# Patient Record
Sex: FEMALE | Race: WHITE | Employment: OTHER | URBAN - METROPOLITAN AREA
[De-identification: names, ages, dates, MRNs, and addresses within clinical notes are randomized per-mention and may not be internally consistent; named-entity substitution may affect disease eponyms.]

---

## 2018-02-20 PROBLEM — E78.5 HYPERLIPIDEMIA LDL GOAL <100: Chronic | Status: ACTIVE | Noted: 2018-02-20

## 2018-02-20 PROBLEM — N30.00 ACUTE CYSTITIS: Status: ACTIVE | Noted: 2018-02-20

## 2018-02-20 PROBLEM — R07.9 CHEST PAIN: Status: ACTIVE | Noted: 2018-02-20

## 2018-03-23 DIAGNOSIS — E78.5 HYPERLIPIDEMIA LDL GOAL <100: Chronic | ICD-10-CM

## 2018-04-02 ENCOUNTER — TELEPHONE (OUTPATIENT)
Dept: PRIMARY CARE CLINIC | Age: 68
End: 2018-04-02

## 2018-05-04 DIAGNOSIS — E78.5 HYPERLIPIDEMIA LDL GOAL <100: Chronic | ICD-10-CM

## 2018-06-01 ENCOUNTER — OFFICE VISIT (OUTPATIENT)
Dept: PRIMARY CARE CLINIC | Age: 68
End: 2018-06-01
Payer: MEDICARE

## 2018-06-01 ENCOUNTER — HOSPITAL ENCOUNTER (OUTPATIENT)
Age: 68
Discharge: HOME | End: 2018-06-03
Payer: MEDICARE

## 2018-06-01 VITALS
HEART RATE: 57 BPM | OXYGEN SATURATION: 98 % | WEIGHT: 178 LBS | BODY MASS INDEX: 29.62 KG/M2 | TEMPERATURE: 98.2 F | SYSTOLIC BLOOD PRESSURE: 110 MMHG | DIASTOLIC BLOOD PRESSURE: 80 MMHG

## 2018-06-01 DIAGNOSIS — E78.5 HYPERLIPIDEMIA LDL GOAL <100: Chronic | ICD-10-CM

## 2018-06-01 DIAGNOSIS — Z12.31 SCREENING MAMMOGRAM, ENCOUNTER FOR: ICD-10-CM

## 2018-06-01 DIAGNOSIS — E78.5 HYPERLIPIDEMIA LDL GOAL <100: Primary | Chronic | ICD-10-CM

## 2018-06-01 LAB
CHOLESTEROL, TOTAL: 185 MG/DL (ref 0–199)
HDLC SERPL-MCNC: 52 MG/DL
LDL CHOLESTEROL CALCULATED: 85 MG/DL (ref 0–99)
TRIGL SERPL-MCNC: 241 MG/DL (ref 0–149)
VLDLC SERPL CALC-MCNC: 48 MG/DL

## 2018-06-01 PROCEDURE — 80061 LIPID PANEL: CPT

## 2018-06-01 PROCEDURE — 99213 OFFICE O/P EST LOW 20 MIN: CPT | Performed by: INTERNAL MEDICINE

## 2018-06-12 ENCOUNTER — HOSPITAL ENCOUNTER (OUTPATIENT)
Dept: GENERAL RADIOLOGY | Age: 68
Discharge: HOME | End: 2018-06-14
Payer: MEDICARE

## 2018-06-12 DIAGNOSIS — Z12.31 SCREENING MAMMOGRAM, ENCOUNTER FOR: ICD-10-CM

## 2018-06-12 PROCEDURE — 77063 BREAST TOMOSYNTHESIS BI: CPT

## 2018-07-19 ENCOUNTER — OFFICE VISIT (OUTPATIENT)
Dept: PRIMARY CARE CLINIC | Age: 68
End: 2018-07-19
Payer: MEDICARE

## 2018-07-19 VITALS
HEIGHT: 65 IN | HEART RATE: 66 BPM | SYSTOLIC BLOOD PRESSURE: 110 MMHG | OXYGEN SATURATION: 96 % | TEMPERATURE: 97.7 F | DIASTOLIC BLOOD PRESSURE: 80 MMHG | WEIGHT: 178 LBS | BODY MASS INDEX: 29.66 KG/M2

## 2018-07-19 DIAGNOSIS — G89.29 CHRONIC LEFT SHOULDER PAIN: Primary | ICD-10-CM

## 2018-07-19 DIAGNOSIS — M25.512 CHRONIC LEFT SHOULDER PAIN: Primary | ICD-10-CM

## 2018-07-19 DIAGNOSIS — M19.90 ARTHRITIS: ICD-10-CM

## 2018-07-19 PROCEDURE — 20605 DRAIN/INJ JOINT/BURSA W/O US: CPT | Performed by: PHYSICIAN ASSISTANT

## 2018-07-19 PROCEDURE — 99213 OFFICE O/P EST LOW 20 MIN: CPT | Performed by: PHYSICIAN ASSISTANT

## 2018-07-19 RX ORDER — METHYLPREDNISOLONE ACETATE 80 MG/ML
80 INJECTION, SUSPENSION INTRA-ARTICULAR; INTRALESIONAL; INTRAMUSCULAR; SOFT TISSUE ONCE
Status: COMPLETED | OUTPATIENT
Start: 2018-07-19 | End: 2018-07-19

## 2018-07-19 RX ORDER — METHYLPREDNISOLONE 4 MG/1
TABLET ORAL
Qty: 1 KIT | Refills: 0 | Status: SHIPPED | OUTPATIENT
Start: 2018-07-19 | End: 2018-07-25

## 2018-07-19 RX ADMIN — METHYLPREDNISOLONE ACETATE 80 MG: 80 INJECTION, SUSPENSION INTRA-ARTICULAR; INTRALESIONAL; INTRAMUSCULAR; SOFT TISSUE at 11:41

## 2018-07-19 NOTE — PROGRESS NOTES
Chief Complaint:  Shoulder Pain (L shoulder pain is exacerbated, injections in the past have been helpful)      History of Present Illness:  Source of history provided by:  patient. Alaina Barber is a 75 y.o. old female presenting today with complaints of exacerbation of her L shoudler, had Hx of bursitis, impingement in the past, had injection with Dr. Teja Celeste in 11/2016 and per pt it helped her tremendously. States hse has bene active with yard work, garedning, and sewing and thinks this flared it up. Also is under care of podiatry for foot, and has arthritis in her wrists and back which has been flaring. Takes ibuprofen which doesn't help much. Asks what she can try. Denies any neck pain or injury, no arm weakness,  HA, N/T, hand weakness, or associated CP. Review of Systems:  Unless otherwise stated in this report or unable to obtain because of the patient's clinical or mental status as evidenced by the medical record, this patients's positive and negative responses for Review of Systems, constitutional, psych, eyes, ENT, cardiovascular, respiratory, gastrointestinal, neurological, genitourinary, musculoskeletal, integument systems and systems related to the presenting problem are either stated in the preceding or were not pertinent or were negative for the symptoms and/or complaints related to the medical problem. Past Medical History:  has a past medical history of Arthritis; Chronic kidney disease; Hydronephrosis of left kidney; Hyperlipidemia; and Sepsis ([facility] [address] Past Surgical History:  has a past surgical history that includes Tubal ligation; Colonoscopy; Ureteroscopy (Left, 09/14/2015); eye surgery (Bilateral); and Hysterectomy. Social History:  reports that she has never smoked. She has never used smokeless tobacco. She reports that she does not drink alcohol or use drugs. Family History: family history is not on file. Allergies: Daypro [oxaprozin]; Demerol hcl [meperidine];  Levaquin

## 2018-07-25 ENCOUNTER — TELEPHONE (OUTPATIENT)
Dept: PRIMARY CARE CLINIC | Age: 68
End: 2018-07-25

## 2018-09-28 DIAGNOSIS — F41.9 ANXIETY: ICD-10-CM

## 2018-09-28 RX ORDER — CITALOPRAM 10 MG/1
10 TABLET ORAL DAILY
Qty: 90 TABLET | Refills: 1 | Status: SHIPPED | OUTPATIENT
Start: 2018-09-28 | End: 2018-10-15 | Stop reason: SDUPTHER

## 2018-10-15 ENCOUNTER — OFFICE VISIT (OUTPATIENT)
Dept: PRIMARY CARE CLINIC | Age: 68
End: 2018-10-15
Payer: MEDICARE

## 2018-10-15 VITALS
OXYGEN SATURATION: 97 % | SYSTOLIC BLOOD PRESSURE: 134 MMHG | DIASTOLIC BLOOD PRESSURE: 84 MMHG | HEART RATE: 72 BPM | TEMPERATURE: 97 F | BODY MASS INDEX: 30.29 KG/M2 | WEIGHT: 182 LBS

## 2018-10-15 DIAGNOSIS — M79.2 NEUROPATHIC PAIN: ICD-10-CM

## 2018-10-15 DIAGNOSIS — R23.2 HOT FLASHES: ICD-10-CM

## 2018-10-15 DIAGNOSIS — M54.2 NECK PAIN: Primary | ICD-10-CM

## 2018-10-15 DIAGNOSIS — F41.9 ANXIETY: ICD-10-CM

## 2018-10-15 PROCEDURE — 99214 OFFICE O/P EST MOD 30 MIN: CPT | Performed by: INTERNAL MEDICINE

## 2018-10-15 RX ORDER — CITALOPRAM 20 MG/1
20 TABLET ORAL DAILY
Qty: 30 TABLET | Refills: 3 | Status: SHIPPED | OUTPATIENT
Start: 2018-10-15 | End: 2019-02-05 | Stop reason: SDUPTHER

## 2018-10-15 ASSESSMENT — PATIENT HEALTH QUESTIONNAIRE - PHQ9
SUM OF ALL RESPONSES TO PHQ QUESTIONS 1-9: 0
1. LITTLE INTEREST OR PLEASURE IN DOING THINGS: 0
SUM OF ALL RESPONSES TO PHQ QUESTIONS 1-9: 0

## 2018-10-15 NOTE — PROGRESS NOTES
and leg swelling. Gastrointestinal: Negative for abdominal distention, constipation, diarrhea, nausea and vomiting. Musculoskeletal: Negative for arthralgias, back pain, gait problem and joint swelling. Neurological: Negative for dizziness, weakness,numbness and headaches. OBJECTIVE:  /84   Pulse 72   Temp 97 °F (36.1 °C)   Wt 182 lb (82.6 kg)   SpO2 97%   BMI 30.29 kg/m²      Physical Exam   Constitutional:  oriented to person, place, and time. appears well-developed and well-nourished. No distress. HENT: No sinustenderness or lymphadenopathy  Head: Normocephalic and atraumatic. Eyes: Left eye exhibits no discharge. No scleral icterus. Neck: No tracheal deviation present. No thyromegalypresent. Cardiovascular: Normal rate, regular rhythm, normal heart sounds and intact distal pulses. Exam reveals no gallop and no friction rub. No murmur heard. Pulmonary/Chest: Effort normal and breath sounds normal. No respiratory distress. She has no wheezes. no rales. no tenderness. Musculoskeletal:  no tenderness. Neurological:alert and oriented to person, place, and time. Skin: No diaphoresis. Psychiatric: Normal mood and affect. Behavior isnormal.     ASSESSMENT AND PLAN:    Santos Sanon was seen today for shoulder pain and arm pain. Diagnoses and all orders for this visit:    Neck pain  -     XR CERVICAL SPINE (2-3 VIEWS); Future  -     citalopram (CELEXA) 20 MG tablet; Take 1 tablet by mouth daily    Anxiety  -     citalopram (CELEXA) 20 MG tablet; Take 1 tablet by mouth daily    Neuropathic pain    Hot flashes        Megan Brian presents today for evaluation of her chronic aches and pains. She is complaining of some upper thoracic, cervical and bilateral arm pain. She states that this is been present for many years but is worse in recently. She questions whether or not she has a diagnosis of fribromyalgia.  I will 1st increase her Celexa to 20 mg. I will also obtain an x-ray of the

## 2018-10-17 ENCOUNTER — HOSPITAL ENCOUNTER (OUTPATIENT)
Dept: GENERAL RADIOLOGY | Age: 68
Discharge: HOME | End: 2018-10-19
Payer: MEDICARE

## 2018-10-17 ENCOUNTER — HOSPITAL ENCOUNTER (OUTPATIENT)
Age: 68
Discharge: HOME | End: 2018-10-19
Payer: MEDICARE

## 2018-10-17 DIAGNOSIS — M54.2 NECK PAIN: ICD-10-CM

## 2018-10-17 PROCEDURE — 72040 X-RAY EXAM NECK SPINE 2-3 VW: CPT

## 2018-12-04 ENCOUNTER — OFFICE VISIT (OUTPATIENT)
Dept: PRIMARY CARE CLINIC | Age: 68
End: 2018-12-04
Payer: MEDICARE

## 2018-12-04 VITALS
BODY MASS INDEX: 29.95 KG/M2 | DIASTOLIC BLOOD PRESSURE: 62 MMHG | OXYGEN SATURATION: 97 % | WEIGHT: 180 LBS | TEMPERATURE: 96.4 F | HEART RATE: 71 BPM | SYSTOLIC BLOOD PRESSURE: 122 MMHG

## 2018-12-04 DIAGNOSIS — G89.29 CHRONIC LEFT SHOULDER PAIN: Primary | ICD-10-CM

## 2018-12-04 DIAGNOSIS — M25.512 CHRONIC LEFT SHOULDER PAIN: Primary | ICD-10-CM

## 2018-12-04 DIAGNOSIS — J01.10 ACUTE FRONTAL SINUSITIS, RECURRENCE NOT SPECIFIED: ICD-10-CM

## 2018-12-04 DIAGNOSIS — M79.645 PAIN OF LEFT THUMB: ICD-10-CM

## 2018-12-04 DIAGNOSIS — M75.42 IMPINGEMENT SYNDROME OF LEFT SHOULDER: ICD-10-CM

## 2018-12-04 PROCEDURE — 99213 OFFICE O/P EST LOW 20 MIN: CPT | Performed by: INTERNAL MEDICINE

## 2018-12-04 RX ORDER — AZITHROMYCIN 250 MG/1
TABLET, FILM COATED ORAL
Qty: 6 TABLET | Refills: 0 | Status: SHIPPED | OUTPATIENT
Start: 2018-12-04 | End: 2018-12-14

## 2018-12-05 ENCOUNTER — HOSPITAL ENCOUNTER (OUTPATIENT)
Age: 68
Discharge: HOME | End: 2018-12-07
Payer: MEDICARE

## 2018-12-05 ENCOUNTER — HOSPITAL ENCOUNTER (OUTPATIENT)
Dept: GENERAL RADIOLOGY | Age: 68
Discharge: HOME | End: 2018-12-07
Payer: MEDICARE

## 2018-12-05 DIAGNOSIS — G89.29 CHRONIC LEFT SHOULDER PAIN: ICD-10-CM

## 2018-12-05 DIAGNOSIS — M79.645 PAIN OF LEFT THUMB: ICD-10-CM

## 2018-12-05 DIAGNOSIS — M25.512 CHRONIC LEFT SHOULDER PAIN: ICD-10-CM

## 2018-12-05 PROCEDURE — 73120 X-RAY EXAM OF HAND: CPT

## 2018-12-05 PROCEDURE — 73030 X-RAY EXAM OF SHOULDER: CPT

## 2019-01-09 ENCOUNTER — OFFICE VISIT (OUTPATIENT)
Dept: ORTHOPEDIC SURGERY | Age: 69
End: 2019-01-09
Payer: MEDICARE

## 2019-01-09 VITALS
BODY MASS INDEX: 29.66 KG/M2 | DIASTOLIC BLOOD PRESSURE: 81 MMHG | HEIGHT: 65 IN | TEMPERATURE: 98.2 F | SYSTOLIC BLOOD PRESSURE: 144 MMHG | WEIGHT: 178 LBS | HEART RATE: 65 BPM

## 2019-01-09 DIAGNOSIS — M25.512 CHRONIC LEFT SHOULDER PAIN: Primary | ICD-10-CM

## 2019-01-09 DIAGNOSIS — G89.29 CHRONIC LEFT SHOULDER PAIN: Primary | ICD-10-CM

## 2019-01-09 PROCEDURE — 99203 OFFICE O/P NEW LOW 30 MIN: CPT | Performed by: ORTHOPAEDIC SURGERY

## 2019-01-09 RX ORDER — IPRATROPIUM BROMIDE 42 UG/1
2 SPRAY, METERED NASAL EVERY 12 HOURS
COMMUNITY
End: 2019-08-28 | Stop reason: ALTCHOICE

## 2019-01-17 ENCOUNTER — HOSPITAL ENCOUNTER (OUTPATIENT)
Dept: MRI IMAGING | Age: 69
Discharge: HOME | End: 2019-01-19
Payer: MEDICARE

## 2019-01-17 DIAGNOSIS — M25.512 CHRONIC LEFT SHOULDER PAIN: ICD-10-CM

## 2019-01-17 DIAGNOSIS — G89.29 CHRONIC LEFT SHOULDER PAIN: ICD-10-CM

## 2019-01-17 PROCEDURE — 73221 MRI JOINT UPR EXTREM W/O DYE: CPT

## 2019-01-25 ENCOUNTER — OFFICE VISIT (OUTPATIENT)
Dept: ORTHOPEDIC SURGERY | Age: 69
End: 2019-01-25
Payer: MEDICARE

## 2019-01-25 VITALS
SYSTOLIC BLOOD PRESSURE: 122 MMHG | HEART RATE: 72 BPM | DIASTOLIC BLOOD PRESSURE: 73 MMHG | BODY MASS INDEX: 29.66 KG/M2 | HEIGHT: 65 IN | TEMPERATURE: 97.4 F | WEIGHT: 178 LBS

## 2019-01-25 DIAGNOSIS — M25.512 CHRONIC LEFT SHOULDER PAIN: Primary | ICD-10-CM

## 2019-01-25 DIAGNOSIS — G89.29 CHRONIC LEFT SHOULDER PAIN: Primary | ICD-10-CM

## 2019-01-25 PROCEDURE — 20610 DRAIN/INJ JOINT/BURSA W/O US: CPT | Performed by: ORTHOPAEDIC SURGERY

## 2019-01-25 PROCEDURE — 99213 OFFICE O/P EST LOW 20 MIN: CPT | Performed by: ORTHOPAEDIC SURGERY

## 2019-01-25 RX ORDER — BUPIVACAINE HYDROCHLORIDE 5 MG/ML
2 INJECTION, SOLUTION PERINEURAL ONCE
Status: COMPLETED | OUTPATIENT
Start: 2019-01-25 | End: 2019-01-25

## 2019-01-25 RX ORDER — TRIAMCINOLONE ACETONIDE 40 MG/ML
40 INJECTION, SUSPENSION INTRA-ARTICULAR; INTRAMUSCULAR ONCE
Status: COMPLETED | OUTPATIENT
Start: 2019-01-25 | End: 2019-01-25

## 2019-01-25 RX ORDER — LIDOCAINE HYDROCHLORIDE 10 MG/ML
2 INJECTION, SOLUTION INFILTRATION; PERINEURAL ONCE
Status: COMPLETED | OUTPATIENT
Start: 2019-01-25 | End: 2019-01-25

## 2019-01-25 RX ADMIN — LIDOCAINE HYDROCHLORIDE 2 ML: 10 INJECTION, SOLUTION INFILTRATION; PERINEURAL at 11:14

## 2019-01-25 RX ADMIN — TRIAMCINOLONE ACETONIDE 40 MG: 40 INJECTION, SUSPENSION INTRA-ARTICULAR; INTRAMUSCULAR at 11:14

## 2019-01-25 RX ADMIN — BUPIVACAINE HYDROCHLORIDE 10 MG: 5 INJECTION, SOLUTION PERINEURAL at 11:13

## 2019-02-05 ENCOUNTER — OFFICE VISIT (OUTPATIENT)
Dept: PRIMARY CARE CLINIC | Age: 69
End: 2019-02-05
Payer: MEDICARE

## 2019-02-05 VITALS
BODY MASS INDEX: 29.62 KG/M2 | HEART RATE: 60 BPM | TEMPERATURE: 96.6 F | SYSTOLIC BLOOD PRESSURE: 122 MMHG | WEIGHT: 178 LBS | DIASTOLIC BLOOD PRESSURE: 62 MMHG | OXYGEN SATURATION: 97 %

## 2019-02-05 DIAGNOSIS — M75.82 TENDINITIS OF LEFT ROTATOR CUFF: ICD-10-CM

## 2019-02-05 DIAGNOSIS — F41.9 ANXIETY: ICD-10-CM

## 2019-02-05 DIAGNOSIS — M54.2 NECK PAIN: ICD-10-CM

## 2019-02-05 DIAGNOSIS — E55.9 VITAMIN D DEFICIENCY: ICD-10-CM

## 2019-02-05 DIAGNOSIS — E78.5 HYPERLIPIDEMIA LDL GOAL <100: Primary | Chronic | ICD-10-CM

## 2019-02-05 PROCEDURE — 99214 OFFICE O/P EST MOD 30 MIN: CPT | Performed by: INTERNAL MEDICINE

## 2019-02-05 RX ORDER — CITALOPRAM 20 MG/1
20 TABLET ORAL DAILY
Qty: 90 TABLET | Refills: 1 | Status: SHIPPED | OUTPATIENT
Start: 2019-02-05 | End: 2019-08-06 | Stop reason: SDUPTHER

## 2019-02-05 ASSESSMENT — PATIENT HEALTH QUESTIONNAIRE - PHQ9
SUM OF ALL RESPONSES TO PHQ9 QUESTIONS 1 & 2: 0
2. FEELING DOWN, DEPRESSED OR HOPELESS: 0
SUM OF ALL RESPONSES TO PHQ QUESTIONS 1-9: 0
1. LITTLE INTEREST OR PLEASURE IN DOING THINGS: 0
SUM OF ALL RESPONSES TO PHQ QUESTIONS 1-9: 0

## 2019-02-05 ASSESSMENT — ENCOUNTER SYMPTOMS
SHORTNESS OF BREATH: 0
CONSTIPATION: 0
DIARRHEA: 0
BACK PAIN: 1
WHEEZING: 0

## 2019-02-08 ENCOUNTER — NURSE ONLY (OUTPATIENT)
Dept: PRIMARY CARE CLINIC | Age: 69
End: 2019-02-08

## 2019-02-08 ENCOUNTER — HOSPITAL ENCOUNTER (OUTPATIENT)
Age: 69
Discharge: HOME | End: 2019-02-10
Payer: MEDICARE

## 2019-02-08 DIAGNOSIS — E78.5 HYPERLIPIDEMIA LDL GOAL <100: Chronic | ICD-10-CM

## 2019-02-08 DIAGNOSIS — E55.9 VITAMIN D DEFICIENCY: ICD-10-CM

## 2019-02-08 LAB
ALBUMIN SERPL-MCNC: 4.1 G/DL (ref 3.5–5.2)
ALP BLD-CCNC: 53 U/L (ref 35–104)
ALT SERPL-CCNC: 15 U/L (ref 0–32)
ANION GAP SERPL CALCULATED.3IONS-SCNC: 9 MMOL/L (ref 7–16)
AST SERPL-CCNC: 16 U/L (ref 0–31)
BILIRUB SERPL-MCNC: 0.5 MG/DL (ref 0–1.2)
BUN BLDV-MCNC: 18 MG/DL (ref 8–23)
CALCIUM SERPL-MCNC: 9 MG/DL (ref 8.6–10.2)
CHLORIDE BLD-SCNC: 103 MMOL/L (ref 98–107)
CHOLESTEROL, TOTAL: 194 MG/DL (ref 0–199)
CO2: 26 MMOL/L (ref 22–29)
CREAT SERPL-MCNC: 0.8 MG/DL (ref 0.5–1)
GFR AFRICAN AMERICAN: >60
GFR NON-AFRICAN AMERICAN: >60 ML/MIN/1.73
GLUCOSE BLD-MCNC: 82 MG/DL (ref 74–99)
HCT VFR BLD CALC: 42.1 % (ref 34–48)
HDLC SERPL-MCNC: 57 MG/DL
HEMOGLOBIN: 13 G/DL (ref 11.5–15.5)
LDL CHOLESTEROL CALCULATED: 103 MG/DL (ref 0–99)
MCH RBC QN AUTO: 29 PG (ref 26–35)
MCHC RBC AUTO-ENTMCNC: 30.9 % (ref 32–34.5)
MCV RBC AUTO: 94 FL (ref 80–99.9)
PDW BLD-RTO: 14.3 FL (ref 11.5–15)
PLATELET # BLD: 257 E9/L (ref 130–450)
PMV BLD AUTO: 11.3 FL (ref 7–12)
POTASSIUM SERPL-SCNC: 4.2 MMOL/L (ref 3.5–5)
RBC # BLD: 4.48 E12/L (ref 3.5–5.5)
SODIUM BLD-SCNC: 138 MMOL/L (ref 132–146)
TOTAL PROTEIN: 7.1 G/DL (ref 6.4–8.3)
TRIGL SERPL-MCNC: 171 MG/DL (ref 0–149)
VITAMIN D 25-HYDROXY: 53 NG/ML (ref 30–100)
VLDLC SERPL CALC-MCNC: 34 MG/DL
WBC # BLD: 7.3 E9/L (ref 4.5–11.5)

## 2019-02-08 PROCEDURE — 85027 COMPLETE CBC AUTOMATED: CPT

## 2019-02-08 PROCEDURE — 80061 LIPID PANEL: CPT

## 2019-02-08 PROCEDURE — 82306 VITAMIN D 25 HYDROXY: CPT

## 2019-02-08 PROCEDURE — 80053 COMPREHEN METABOLIC PANEL: CPT

## 2019-03-08 ENCOUNTER — OFFICE VISIT (OUTPATIENT)
Dept: ORTHOPEDIC SURGERY | Age: 69
End: 2019-03-08
Payer: MEDICARE

## 2019-03-08 VITALS
WEIGHT: 178 LBS | BODY MASS INDEX: 29.66 KG/M2 | SYSTOLIC BLOOD PRESSURE: 117 MMHG | HEART RATE: 58 BPM | DIASTOLIC BLOOD PRESSURE: 76 MMHG | HEIGHT: 65 IN | TEMPERATURE: 97.9 F

## 2019-03-08 DIAGNOSIS — M67.912 TENDINOPATHY OF LEFT ROTATOR CUFF: Primary | ICD-10-CM

## 2019-03-08 DIAGNOSIS — G89.29 CHRONIC LEFT SHOULDER PAIN: ICD-10-CM

## 2019-03-08 DIAGNOSIS — M25.512 CHRONIC LEFT SHOULDER PAIN: ICD-10-CM

## 2019-03-08 DIAGNOSIS — M25.511 RIGHT SHOULDER PAIN, UNSPECIFIED CHRONICITY: ICD-10-CM

## 2019-03-08 PROCEDURE — 99213 OFFICE O/P EST LOW 20 MIN: CPT | Performed by: ORTHOPAEDIC SURGERY

## 2019-03-08 PROCEDURE — 20610 DRAIN/INJ JOINT/BURSA W/O US: CPT | Performed by: ORTHOPAEDIC SURGERY

## 2019-03-08 RX ORDER — TRIAMCINOLONE ACETONIDE 40 MG/ML
40 INJECTION, SUSPENSION INTRA-ARTICULAR; INTRAMUSCULAR ONCE
Status: COMPLETED | OUTPATIENT
Start: 2019-03-08 | End: 2019-03-08

## 2019-03-08 RX ORDER — LIDOCAINE HYDROCHLORIDE 10 MG/ML
4 INJECTION, SOLUTION INFILTRATION; PERINEURAL ONCE
Status: COMPLETED | OUTPATIENT
Start: 2019-03-08 | End: 2019-03-08

## 2019-03-08 RX ADMIN — LIDOCAINE HYDROCHLORIDE 4 ML: 10 INJECTION, SOLUTION INFILTRATION; PERINEURAL at 10:32

## 2019-03-08 RX ADMIN — TRIAMCINOLONE ACETONIDE 40 MG: 40 INJECTION, SUSPENSION INTRA-ARTICULAR; INTRAMUSCULAR at 10:33

## 2019-05-24 ENCOUNTER — OFFICE VISIT (OUTPATIENT)
Dept: ORTHOPEDIC SURGERY | Age: 69
End: 2019-05-24
Payer: MEDICARE

## 2019-05-24 VITALS
BODY MASS INDEX: 29.99 KG/M2 | HEART RATE: 70 BPM | SYSTOLIC BLOOD PRESSURE: 121 MMHG | WEIGHT: 180 LBS | HEIGHT: 65 IN | DIASTOLIC BLOOD PRESSURE: 75 MMHG

## 2019-05-24 DIAGNOSIS — M25.511 ACUTE PAIN OF RIGHT SHOULDER: Primary | ICD-10-CM

## 2019-05-24 DIAGNOSIS — G89.29 CHRONIC LEFT SHOULDER PAIN: ICD-10-CM

## 2019-05-24 DIAGNOSIS — M25.512 CHRONIC LEFT SHOULDER PAIN: ICD-10-CM

## 2019-05-24 DIAGNOSIS — M67.912 TENDINOPATHY OF LEFT ROTATOR CUFF: ICD-10-CM

## 2019-05-24 PROCEDURE — 99213 OFFICE O/P EST LOW 20 MIN: CPT | Performed by: ORTHOPAEDIC SURGERY

## 2019-06-04 ENCOUNTER — TELEPHONE (OUTPATIENT)
Dept: ORTHOPEDIC SURGERY | Age: 69
End: 2019-06-04

## 2019-06-04 DIAGNOSIS — M25.511 RIGHT SHOULDER PAIN, UNSPECIFIED CHRONICITY: Primary | ICD-10-CM

## 2019-06-04 NOTE — TELEPHONE ENCOUNTER
Ish Brunson from IR left vm stating that pt needs BUN/Creat prior to MRI/arthrogram. Order placed and routed for signature.

## 2019-06-06 ENCOUNTER — HOSPITAL ENCOUNTER (OUTPATIENT)
Dept: MRI IMAGING | Age: 69
Discharge: HOME | End: 2019-06-08
Payer: MEDICARE

## 2019-06-06 ENCOUNTER — HOSPITAL ENCOUNTER (OUTPATIENT)
Dept: GENERAL RADIOLOGY | Age: 69
Discharge: HOME | End: 2019-06-08
Payer: MEDICARE

## 2019-06-06 VITALS
RESPIRATION RATE: 18 BRPM | HEIGHT: 65 IN | OXYGEN SATURATION: 95 % | DIASTOLIC BLOOD PRESSURE: 85 MMHG | WEIGHT: 180 LBS | HEART RATE: 62 BPM | BODY MASS INDEX: 29.99 KG/M2 | SYSTOLIC BLOOD PRESSURE: 148 MMHG

## 2019-06-06 DIAGNOSIS — M25.511 ACUTE PAIN OF RIGHT SHOULDER: ICD-10-CM

## 2019-06-06 PROCEDURE — 23350 INJECTION FOR SHOULDER X-RAY: CPT

## 2019-06-06 PROCEDURE — A9579 GAD-BASE MR CONTRAST NOS,1ML: HCPCS | Performed by: NURSE PRACTITIONER

## 2019-06-06 PROCEDURE — 73040 CONTRAST X-RAY OF SHOULDER: CPT

## 2019-06-06 PROCEDURE — 6360000004 HC RX CONTRAST MEDICATION: Performed by: NURSE PRACTITIONER

## 2019-06-06 PROCEDURE — 2500000003 HC RX 250 WO HCPCS: Performed by: NURSE PRACTITIONER

## 2019-06-06 PROCEDURE — 6360000004 HC RX CONTRAST MEDICATION: Performed by: RADIOLOGY

## 2019-06-06 PROCEDURE — 73222 MRI JOINT UPR EXTREM W/DYE: CPT

## 2019-06-06 RX ORDER — LIDOCAINE HYDROCHLORIDE 10 MG/ML
10 INJECTION, SOLUTION INFILTRATION; PERINEURAL ONCE
Status: COMPLETED | OUTPATIENT
Start: 2019-06-06 | End: 2019-06-06

## 2019-06-06 RX ADMIN — IOPAMIDOL 10 ML: 612 INJECTION, SOLUTION INTRAVENOUS at 08:25

## 2019-06-06 RX ADMIN — GADOTERIDOL 1 ML: 279.3 INJECTION, SOLUTION INTRAVENOUS at 09:13

## 2019-06-06 RX ADMIN — LIDOCAINE HYDROCHLORIDE 4 ML: 10 INJECTION, SOLUTION INFILTRATION; PERINEURAL at 08:15

## 2019-06-06 ASSESSMENT — PAIN DESCRIPTION - DESCRIPTORS: DESCRIPTORS: ACHING;SHARP;SHOOTING;RADIATING

## 2019-06-06 ASSESSMENT — PAIN DESCRIPTION - ONSET: ONSET: ON-GOING

## 2019-06-06 ASSESSMENT — PAIN DESCRIPTION - ORIENTATION: ORIENTATION: RIGHT

## 2019-06-06 ASSESSMENT — PAIN DESCRIPTION - LOCATION: LOCATION: SHOULDER

## 2019-06-06 ASSESSMENT — PAIN SCALES - GENERAL: PAINLEVEL_OUTOF10: 8

## 2019-06-06 ASSESSMENT — PAIN DESCRIPTION - PROGRESSION: CLINICAL_PROGRESSION: RAPIDLY WORSENING

## 2019-06-06 ASSESSMENT — PAIN DESCRIPTION - FREQUENCY: FREQUENCY: CONTINUOUS

## 2019-06-06 NOTE — PROGRESS NOTES
IRFLOWSHEET    Date: 6/6/2019    Time: 8:00 AM     Exam: Image Guided Right Shoulder Arthrogram With Magnetic Resonance Imaging    Radiologist Performing Procedure:Charles/SACHA Fairbanks    Nurse Reporting/Phone: [phone]     Nurse Receiving: Celena Coyle signed:      Yes    ID Band Checklist: Yes    Allergies: Daypro [oxaprozin]; Demerol hcl [meperidine]; Levaquin [levofloxacin in d5w]; Tape [adhesive tape]; and Valium [diazepam]     TIME OUT: 9513    PROCEDURE START TIME: 0800    Puncture Site: right shoulder    Puncture Time: 0815    Catheters: 20g    LABS: No results found for: INR, PROTIME        Lab Results   Component Value Date    CREATININE 0.8 02/08/2019    BUN 18 02/08/2019          Lab Results   Component Value Date    HGB 13.0 02/08/2019    HCT 42.1 02/08/2019     02/08/2019         PROCEDURE END TIME: 0820    Total Contrast: 2cc Isovue 300 for location/10cc of mix with 0.1 Prohance,10ccnss [id number]    Fluoroscopy Time: 0.6    Complications:  None    Comments: Procedure explained to pt by Dee Fairbanks and consent confirmed. Placed supine on Fluoroscopy table,marked and sterile prep. Dye injection and then to Mri for images.

## 2019-06-06 NOTE — H&P
Interventional Radiology  Attending Pre-operative History and Physical    DIAGNOSIS:    Patient Active Problem List   Diagnosis    Chest pain    Hyperlipidemia LDL goal <100    Acute cystitis    Chronic left shoulder pain       CHIEF COMPLAINT: Chronic right shoulder pain, no hx of shoulder surgery.          Current Outpatient Medications:     citalopram (CELEXA) 20 MG tablet, Take 1 tablet by mouth daily, Disp: 90 tablet, Rfl: 1    sodium chloride (OCEAN, BABY AYR) 0.65 % nasal spray, 1 spray by Nasal route as needed for Congestion, Disp: , Rfl:     ipratropium (ATROVENT) 0.06 % nasal spray, 2 sprays by Nasal route every 12 hours, Disp: , Rfl:     aspirin 81 MG tablet, Take 81 mg by mouth daily, Disp: , Rfl:     ibuprofen (ADVIL;MOTRIN) 400 MG tablet, Take 1 tablet by mouth daily as needed for Pain, Disp: 120 tablet, Rfl: 3    Cholecalciferol (VITAMIN D3) 2000 UNITS CAPS, Take 2,000 Units by mouth 2 times daily, Disp: , Rfl:     chlorpheniramine (EQ CHLORTABS) 4 MG tablet, Take 4 mg by mouth every 6 hours as needed for Allergies, Disp: , Rfl:     calcium carbonate 600 MG TABS tablet, Take 1 tablet by mouth daily, Disp: , Rfl:     omeprazole (PRILOSEC) 20 MG capsule, Take 20 mg by mouth every morning , Disp: , Rfl:     Omega-3 Fatty Acids (FISH OIL) 1000 MG CAPS, Take 2,000 mg by mouth 2 times daily , Disp: , Rfl:     Multiple Vitamins-Minerals (MULTIVITAMIN & MINERAL PO), Take 1 tablet by mouth daily, Disp: , Rfl:     Current Facility-Administered Medications:     lidocaine 1 % injection 10 mL, 10 mL, Intradermal, Once, Riaz Hermosillo, APRN - CNP    Allergies   Allergen Reactions    Daypro [Oxaprozin]     Demerol Hcl [Meperidine]     Levaquin [Levofloxacin In D5w]     Tape [place] Ditto Tape] Itching     Ok to use paper tape per pt    Valium [Diazepam]        Past Medical History:   Diagnosis Date    Arthritis     Chronic kidney disease     Hydronephrosis left kidney    Hydronephrosis of left kidney 8/30/2015    Hyperlipidemia     Sepsis [facility])        Past Surgical History:   Procedure Laterality Date    COLONOSCOPY      EYE SURGERY Bilateral     Cataracts    HYSTERECTOMY      TUBAL LIGATION      URETEROSCOPY Left 09/14/2015       No family history on file. Social History     Socioeconomic History    Marital status:      Spouse name: Not on file    Number of children: Not on file    Years of education: Not on file    Highest education level: Not on file   Occupational History     Employer: NONE   Social Needs    Financial resource strain: Not on file    Food insecurity:     Worry: Not on file     Inability: Not on file    Transportation needs:     Medical: Not on file     Non-medical: Not on file   Tobacco Use    Smoking status: Never Smoker    Smokeless tobacco: Never Used   Substance and Sexual Activity    Alcohol use: No    Drug use: No    Sexual activity: Yes     Partners: Male   Lifestyle    Physical activity:     Days per week: Not on file     Minutes per session: Not on file    Stress: Not on file   Relationships    Social connections:     Talks on phone: Not on file     Gets together: Not on file     Attends Sikh service: Not on file     Active member of club or organization: Not on file     Attends meetings of clubs or organizations: Not on file     Relationship status: Not on file    Intimate partner violence:     Fear of current or ex partner: Not on file     Emotionally abused: Not on file     Physically abused: Not on file     Forced sexual activity: Not on file   Other Topics Concern    Not on file   Social History Narrative    Not on file       ROS: Non-contributory other than as noted above    PHYSICAL EXAM:      Heent: Alert and orientated. Heart:  Rapid regular rhythm    Lungs: Clear to auscultation in all lung fields.      Abdomen:  Normal          DATA:  CBC:   Lab Results   Component Value Date    WBC 7.3 02/08/2019    RBC 4.48 02/08/2019    HGB 13.0 02/08/2019    HCT 42.1 02/08/2019    MCV 94.0 02/08/2019    MCH 29.0 02/08/2019    MCHC 30.9 02/08/2019    RDW 14.3 02/08/2019     02/08/2019    MPV 11.3 02/08/2019     CBC with Differential:    Lab Results   Component Value Date    WBC 7.3 02/08/2019    RBC 4.48 02/08/2019    HGB 13.0 02/08/2019    HCT 42.1 02/08/2019     02/08/2019    MCV 94.0 02/08/2019    MCH 29.0 02/08/2019    MCHC 30.9 02/08/2019    RDW 14.3 02/08/2019    SEGSPCT 40 12/10/2013    BANDSPCT 18 08/29/2015    LYMPHOPCT 43.0 02/20/2018    MONOPCT 8.9 02/20/2018    BASOPCT 1.0 02/20/2018    MONOSABS 0.68 02/20/2018    LYMPHSABS 3.29 02/20/2018    EOSABS 0.19 02/20/2018    BASOSABS 0.08 02/20/2018     Platelets:    Lab Results   Component Value Date     02/08/2019     BUN/Creatinine:    Lab Results   Component Value Date    BUN 18 02/08/2019    CREATININE 0.8 02/08/2019       ASSESSMENT AND PLAN:  1. Fluoroscopic guided right shoulder arthrogram with MRI imaging. 2.  Procedure options, risks and benefits reviewed with patient. Patient expresses understanding.     Electronically signed by PORSHA Benedict CNP,  on 6/6/19 at 8:09 AM

## 2019-06-06 NOTE — BRIEF OP NOTE
ARTHROGRAM BRIEF POST-OP NOTE        CONSENT  :  INFORMED CONSENT FOR RIGHT SHOULDER ARTHROGRAM WAS OBTAINED, RISK AND BENEFITS WERE DISCUSSED WITH THE PATIENT. ANESTHESIA :  Local    PERFORMED BY: FANNY Lerma under the indirect supervision of by Jennifer Nixon M.D. PROCEDURE :  The skin is prepared by applying betadine and steralized drapes Under fluoroscopy guidance, 20g spinal needle was introduced into the right shoulder joint space. 10 cc of a mixture of Prohance, Isovue 300 and saline was injected into the joint. Images performed in AP, internal and external rotation. Patient is transferred to MRI suite for further imaging. COMPLICATION : None; patient tolerated the procedure well.     Electronically signed by PORSHA Mcmahan CNP  6/6/19 8:09 AM

## 2019-06-12 ENCOUNTER — OFFICE VISIT (OUTPATIENT)
Dept: ORTHOPEDIC SURGERY | Age: 69
End: 2019-06-12
Payer: MEDICARE

## 2019-06-12 VITALS
SYSTOLIC BLOOD PRESSURE: 116 MMHG | BODY MASS INDEX: 29.99 KG/M2 | HEIGHT: 65 IN | DIASTOLIC BLOOD PRESSURE: 74 MMHG | HEART RATE: 62 BPM | WEIGHT: 180 LBS

## 2019-06-12 DIAGNOSIS — G89.29 CHRONIC LEFT SHOULDER PAIN: ICD-10-CM

## 2019-06-12 DIAGNOSIS — M67.912 TENDINOPATHY OF LEFT ROTATOR CUFF: ICD-10-CM

## 2019-06-12 DIAGNOSIS — M25.512 CHRONIC LEFT SHOULDER PAIN: ICD-10-CM

## 2019-06-12 DIAGNOSIS — M25.511 RIGHT SHOULDER PAIN, UNSPECIFIED CHRONICITY: Primary | ICD-10-CM

## 2019-06-12 DIAGNOSIS — M77.8 SUBSCAPULARIS TENDINITIS, RIGHT: ICD-10-CM

## 2019-06-12 PROCEDURE — 99213 OFFICE O/P EST LOW 20 MIN: CPT | Performed by: ORTHOPAEDIC SURGERY

## 2019-06-19 ENCOUNTER — OFFICE VISIT (OUTPATIENT)
Dept: FAMILY MEDICINE CLINIC | Age: 69
End: 2019-06-19
Payer: MEDICARE

## 2019-06-19 VITALS
TEMPERATURE: 99.1 F | HEART RATE: 62 BPM | WEIGHT: 181 LBS | BODY MASS INDEX: 30.16 KG/M2 | SYSTOLIC BLOOD PRESSURE: 112 MMHG | HEIGHT: 65 IN | DIASTOLIC BLOOD PRESSURE: 68 MMHG | OXYGEN SATURATION: 99 %

## 2019-06-19 DIAGNOSIS — R09.81 NASAL CONGESTION: Primary | ICD-10-CM

## 2019-06-19 DIAGNOSIS — H92.02 LEFT EAR PAIN: ICD-10-CM

## 2019-06-19 DIAGNOSIS — R42 DIZZINESS: ICD-10-CM

## 2019-06-19 PROCEDURE — 99213 OFFICE O/P EST LOW 20 MIN: CPT | Performed by: PHYSICIAN ASSISTANT

## 2019-06-19 RX ORDER — METHYLPREDNISOLONE 4 MG/1
TABLET ORAL
Qty: 1 KIT | Refills: 0 | Status: SHIPPED | OUTPATIENT
Start: 2019-06-19 | End: 2019-08-06

## 2019-06-19 RX ORDER — AMOXICILLIN 875 MG/1
875 TABLET, COATED ORAL 2 TIMES DAILY
Qty: 20 TABLET | Refills: 0 | Status: SHIPPED | OUTPATIENT
Start: 2019-06-19 | End: 2019-06-29

## 2019-06-19 NOTE — PROGRESS NOTES
19  Pankaj Walker : 1950 Sex: female  Age 70 y.o. Subjective:  Chief Complaint   Patient presents with    Dizziness     has been going on a little over a week     Head Congestion     a lot of pressure          HPI:   Pankaj Walker , 70 y.o. female presents to express care for evaluation of head congestion,, dizziness. The patient has had the symptoms that have been ongoing for greater than a week. The patient notes some pressure in her left ear as well as some pressure in her frontal sinuses. The patient is not having any chest pain, shortness of breath, abdominal pain. The patient has had some increased nasal congestion rhinorrhea that is been ongoing this spring. The patient denies any traumatic injury to the head. The patient is not having any dizziness at this time. Seems to be intermittent. Seems to be worse when lying down. The patient is not having any fevers or chills. The patient has not been seen or evaluated for this in the last week that the symptoms are ongoing. The patient is able to drive him maintain her daily activities. ROS:   Unless otherwise stated in this report the patient's positive and negative responses for review of systems for constitutional, eyes, ENT, cardiovascular, respiratory, gastrointestinal, neurological, , musculoskeletal, and integument systems and related systems to the presenting problem are either stated in the history of present illness or were not pertinent or were negative for the symptoms and/or complaints related to the presenting medical problem. Positives and pertinent negatives as per HPI. All others reviewed and are negative.       PMH:     Past Medical History:   Diagnosis Date    Arthritis     Chronic kidney disease     Hydronephrosis left kidney    Hydronephrosis of left kidney 2015    Hyperlipidemia     Sepsis [facility])        Past Surgical History:   Procedure Laterality Date    COLONOSCOPY      EYE SURGERY Bilateral 99.1 °F (37.3 °C)   TempSrc: Oral   SpO2: 99%   Weight: 181 lb (82.1 kg)   Height: 5' 5\" (1.651 m)       Exam:  Physical Exam  Nurse's notes and vital signs reviewed. The patient is not hypoxic. General: Alert, no acute distress, patient resting comfortably Patient is not toxic or lethargic. Skin: Warm, intact, no pallor noted. There is no evidence of rash at this time. Head: Normocephalic, atraumatic. Eye: Normal conjunctiva, PERRLA, EOMI, no nystagmus. Ears, Nose, Throat: Right tympanic membrane clear, left tympanic membrane clear. No drainage or discharge noted. No pre- or post-auricular tenderness, erythema, or swelling noted. Moderate nasal congestion but no rhinorrhea. No facial erythema. Posterior oropharynx shows erythema, but no hypertrophy, asymmetry or peritonsillar abscess and no evidence of exudate. the uvula is midline. No trismus or drooling is noted. Moist mucous membranes. Neck: No anterior/posterior lymphadenopathy noted. No erythema, no masses, no fluctuance or induration noted. No meningeal signs. Cardio: Regular Rate and Rhythm  Respiratory: No acute distress, no rhonchi, wheezing or crackles noted. No stridor or retractions are noted. Neurological: A&O x4, normal speech normal equal  strength, normal finger-to-nose, normal motor, normal sensory, no ataxia, no gait disturbance, patient walked without deficit. Psychiatric: Cooperative         Testing:           Medical Decision Making:     Patient had a relatively benign physical exam.  The patient had vital signs reviewed and noted to be within normal limits. She does have a low-grade temperature. The patient does not appear to be in any apparent distress or discomfort. The patient does have some increased nasal congestion rhinorrhea. The patient has been lightheaded and dizzy. The patient had differential diagnosis discussed with her including including stroke.   Due to limitations of [facility] care I do not have CT readily available. We would have to send the patient for evaluation in the emergency department. The patient is refusing at this point. The patient would like to try medication to see if it does not help with her sinuses. The patient is to push fluids over the next several days. Close follow-up with PCP in the next 1-2 days. Or go directly to the emergency department for any signs or symptoms worsen. Clinical Impression:   Fernandez Brown was seen today for dizziness and head congestion. Diagnoses and all orders for this visit:    Nasal congestion    Left ear pain    Dizziness    Other orders  -     methylPREDNISolone (MEDROL DOSEPACK) 4 MG tablet; Take by mouth. -     amoxicillin (AMOXIL) 875 MG tablet; Take 1 tablet by mouth 2 times daily for 10 days        The patient is to call for any concerns or return if any of the signs or symptoms worsen. The patient is to follow-up with PCP in the next 2-3 days for repeat evaluation repeat assessment or go directly to the emergency department.      SIGNATURE: Liliam Hart III, PA-C

## 2019-07-11 ENCOUNTER — TELEPHONE (OUTPATIENT)
Dept: PRIMARY CARE CLINIC | Age: 69
End: 2019-07-11

## 2019-07-11 DIAGNOSIS — Z12.39 BREAST CANCER SCREENING: Primary | ICD-10-CM

## 2019-07-11 NOTE — TELEPHONE ENCOUNTER
Patient needs script for yearly screening mammogram. She will be going to [place] and can get in Monday.

## 2019-07-15 ENCOUNTER — HOSPITAL ENCOUNTER (OUTPATIENT)
Dept: GENERAL RADIOLOGY | Age: 69
Discharge: HOME | End: 2019-07-17
Payer: MEDICARE

## 2019-07-15 DIAGNOSIS — Z12.39 BREAST CANCER SCREENING: ICD-10-CM

## 2019-07-15 PROCEDURE — 77063 BREAST TOMOSYNTHESIS BI: CPT

## 2019-08-06 ENCOUNTER — HOSPITAL ENCOUNTER (OUTPATIENT)
Age: 69
Discharge: HOME | End: 2019-08-08
Payer: MEDICARE

## 2019-08-06 ENCOUNTER — OFFICE VISIT (OUTPATIENT)
Dept: PRIMARY CARE CLINIC | Age: 69
End: 2019-08-06
Payer: MEDICARE

## 2019-08-06 VITALS
TEMPERATURE: 97.6 F | BODY MASS INDEX: 30.29 KG/M2 | HEART RATE: 72 BPM | SYSTOLIC BLOOD PRESSURE: 122 MMHG | DIASTOLIC BLOOD PRESSURE: 62 MMHG | OXYGEN SATURATION: 97 % | WEIGHT: 182 LBS

## 2019-08-06 DIAGNOSIS — M54.2 NECK PAIN: ICD-10-CM

## 2019-08-06 DIAGNOSIS — M19.049 HAND ARTHRITIS: Primary | ICD-10-CM

## 2019-08-06 DIAGNOSIS — M19.049 HAND ARTHRITIS: ICD-10-CM

## 2019-08-06 DIAGNOSIS — Z01.810 PREOP CARDIOVASCULAR EXAM: ICD-10-CM

## 2019-08-06 DIAGNOSIS — E78.00 HYPERCHOLESTEROLEMIA: ICD-10-CM

## 2019-08-06 DIAGNOSIS — F41.9 ANXIETY: ICD-10-CM

## 2019-08-06 LAB
ALBUMIN SERPL-MCNC: 4.1 G/DL (ref 3.5–5.2)
ALP BLD-CCNC: 51 U/L (ref 35–104)
ALT SERPL-CCNC: 23 U/L (ref 0–32)
ANION GAP SERPL CALCULATED.3IONS-SCNC: 12 MMOL/L (ref 7–16)
AST SERPL-CCNC: 22 U/L (ref 0–31)
BILIRUB SERPL-MCNC: 0.4 MG/DL (ref 0–1.2)
BUN BLDV-MCNC: 14 MG/DL (ref 8–23)
C-REACTIVE PROTEIN: 0.2 MG/DL (ref 0–0.4)
CALCIUM SERPL-MCNC: 9.6 MG/DL (ref 8.6–10.2)
CHLORIDE BLD-SCNC: 103 MMOL/L (ref 98–107)
CHOLESTEROL, TOTAL: 309 MG/DL (ref 0–199)
CO2: 24 MMOL/L (ref 22–29)
CREAT SERPL-MCNC: 0.7 MG/DL (ref 0.5–1)
GFR AFRICAN AMERICAN: >60
GFR NON-AFRICAN AMERICAN: >60 ML/MIN/1.73
GLUCOSE BLD-MCNC: 90 MG/DL (ref 74–99)
HCT VFR BLD CALC: 40.4 % (ref 34–48)
HDLC SERPL-MCNC: 38 MG/DL
HEMOGLOBIN: 12.8 G/DL (ref 11.5–15.5)
LDL CHOLESTEROL CALCULATED: ABNORMAL MG/DL (ref 0–99)
MCH RBC QN AUTO: 29.2 PG (ref 26–35)
MCHC RBC AUTO-ENTMCNC: 31.7 % (ref 32–34.5)
MCV RBC AUTO: 92 FL (ref 80–99.9)
PDW BLD-RTO: 13.9 FL (ref 11.5–15)
PLATELET # BLD: 204 E9/L (ref 130–450)
PMV BLD AUTO: 11.9 FL (ref 7–12)
POTASSIUM SERPL-SCNC: 4.4 MMOL/L (ref 3.5–5)
RBC # BLD: 4.39 E12/L (ref 3.5–5.5)
RHEUMATOID FACTOR: <10 IU/ML (ref 0–13)
SEDIMENTATION RATE, ERYTHROCYTE: 13 MM/HR (ref 0–20)
SODIUM BLD-SCNC: 139 MMOL/L (ref 132–146)
TOTAL PROTEIN: 7.3 G/DL (ref 6.4–8.3)
TRIGL SERPL-MCNC: 459 MG/DL (ref 0–149)
VLDLC SERPL CALC-MCNC: ABNORMAL MG/DL
WBC # BLD: 6 E9/L (ref 4.5–11.5)

## 2019-08-06 PROCEDURE — 86039 ANTINUCLEAR ANTIBODIES (ANA): CPT

## 2019-08-06 PROCEDURE — 93000 ELECTROCARDIOGRAM COMPLETE: CPT | Performed by: INTERNAL MEDICINE

## 2019-08-06 PROCEDURE — 86038 ANTINUCLEAR ANTIBODIES: CPT

## 2019-08-06 PROCEDURE — 86140 C-REACTIVE PROTEIN: CPT

## 2019-08-06 PROCEDURE — 80061 LIPID PANEL: CPT

## 2019-08-06 PROCEDURE — 85651 RBC SED RATE NONAUTOMATED: CPT

## 2019-08-06 PROCEDURE — 80053 COMPREHEN METABOLIC PANEL: CPT

## 2019-08-06 PROCEDURE — 99214 OFFICE O/P EST MOD 30 MIN: CPT | Performed by: INTERNAL MEDICINE

## 2019-08-06 PROCEDURE — 86200 CCP ANTIBODY: CPT

## 2019-08-06 PROCEDURE — 86431 RHEUMATOID FACTOR QUANT: CPT

## 2019-08-06 PROCEDURE — 85027 COMPLETE CBC AUTOMATED: CPT

## 2019-08-06 RX ORDER — CITALOPRAM 20 MG/1
20 TABLET ORAL DAILY
Qty: 90 TABLET | Refills: 1 | Status: SHIPPED
Start: 2019-08-06 | End: 2020-02-19 | Stop reason: SDUPTHER

## 2019-08-07 ENCOUNTER — TELEPHONE (OUTPATIENT)
Dept: PRIMARY CARE CLINIC | Age: 69
End: 2019-08-07

## 2019-08-07 DIAGNOSIS — E78.00 HYPERCHOLESTEROLEMIA: Primary | ICD-10-CM

## 2019-08-07 LAB
ANA PATTERN: ABNORMAL
ANA TITER: ABNORMAL
ANTI-NUCLEAR ANTIBODY (ANA): POSITIVE

## 2019-08-07 RX ORDER — EZETIMIBE 10 MG/1
10 TABLET ORAL DAILY
Qty: 30 TABLET | Refills: 3 | Status: SHIPPED | OUTPATIENT
Start: 2019-08-07 | End: 2019-09-12 | Stop reason: SDUPTHER

## 2019-08-08 LAB — CCP IGG ANTIBODIES: 4 UNITS (ref 0–19)

## 2019-08-19 ENCOUNTER — TELEPHONE (OUTPATIENT)
Dept: ORTHOPEDIC SURGERY | Age: 69
End: 2019-08-19

## 2019-08-28 ENCOUNTER — OFFICE VISIT (OUTPATIENT)
Dept: ORTHOPEDIC SURGERY | Age: 69
End: 2019-08-28
Payer: MEDICARE

## 2019-08-28 VITALS
DIASTOLIC BLOOD PRESSURE: 74 MMHG | BODY MASS INDEX: 28.25 KG/M2 | HEIGHT: 67 IN | WEIGHT: 180 LBS | HEART RATE: 76 BPM | SYSTOLIC BLOOD PRESSURE: 139 MMHG

## 2019-08-28 DIAGNOSIS — M77.8 SUBSCAPULARIS TENDINITIS, RIGHT: ICD-10-CM

## 2019-08-28 DIAGNOSIS — M25.511 RIGHT SHOULDER PAIN, UNSPECIFIED CHRONICITY: ICD-10-CM

## 2019-08-28 DIAGNOSIS — Z01.818 PRE-OP EXAM: Primary | ICD-10-CM

## 2019-08-28 DIAGNOSIS — M75.101 TEAR OF RIGHT ROTATOR CUFF, UNSPECIFIED TEAR EXTENT, UNSPECIFIED WHETHER TRAUMATIC: ICD-10-CM

## 2019-08-28 PROCEDURE — 99213 OFFICE O/P EST LOW 20 MIN: CPT | Performed by: ORTHOPAEDIC SURGERY

## 2019-08-28 RX ORDER — HYDROCODONE BITARTRATE AND ACETAMINOPHEN 5; 325 MG/1; MG/1
1 TABLET ORAL EVERY 6 HOURS PRN
Qty: 20 TABLET | Refills: 0 | Status: SHIPPED | OUTPATIENT
Start: 2019-08-28 | End: 2019-08-28 | Stop reason: ALTCHOICE

## 2019-08-28 RX ORDER — KETOROLAC TROMETHAMINE 10 MG/1
10 TABLET, FILM COATED ORAL EVERY 6 HOURS PRN
Qty: 8 TABLET | Refills: 0 | Status: SHIPPED | OUTPATIENT
Start: 2019-08-28 | End: 2019-08-28 | Stop reason: ALTCHOICE

## 2019-08-28 NOTE — PROGRESS NOTES
Department of Orthopedic Surgery  Attending Pre-operative History and Physical        DIAGNOSIS:  Right shoulder rotator cuff tear     INDICATION:  Failed conservative management     PROCEDURE:  RIGHT SHOULDER ARTHROSCOPY ROTATOR CUFF REPAIR POSSIBLE BICEPS TENOTOMY VS TENODESIS     CHIEF COMPLAINT:  Right shoulder pain    History Obtained From:  patient    HISTORY OF PRESENT ILLNESS:      The patient is a 70 y.o. female with significant past medical history of right shoulder pain which has been present for many months. She has failed extensive conservative treatment. She has MRI showing appears to be full-thickness tear without retraction at the very anterior portion of the supraspinatus. She has had progressive pain as well as decreasing function. For these reasons she is interested in surgical management. We discussed this would involve right shoulder arthroscopy, rotator cuff repair versus debridement versus Regeneten patch augmentation, possible biceps tenotomy versus tenodesis. We discussed risks in detail including but not limited to infection, neurovascular injury, postoperative stiffness, re-tear, persistent pain, unforeseen risks. She understands and would like to proceed    Past Medical History:        Diagnosis Date    Arthritis     Chronic kidney disease     Hydronephrosis left kidney    Hydronephrosis of left kidney 8/30/2015    Hyperlipidemia     Sepsis ([address]        Past Surgical History:        Procedure Laterality Date    COLONOSCOPY      EYE SURGERY Bilateral     Cataracts    HYSTERECTOMY      TUBAL LIGATION      URETEROSCOPY Left 09/14/2015       Medications Prior to Admission:   Not in a hospital admission. Allergies:  Daypro [oxaprozin]; Demerol hcl [meperidine]; Levaquin [levofloxacin in d5w]; Tape [adhesive tape]; and Valium [diazepam]    History of allergic reaction to anesthesia:  No    Social History:   TOBACCO:   reports that she has never smoked.  She has never used smokeless tobacco.  ETOH:   reports that she does not drink alcohol. DRUGS:   reports that she does not use drugs. Family History:   History reviewed. No pertinent family history. REVIEW OF SYSTEMS:  CONSTITUTIONAL:  negative  RESPIRATORY:  negative  CARDIOVASCULAR:  negative  MUSCULOSKELETAL:  negative except for  HPI  NEUROLOGICAL:  negative    PHYSICAL EXAM:     VITALS:  There were no vitals taken for this visit. Gen: AOx3, NAD    Heart:  normal apical pulses, normal S1 and S2 and no edema    Lungs:  No increased work of breathing, good air exchange     Abdomen:  no scars, non-distended and non-tender    Extremities:  No clubbing, cyanosis, or edema    Musculoskeletal: On exam of the right shoulder, range of motion is 160/30/T10. There is mild pain reaching behind the back. Marlon test reveals mild pain but no weakness. Speed and [place] tests are both positive for pain in the shoulder. There is tenderness over the biceps tendon. Belly press test is intact but with mild limits in endpoint range of motion. There is no tenderness over the acromioclavicular joint.   Resisted external rotation is intact      DATA:  CBC:   Lab Results   Component Value Date    WBC 6.0 08/06/2019    RBC 4.39 08/06/2019    HGB 12.8 08/06/2019    HCT 40.4 08/06/2019    MCV 92.0 08/06/2019    MCH 29.2 08/06/2019    MCHC 31.7 08/06/2019    RDW 13.9 08/06/2019     08/06/2019    MPV 11.9 08/06/2019     BMP:    Lab Results   Component Value Date     08/06/2019    K 4.4 08/06/2019     08/06/2019    CO2 24 08/06/2019    BUN 14 08/06/2019    LABALBU 4.1 08/06/2019    LABALBU 4.5 05/29/2012    CREATININE 0.7 08/06/2019    CALCIUM 9.6 08/06/2019    GFRAA >60 08/06/2019    LABGLOM >60 08/06/2019    GLUCOSE 90 08/06/2019    GLUCOSE 83 05/29/2012       Radiology Review: MRI was reviewed showing high-grade partial versus small full-thickness tear without retraction involving the anterior portion of the

## 2019-08-28 NOTE — PROGRESS NOTES
[facility] PRE-ADMISSION TESTING INSTRUCTIONS    The Preadmission Testing patient is instructed accordingly using the following criteria (check applicable):    ARRIVAL INSTRUCTIONS:  [x] Parking the day of Surgery is located in the Main Entrance lot. Upon entering the door, make an immediate right to the surgery reception desk    [] [phone] is available Monday through Friday 6 am to 6 pm    [x] Bring photo ID and insurance card    [] Bring in a copy of Living will or Durable Power of  papers. [x] Please be sure to arrange for responsible adult to provide transportation to and from the hospital    [x] Please arrange for responsible adult to be with you for the 24 hour period post procedure due to having anesthesia      GENERAL INSTRUCTIONS:    [x] Nothing by mouth after midnight, including gum, candy, mints or water    [x] You may brush your teeth, but do not swallow any water    [x] Take medications as instructed with 1-2 oz of water    [x] Stop herbal supplements and vitamins 5 days prior to procedure    [x] Follow preop dosing of blood thinners per physician instructions    [] Take 1/2 dose of evening insulin, but no insulin after midnight    [] No oral diabetic medications after midnight    [] If diabetic and have low blood sugar or feel symptomatic, take 1-2oz apple juice only    [] Bring inhalers day of surgery    [] Bring C-PAP/ Bi-Pap day of surgery    [] Bring urine specimen day of surgery    [x] Shower or bath with soap, lather and rinse well, AM of Surgery, no lotion, powders or creams to surgical site    [] Follow bowel prep as instructed per surgeon    [] No tobacco products within 24 hours of surgery     [] No alcohol or illegal drug use within 24 hours of surgery.     [x] Jewelry, body piercing's, eyeglasses, contact lenses and dentures are not permitted into surgery (bring cases)      [x] Please do not wear any nail polish, make up or hair

## 2019-09-05 ENCOUNTER — HOSPITAL ENCOUNTER (OUTPATIENT)
Age: 69
Setting detail: OUTPATIENT SURGERY
Discharge: HOME | End: 2019-09-05
Attending: ORTHOPAEDIC SURGERY | Admitting: ORTHOPAEDIC SURGERY
Payer: MEDICARE

## 2019-09-05 ENCOUNTER — ANESTHESIA EVENT (OUTPATIENT)
Dept: OPERATING ROOM | Age: 69
End: 2019-09-05
Payer: MEDICARE

## 2019-09-05 ENCOUNTER — ANESTHESIA (OUTPATIENT)
Dept: OPERATING ROOM | Age: 69
End: 2019-09-05
Payer: MEDICARE

## 2019-09-05 VITALS
HEART RATE: 62 BPM | DIASTOLIC BLOOD PRESSURE: 67 MMHG | SYSTOLIC BLOOD PRESSURE: 113 MMHG | BODY MASS INDEX: 28.25 KG/M2 | OXYGEN SATURATION: 92 % | HEIGHT: 67 IN | RESPIRATION RATE: 16 BRPM | TEMPERATURE: 97.1 F | WEIGHT: 180 LBS

## 2019-09-05 VITALS
DIASTOLIC BLOOD PRESSURE: 72 MMHG | TEMPERATURE: 95.7 F | SYSTOLIC BLOOD PRESSURE: 134 MMHG | RESPIRATION RATE: 14 BRPM | OXYGEN SATURATION: 99 %

## 2019-09-05 PROCEDURE — 6360000002 HC RX W HCPCS: Performed by: NURSE ANESTHETIST, CERTIFIED REGISTERED

## 2019-09-05 PROCEDURE — 29827 SHO ARTHRS SRG RT8TR CUF RPR: CPT | Performed by: ORTHOPAEDIC SURGERY

## 2019-09-05 PROCEDURE — L3650 SO 8 ABD RESTRAINT PRE OTS: HCPCS | Performed by: ORTHOPAEDIC SURGERY

## 2019-09-05 PROCEDURE — 3600000004 HC SURGERY LEVEL 4 BASE: Performed by: ORTHOPAEDIC SURGERY

## 2019-09-05 PROCEDURE — 29826 SHO ARTHRS SRG DECOMPRESSION: CPT | Performed by: ORTHOPAEDIC SURGERY

## 2019-09-05 PROCEDURE — 7100000011 HC PHASE II RECOVERY - ADDTL 15 MIN: Performed by: ORTHOPAEDIC SURGERY

## 2019-09-05 PROCEDURE — 64415 NJX AA&/STRD BRCH PLXS IMG: CPT | Performed by: ANESTHESIOLOGY

## 2019-09-05 PROCEDURE — 6360000002 HC RX W HCPCS: Performed by: ANESTHESIOLOGY

## 2019-09-05 PROCEDURE — 7100000001 HC PACU RECOVERY - ADDTL 15 MIN: Performed by: ORTHOPAEDIC SURGERY

## 2019-09-05 PROCEDURE — 2500000003 HC RX 250 WO HCPCS: Performed by: NURSE ANESTHETIST, CERTIFIED REGISTERED

## 2019-09-05 PROCEDURE — 2580000003 HC RX 258: Performed by: ORTHOPAEDIC SURGERY

## 2019-09-05 PROCEDURE — 3700000000 HC ANESTHESIA ATTENDED CARE: Performed by: ORTHOPAEDIC SURGERY

## 2019-09-05 PROCEDURE — 2720000010 HC SURG SUPPLY STERILE: Performed by: ORTHOPAEDIC SURGERY

## 2019-09-05 PROCEDURE — 6360000002 HC RX W HCPCS: Performed by: ORTHOPAEDIC SURGERY

## 2019-09-05 PROCEDURE — 2709999900 HC NON-CHARGEABLE SUPPLY: Performed by: ORTHOPAEDIC SURGERY

## 2019-09-05 PROCEDURE — 29828 SHO ARTHRS SRG BICP TENODSIS: CPT | Performed by: ORTHOPAEDIC SURGERY

## 2019-09-05 PROCEDURE — C1713 ANCHOR/SCREW BN/BN,TIS/BN: HCPCS | Performed by: ORTHOPAEDIC SURGERY

## 2019-09-05 PROCEDURE — 3700000001 HC ADD 15 MINUTES (ANESTHESIA): Performed by: ORTHOPAEDIC SURGERY

## 2019-09-05 PROCEDURE — 7100000000 HC PACU RECOVERY - FIRST 15 MIN: Performed by: ORTHOPAEDIC SURGERY

## 2019-09-05 PROCEDURE — 7100000010 HC PHASE II RECOVERY - FIRST 15 MIN: Performed by: ORTHOPAEDIC SURGERY

## 2019-09-05 PROCEDURE — 3600000014 HC SURGERY LEVEL 4 ADDTL 15MIN: Performed by: ORTHOPAEDIC SURGERY

## 2019-09-05 PROCEDURE — 2500000003 HC RX 250 WO HCPCS: Performed by: ORTHOPAEDIC SURGERY

## 2019-09-05 DEVICE — ANCHOR SUT L19.1MM DIA4.75MM BIOCOMPOSITE FULL THRD: Type: IMPLANTABLE DEVICE | Site: SHOULDER | Status: FUNCTIONAL

## 2019-09-05 DEVICE — ANCHOR SUTURE 4.75X19.1 MM TIG TAPE LOOP WHT BLK PUSHLOCK: Type: IMPLANTABLE DEVICE | Site: SHOULDER | Status: FUNCTIONAL

## 2019-09-05 DEVICE — ANCHOR SUTURE BIOCOMP 4.75X22 MM DBL LD WHT SWIVELOCK C: Type: IMPLANTABLE DEVICE | Site: SHOULDER | Status: FUNCTIONAL

## 2019-09-05 RX ORDER — LIDOCAINE HYDROCHLORIDE 20 MG/ML
INJECTION, SOLUTION EPIDURAL; INFILTRATION; INTRACAUDAL; PERINEURAL PRN
Status: DISCONTINUED | OUTPATIENT
Start: 2019-09-05 | End: 2019-09-05 | Stop reason: SDUPTHER

## 2019-09-05 RX ORDER — SODIUM CHLORIDE 9 MG/ML
INJECTION, SOLUTION INTRAVENOUS CONTINUOUS
Status: DISCONTINUED | OUTPATIENT
Start: 2019-09-05 | End: 2019-09-05 | Stop reason: HOSPADM

## 2019-09-05 RX ORDER — GLYCOPYRROLATE 1 MG/5 ML
SYRINGE (ML) INTRAVENOUS PRN
Status: DISCONTINUED | OUTPATIENT
Start: 2019-09-05 | End: 2019-09-05 | Stop reason: SDUPTHER

## 2019-09-05 RX ORDER — FENTANYL CITRATE 50 UG/ML
INJECTION, SOLUTION INTRAMUSCULAR; INTRAVENOUS PRN
Status: DISCONTINUED | OUTPATIENT
Start: 2019-09-05 | End: 2019-09-05 | Stop reason: SDUPTHER

## 2019-09-05 RX ORDER — FENTANYL CITRATE 50 UG/ML
INJECTION, SOLUTION INTRAMUSCULAR; INTRAVENOUS
Status: DISPENSED
Start: 2019-09-05 | End: 2019-09-05

## 2019-09-05 RX ORDER — SODIUM CHLORIDE 0.9 % (FLUSH) 0.9 %
10 SYRINGE (ML) INJECTION EVERY 12 HOURS SCHEDULED
Status: DISCONTINUED | OUTPATIENT
Start: 2019-09-05 | End: 2019-09-05 | Stop reason: HOSPADM

## 2019-09-05 RX ORDER — ROCURONIUM BROMIDE 10 MG/ML
INJECTION, SOLUTION INTRAVENOUS PRN
Status: DISCONTINUED | OUTPATIENT
Start: 2019-09-05 | End: 2019-09-05 | Stop reason: SDUPTHER

## 2019-09-05 RX ORDER — EPINEPHRINE 1 MG/ML
INJECTION, SOLUTION, CONCENTRATE INTRAVENOUS PRN
Status: DISCONTINUED | OUTPATIENT
Start: 2019-09-05 | End: 2019-09-05 | Stop reason: ALTCHOICE

## 2019-09-05 RX ORDER — ROPIVACAINE HYDROCHLORIDE 5 MG/ML
INJECTION, SOLUTION EPIDURAL; INFILTRATION; PERINEURAL
Status: DISPENSED
Start: 2019-09-05 | End: 2019-09-05

## 2019-09-05 RX ORDER — ONDANSETRON 2 MG/ML
INJECTION INTRAMUSCULAR; INTRAVENOUS PRN
Status: DISCONTINUED | OUTPATIENT
Start: 2019-09-05 | End: 2019-09-05 | Stop reason: SDUPTHER

## 2019-09-05 RX ORDER — SODIUM CHLORIDE 0.9 % (FLUSH) 0.9 %
10 SYRINGE (ML) INJECTION PRN
Status: DISCONTINUED | OUTPATIENT
Start: 2019-09-05 | End: 2019-09-05 | Stop reason: HOSPADM

## 2019-09-05 RX ORDER — DEXAMETHASONE SODIUM PHOSPHATE 4 MG/ML
INJECTION, SOLUTION INTRA-ARTICULAR; INTRALESIONAL; INTRAMUSCULAR; INTRAVENOUS; SOFT TISSUE PRN
Status: DISCONTINUED | OUTPATIENT
Start: 2019-09-05 | End: 2019-09-05 | Stop reason: SDUPTHER

## 2019-09-05 RX ORDER — ROPIVACAINE HYDROCHLORIDE 5 MG/ML
INJECTION, SOLUTION EPIDURAL; INFILTRATION; PERINEURAL
Status: COMPLETED | OUTPATIENT
Start: 2019-09-05 | End: 2019-09-05

## 2019-09-05 RX ORDER — MIDAZOLAM HYDROCHLORIDE 1 MG/ML
INJECTION INTRAMUSCULAR; INTRAVENOUS
Status: DISPENSED
Start: 2019-09-05 | End: 2019-09-05

## 2019-09-05 RX ORDER — FENTANYL CITRATE 50 UG/ML
25 INJECTION, SOLUTION INTRAMUSCULAR; INTRAVENOUS EVERY 5 MIN PRN
Status: DISCONTINUED | OUTPATIENT
Start: 2019-09-05 | End: 2019-09-05 | Stop reason: HOSPADM

## 2019-09-05 RX ORDER — ROPIVACAINE HYDROCHLORIDE 5 MG/ML
30 INJECTION, SOLUTION EPIDURAL; INFILTRATION; PERINEURAL ONCE
Status: COMPLETED | OUTPATIENT
Start: 2019-09-05 | End: 2019-09-05

## 2019-09-05 RX ORDER — PROPOFOL 10 MG/ML
INJECTION, EMULSION INTRAVENOUS PRN
Status: DISCONTINUED | OUTPATIENT
Start: 2019-09-05 | End: 2019-09-05 | Stop reason: SDUPTHER

## 2019-09-05 RX ORDER — FENTANYL CITRATE 50 UG/ML
25 INJECTION, SOLUTION INTRAMUSCULAR; INTRAVENOUS PRN
Status: DISCONTINUED | OUTPATIENT
Start: 2019-09-05 | End: 2019-09-05 | Stop reason: HOSPADM

## 2019-09-05 RX ORDER — NEOSTIGMINE METHYLSULFATE 1 MG/ML
INJECTION, SOLUTION INTRAVENOUS PRN
Status: DISCONTINUED | OUTPATIENT
Start: 2019-09-05 | End: 2019-09-05 | Stop reason: SDUPTHER

## 2019-09-05 RX ORDER — EPHEDRINE SULFATE/0.9% NACL/PF 50 MG/5 ML
SYRINGE (ML) INTRAVENOUS PRN
Status: DISCONTINUED | OUTPATIENT
Start: 2019-09-05 | End: 2019-09-05 | Stop reason: SDUPTHER

## 2019-09-05 RX ORDER — MIDAZOLAM HYDROCHLORIDE 1 MG/ML
1 INJECTION INTRAMUSCULAR; INTRAVENOUS EVERY 5 MIN PRN
Status: COMPLETED | OUTPATIENT
Start: 2019-09-05 | End: 2019-09-05

## 2019-09-05 RX ADMIN — ONDANSETRON HYDROCHLORIDE 4 MG: 2 INJECTION, SOLUTION INTRAMUSCULAR; INTRAVENOUS at 08:55

## 2019-09-05 RX ADMIN — ROCURONIUM BROMIDE 5 MG: 10 SOLUTION INTRAVENOUS at 08:40

## 2019-09-05 RX ADMIN — ROPIVACAINE HYDROCHLORIDE 30 ML: 5 INJECTION, SOLUTION EPIDURAL; INFILTRATION; PERINEURAL at 06:40

## 2019-09-05 RX ADMIN — Medication 10 MG: at 07:42

## 2019-09-05 RX ADMIN — FENTANYL CITRATE 50 MCG: 50 INJECTION, SOLUTION INTRAMUSCULAR; INTRAVENOUS at 07:22

## 2019-09-05 RX ADMIN — PROPOFOL 100 MG: 10 INJECTION, EMULSION INTRAVENOUS at 07:23

## 2019-09-05 RX ADMIN — FENTANYL CITRATE 50 MCG: 50 INJECTION INTRAMUSCULAR; INTRAVENOUS at 06:30

## 2019-09-05 RX ADMIN — Medication 2 G: at 07:13

## 2019-09-05 RX ADMIN — Medication 0.2 MG: at 07:41

## 2019-09-05 RX ADMIN — LIDOCAINE HYDROCHLORIDE 60 MG: 20 INJECTION, SOLUTION EPIDURAL; INFILTRATION; INTRACAUDAL; PERINEURAL at 07:22

## 2019-09-05 RX ADMIN — Medication 3 MG: at 08:55

## 2019-09-05 RX ADMIN — ROCURONIUM BROMIDE 40 MG: 10 SOLUTION INTRAVENOUS at 07:25

## 2019-09-05 RX ADMIN — MIDAZOLAM HYDROCHLORIDE 1 MG: 1 INJECTION, SOLUTION INTRAMUSCULAR; INTRAVENOUS at 06:30

## 2019-09-05 RX ADMIN — SODIUM CHLORIDE: 9 INJECTION, SOLUTION INTRAVENOUS at 08:00

## 2019-09-05 RX ADMIN — MIDAZOLAM HYDROCHLORIDE 1 MG: 1 INJECTION, SOLUTION INTRAMUSCULAR; INTRAVENOUS at 06:18

## 2019-09-05 RX ADMIN — DEXAMETHASONE SODIUM PHOSPHATE 10 MG: 4 INJECTION, SOLUTION INTRAMUSCULAR; INTRAVENOUS at 07:35

## 2019-09-05 RX ADMIN — Medication 0.2 MG: at 07:38

## 2019-09-05 RX ADMIN — FENTANYL CITRATE 50 MCG: 50 INJECTION INTRAMUSCULAR; INTRAVENOUS at 06:18

## 2019-09-05 RX ADMIN — SODIUM CHLORIDE: 9 INJECTION, SOLUTION INTRAVENOUS at 07:10

## 2019-09-05 RX ADMIN — ROPIVACAINE HYDROCHLORIDE 30 ML: 5 INJECTION, SOLUTION EPIDURAL; INFILTRATION; PERINEURAL at 06:54

## 2019-09-05 RX ADMIN — Medication 0.4 MG: at 08:55

## 2019-09-05 RX ADMIN — ROCURONIUM BROMIDE 5 MG: 10 SOLUTION INTRAVENOUS at 08:49

## 2019-09-05 ASSESSMENT — PULMONARY FUNCTION TESTS
PIF_VALUE: 24
PIF_VALUE: 24
PIF_VALUE: 25
PIF_VALUE: 20
PIF_VALUE: 18
PIF_VALUE: 24
PIF_VALUE: 24
PIF_VALUE: 25
PIF_VALUE: 2
PIF_VALUE: 1
PIF_VALUE: 19
PIF_VALUE: 23
PIF_VALUE: 24
PIF_VALUE: 19
PIF_VALUE: 24
PIF_VALUE: 25
PIF_VALUE: 25
PIF_VALUE: 1
PIF_VALUE: 25
PIF_VALUE: 19
PIF_VALUE: 24
PIF_VALUE: 25
PIF_VALUE: 24
PIF_VALUE: 1
PIF_VALUE: 23
PIF_VALUE: 25
PIF_VALUE: 24
PIF_VALUE: 24
PIF_VALUE: 23
PIF_VALUE: 19
PIF_VALUE: 25
PIF_VALUE: 8
PIF_VALUE: 25
PIF_VALUE: 46
PIF_VALUE: 3
PIF_VALUE: 24
PIF_VALUE: 23
PIF_VALUE: 2
PIF_VALUE: 50
PIF_VALUE: 24
PIF_VALUE: 24
PIF_VALUE: 19
PIF_VALUE: 23
PIF_VALUE: 24
PIF_VALUE: 25
PIF_VALUE: 24
PIF_VALUE: 19
PIF_VALUE: 27
PIF_VALUE: 24
PIF_VALUE: 19
PIF_VALUE: 25
PIF_VALUE: 25
PIF_VALUE: 3
PIF_VALUE: 25
PIF_VALUE: 25
PIF_VALUE: 18
PIF_VALUE: 24
PIF_VALUE: 25
PIF_VALUE: 24
PIF_VALUE: 24
PIF_VALUE: 25
PIF_VALUE: 2
PIF_VALUE: 25
PIF_VALUE: 23
PIF_VALUE: 24
PIF_VALUE: 23
PIF_VALUE: 25
PIF_VALUE: 0
PIF_VALUE: 25
PIF_VALUE: 16
PIF_VALUE: 25
PIF_VALUE: 20
PIF_VALUE: 24
PIF_VALUE: 25
PIF_VALUE: 25
PIF_VALUE: 3
PIF_VALUE: 24
PIF_VALUE: 2
PIF_VALUE: 24
PIF_VALUE: 16
PIF_VALUE: 25
PIF_VALUE: 1
PIF_VALUE: 25
PIF_VALUE: 24
PIF_VALUE: 0
PIF_VALUE: 22
PIF_VALUE: 24
PIF_VALUE: 24
PIF_VALUE: 3
PIF_VALUE: 25
PIF_VALUE: 24
PIF_VALUE: 25
PIF_VALUE: 21
PIF_VALUE: 22
PIF_VALUE: 1
PIF_VALUE: 0
PIF_VALUE: 2
PIF_VALUE: 25
PIF_VALUE: 24
PIF_VALUE: 24
PIF_VALUE: 25
PIF_VALUE: 23
PIF_VALUE: 24
PIF_VALUE: 24
PIF_VALUE: 23
PIF_VALUE: 23
PIF_VALUE: 25
PIF_VALUE: 25
PIF_VALUE: 24
PIF_VALUE: 24
PIF_VALUE: 25
PIF_VALUE: 23
PIF_VALUE: 24
PIF_VALUE: 26

## 2019-09-05 ASSESSMENT — PAIN SCALES - GENERAL
PAINLEVEL_OUTOF10: 0
PAINLEVEL_OUTOF10: 8
PAINLEVEL_OUTOF10: 0
PAINLEVEL_OUTOF10: 0
PAINLEVEL_OUTOF10: 8
PAINLEVEL_OUTOF10: 0
PAINLEVEL_OUTOF10: 8

## 2019-09-05 ASSESSMENT — PAIN DESCRIPTION - DESCRIPTORS: DESCRIPTORS: DISCOMFORT

## 2019-09-05 ASSESSMENT — PAIN - FUNCTIONAL ASSESSMENT: PAIN_FUNCTIONAL_ASSESSMENT: 0-10

## 2019-09-05 ASSESSMENT — PAIN DESCRIPTION - PAIN TYPE
TYPE: SURGICAL PAIN
TYPE: SURGICAL PAIN

## 2019-09-05 NOTE — H&P
[place]/Dr Eli Lucio  Cholecalciferol (VITAMIN D3) 2000 UNITS CAPS, Take 2,000 Units by mouth 2 times daily  chlorpheniramine (EQ CHLORTABS) 4 MG tablet, Take 4 mg by mouth every 6 hours as needed for Allergies  Multiple Vitamins-Minerals (MULTIVITAMIN & MINERAL PO), Take 1 tablet by mouth daily  calcium carbonate 600 MG TABS tablet, Take 1 tablet by mouth daily  omeprazole (PRILOSEC) 20 MG capsule, Take 20 mg by mouth every morning Instructed to take with sip water am of procedure  Omega-3 Fatty Acids (FISH OIL) 1000 MG CAPS, Take 2,000 mg by mouth 2 times daily   ibuprofen (ADVIL;MOTRIN) 400 MG tablet, Take 1 tablet by mouth daily as needed for Pain    Allergies:  Daypro [oxaprozin]; Demerol hcl [meperidine]; Levaquin [levofloxacin in d5w]; Tape [adhesive tape]; and Valium [diazepam]    History of allergic reaction to anesthesia:  No    Social History:   TOBACCO:   reports that she has never smoked. She has never used smokeless tobacco.  ETOH:   reports that she does not drink alcohol. DRUGS:   reports that she does not use drugs. Family History:   History reviewed. No pertinent family history. REVIEW OF SYSTEMS:  CONSTITUTIONAL:  negative  RESPIRATORY:  negative  CARDIOVASCULAR:  negative  MUSCULOSKELETAL:  negative except for  HPI  NEUROLOGICAL:  negative    PHYSICAL EXAM:     VITALS:  /80   Pulse 59   Temp 97.3 °F (36.3 °C) (Temporal)   Resp 26   Ht 5' 7\" (1.702 m)   Wt 180 lb (81.6 kg)   SpO2 96%   BMI 28.19 kg/m²     Gen: AOx3, NAD    Heart:  normal apical pulses, normal S1 and S2 and no edema    Lungs:  No increased work of breathing, good air exchange     Abdomen:  no scars, non-distended and non-tender    Extremities:  No clubbing, cyanosis, or edema    Musculoskeletal: On exam of the right shoulder, range of motion is 160/30/T10. There is mild pain reaching behind the back. Marlon test reveals mild pain but no weakness. Speed and [place]'s tests are both positive for pain in the shoulder.

## 2019-09-05 NOTE — OP NOTE
OPERATIVE REPORT    PATIENT:  Harsha Anderson  [id number]    DATE OF PROCEDURE:  9/5/19    SURGEON: Jesse Montes MD    ASSISTANT:  none    PREOPERATIVE DIAGNOSIS: rotator cuff tear,  Right shoulder. POSTOPERATIVE DIAGNOSIS: rotator cuff tear, Type II SLAP tear , Right shoulder. OPERATION:  Right shoulder arthroscopy, biceps tenodesis, subacromial decompression, rotator cuff repair     ANESTHESIA: . general and interscalene block    OPERATIVE INDICATIONS:  The patient is a 70 y.o. female with significant past medical history of right shoulder pain which has been present for many months. She has failed extensive conservative treatment. She has MRI showing appears to be full-thickness tear without retraction at the very anterior portion of the supraspinatus. She has had progressive pain as well as decreasing function. For these reasons she is interested in surgical management. We discussed this would involve right shoulder arthroscopy, rotator cuff repair versus debridement versus Regeneten patch augmentation, possible biceps tenotomy versus tenodesis. We discussed risks in detail including but not limited to infection, neurovascular injury, postoperative stiffness, re-tear, persistent pain, unforeseen risks. She understands and would like to proceed      OPERATIVE PROCEDURE: After satisfactory general anesthesia, as well as scalene block, the patient was placed supine on the operative table on a bean bag. The shoulder was examined under anesthesia and range of motion was noted to be 180 degrees forward elevation, and with the shoulder abducted 90 degrees, 90 external and 80 internal rotation. There was not increased translation with anterior/posterior load and shift. The patient was then turned into the lateral position with operative shoulder up. A bean bag was inflated to secure her in this position, and all bony prominences were well padded. An axillary roll was placed.   The arm was cleaned taken to recovery in stable condition. IMPLANTS:   Implant Name Type Inv. Item Serial No.  Lot No. LRB No. Used   ANCHOR SWIVELOCK BIO C W/FT LOOP 4.75 Fastener ANCHOR SWIVELOCK BIO C W/FT LOOP 4.75  [employer] [id number] Right 1   SWIVELOCK W/2SUTURE 4.75X22 BIOCOMPOSITE Fastener SWIVELOCK W/2SUTURE 4.75X22 BIOCOMPOSITE  [employer] [id number] Right 1   LOCK SWIVEL BIO COMP W/TT LOOP Fastener LOCK SWIVEL BIO COMP W/TT LOOP  [employer] [id number] Right 1   SWIVELOCK W/2SUTURE 8.30E73 BIOCOMPOSITE Fastener SWIVELOCK W/2SUTURE 4.75X22 BIOCOMPOSITE  [employer] [id number] Right 1         ESTIMATED BLOOD LOSS: 10 mL    COMPLICATIONS: none    POST OPERATIVE PLAN: The patient will discharged home from PACU once stable. Follow up in office will be post operative day 1 or 2. Dressing will stay on and ice applied until follow up. The patient will remain in the sling.         Ivonne Triplett MD  Orthopaedic Surgery   9/5/19  9:01 AM

## 2019-09-05 NOTE — ANESTHESIA PROCEDURE NOTES
Peripheral Block    Patient location during procedure: pre-op  Start time: 9/5/2019 6:30 AM  End time: 9/5/2019 6:45 AM  Staffing  Anesthesiologist: Symone John DO  Performed: anesthesiologist   Preanesthetic Checklist  Completed: patient identified, site marked, surgical consent, pre-op evaluation, timeout performed, IV checked, risks and benefits discussed, monitors and equipment checked, anesthesia consent given, oxygen available and patient being monitored  Peripheral Block  Patient position: sitting  Prep: ChloraPrep  Patient monitoring: cardiac monitor, continuous pulse ox, frequent blood pressure checks and IV access  Block type: Brachial plexus  Laterality: right  Injection technique: single-shot  Procedures: ultrasound guided  Local infiltration: lidocaine  Infiltration strength: 1 %  Dose: 3 mL  Interscalene  Provider prep: mask and sterile gloves  Local infiltration: lidocaine  Needle  Needle gauge: 21 G  Needle length: 10 cm  Needle localization: ultrasound guidance  Assessment  Injection assessment: negative aspiration for heme, no paresthesia on injection and local visualized surrounding nerve on ultrasound  Paresthesia pain: none  Slow fractionated injection: yes  Hemodynamics: stable  Additional Notes  U/S 36167.  (1) Under ultrasound guidance, a 21 gauge needle was inserted and placed in close proximity to the interscalene nerve.  (2) Ultrasound was also used to visualize the spread of the anesthetic in close proximity to the nerve being blocked. (3) The nerve appeared anatomically normal, and (4 there were no apparent abnormal pathological findings on the image that were readily visible and related to the nerve being blocked. (5) A permanent ultrasound image was saved in the patient's record.             Reason for block: post-op pain management and at surgeon's request

## 2019-09-06 ENCOUNTER — OFFICE VISIT (OUTPATIENT)
Dept: ORTHOPEDIC SURGERY | Age: 69
End: 2019-09-06

## 2019-09-06 VITALS — TEMPERATURE: 98.1 F | HEART RATE: 69 BPM | SYSTOLIC BLOOD PRESSURE: 127 MMHG | DIASTOLIC BLOOD PRESSURE: 75 MMHG

## 2019-09-06 DIAGNOSIS — Z98.890 S/P ARTHROSCOPY OF RIGHT SHOULDER: Primary | ICD-10-CM

## 2019-09-06 PROCEDURE — 99024 POSTOP FOLLOW-UP VISIT: CPT | Performed by: ORTHOPAEDIC SURGERY

## 2019-09-06 RX ORDER — HYDROCODONE BITARTRATE AND ACETAMINOPHEN 5; 325 MG/1; MG/1
TABLET ORAL
Refills: 0 | COMMUNITY
Start: 2019-08-28 | End: 2019-10-23

## 2019-09-06 RX ORDER — KETOROLAC TROMETHAMINE 10 MG/1
TABLET, FILM COATED ORAL
Refills: 0 | COMMUNITY
Start: 2019-08-28 | End: 2019-10-23

## 2019-09-06 NOTE — PROGRESS NOTES
Post Operative Visit     Surgery:  Right shoulder arthroscopy, biceps tenodesis, subacromial decompression, rotator cuff repair   Date: 9/5/19    Subjective:    Ken Suggs is here for follow up visit s/p above procedure. She is doing well. She has been compliant    Controlled Substances Monitoring:        Physical Exam:    Height: 5' 7\" (1.702 m), Weight: 180 lb (81.6 kg), BP: 113/67    On exam, incisions are intact. There is minimal swelling. There is intact sensation throughout the arm      Imaging: X-rays of the right shoulder were reviewed. These show no acute abnormality    Impression: Normal right shoulder x-ray    Assessment and Plan: Status post right rotator cuff repair and biceps tenodesis  She is doing well. She will continue with basic exercises including pendulums and passive forward elevation. She will remain in the sling at all times. She will ice and take medications. We will see her in 6 weeks and start her on PT.     Jasmina Flynn MD  Orthopaedic Surgery   9/6/19  7:58 AM

## 2019-09-12 DIAGNOSIS — E78.00 HYPERCHOLESTEROLEMIA: ICD-10-CM

## 2019-09-12 RX ORDER — EZETIMIBE 10 MG/1
10 TABLET ORAL DAILY
Qty: 90 TABLET | Refills: 1 | Status: SHIPPED | OUTPATIENT
Start: 2019-09-12 | End: 2020-05-19

## 2019-10-23 ENCOUNTER — OFFICE VISIT (OUTPATIENT)
Dept: ORTHOPEDIC SURGERY | Age: 69
End: 2019-10-23

## 2019-10-23 VITALS
BODY MASS INDEX: 28.25 KG/M2 | HEART RATE: 61 BPM | DIASTOLIC BLOOD PRESSURE: 73 MMHG | SYSTOLIC BLOOD PRESSURE: 114 MMHG | WEIGHT: 180 LBS | HEIGHT: 67 IN

## 2019-10-23 DIAGNOSIS — Z98.890 S/P ARTHROSCOPY OF RIGHT SHOULDER: Primary | ICD-10-CM

## 2019-10-23 DIAGNOSIS — M75.101 TEAR OF RIGHT ROTATOR CUFF, UNSPECIFIED TEAR EXTENT, UNSPECIFIED WHETHER TRAUMATIC: ICD-10-CM

## 2019-10-23 PROCEDURE — 99024 POSTOP FOLLOW-UP VISIT: CPT | Performed by: ORTHOPAEDIC SURGERY

## 2019-11-05 ENCOUNTER — HOSPITAL ENCOUNTER (OUTPATIENT)
Dept: PHYSICAL THERAPY | Age: 69
Setting detail: THERAPIES SERIES
Discharge: HOME | End: 2019-11-05
Payer: MEDICARE

## 2019-11-05 PROCEDURE — 97161 PT EVAL LOW COMPLEX 20 MIN: CPT | Performed by: PHYSICAL THERAPIST

## 2019-11-05 PROCEDURE — 97110 THERAPEUTIC EXERCISES: CPT | Performed by: PHYSICAL THERAPIST

## 2019-11-05 ASSESSMENT — PAIN SCALES - GENERAL: PAINLEVEL_OUTOF10: 5

## 2019-11-05 ASSESSMENT — PAIN DESCRIPTION - DESCRIPTORS: DESCRIPTORS: BURNING

## 2019-11-05 ASSESSMENT — PAIN DESCRIPTION - LOCATION: LOCATION: SHOULDER

## 2019-11-05 ASSESSMENT — PAIN DESCRIPTION - PAIN TYPE: TYPE: SURGICAL PAIN

## 2019-11-05 ASSESSMENT — PAIN DESCRIPTION - ORIENTATION: ORIENTATION: RIGHT

## 2019-11-07 ENCOUNTER — HOSPITAL ENCOUNTER (OUTPATIENT)
Dept: PHYSICAL THERAPY | Age: 69
Setting detail: THERAPIES SERIES
Discharge: HOME | End: 2019-11-07
Payer: MEDICARE

## 2019-11-07 PROCEDURE — 97110 THERAPEUTIC EXERCISES: CPT | Performed by: PHYSICAL THERAPIST

## 2019-11-12 ENCOUNTER — HOSPITAL ENCOUNTER (OUTPATIENT)
Dept: PHYSICAL THERAPY | Age: 69
Setting detail: THERAPIES SERIES
Discharge: HOME | End: 2019-11-12
Payer: MEDICARE

## 2019-11-12 PROCEDURE — 97110 THERAPEUTIC EXERCISES: CPT

## 2019-11-15 ENCOUNTER — HOSPITAL ENCOUNTER (OUTPATIENT)
Dept: PHYSICAL THERAPY | Age: 69
Setting detail: THERAPIES SERIES
Discharge: HOME | End: 2019-11-15
Payer: MEDICARE

## 2019-11-15 PROCEDURE — 97110 THERAPEUTIC EXERCISES: CPT | Performed by: PHYSICAL THERAPIST

## 2019-11-19 ENCOUNTER — HOSPITAL ENCOUNTER (OUTPATIENT)
Dept: PHYSICAL THERAPY | Age: 69
Setting detail: THERAPIES SERIES
Discharge: HOME | End: 2019-11-19
Payer: MEDICARE

## 2019-11-19 PROCEDURE — 97110 THERAPEUTIC EXERCISES: CPT | Performed by: PHYSICAL THERAPIST

## 2019-11-21 ENCOUNTER — HOSPITAL ENCOUNTER (OUTPATIENT)
Dept: PHYSICAL THERAPY | Age: 69
Setting detail: THERAPIES SERIES
Discharge: HOME | End: 2019-11-21
Payer: MEDICARE

## 2019-11-21 PROCEDURE — 97110 THERAPEUTIC EXERCISES: CPT | Performed by: PHYSICAL THERAPIST

## 2019-11-25 ENCOUNTER — HOSPITAL ENCOUNTER (OUTPATIENT)
Dept: PHYSICAL THERAPY | Age: 69
Setting detail: THERAPIES SERIES
Discharge: HOME | End: 2019-11-25
Payer: MEDICARE

## 2019-11-25 PROCEDURE — 97110 THERAPEUTIC EXERCISES: CPT

## 2019-12-03 ENCOUNTER — HOSPITAL ENCOUNTER (OUTPATIENT)
Dept: PHYSICAL THERAPY | Age: 69
Setting detail: THERAPIES SERIES
Discharge: HOME | End: 2019-12-03
Payer: MEDICARE

## 2019-12-03 PROCEDURE — 97110 THERAPEUTIC EXERCISES: CPT | Performed by: PHYSICAL THERAPIST

## 2019-12-05 ENCOUNTER — HOSPITAL ENCOUNTER (OUTPATIENT)
Dept: PHYSICAL THERAPY | Age: 69
Setting detail: THERAPIES SERIES
Discharge: HOME | End: 2019-12-05
Payer: MEDICARE

## 2019-12-05 PROCEDURE — 97110 THERAPEUTIC EXERCISES: CPT | Performed by: PHYSICAL THERAPIST

## 2019-12-06 ENCOUNTER — OFFICE VISIT (OUTPATIENT)
Dept: ORTHOPEDIC SURGERY | Age: 69
End: 2019-12-06
Payer: MEDICARE

## 2019-12-06 VITALS
SYSTOLIC BLOOD PRESSURE: 124 MMHG | DIASTOLIC BLOOD PRESSURE: 64 MMHG | BODY MASS INDEX: 27.47 KG/M2 | HEIGHT: 67 IN | HEART RATE: 70 BPM | WEIGHT: 175 LBS

## 2019-12-06 DIAGNOSIS — Z98.890 S/P ARTHROSCOPY OF RIGHT SHOULDER: Primary | ICD-10-CM

## 2019-12-06 PROCEDURE — 99213 OFFICE O/P EST LOW 20 MIN: CPT | Performed by: ORTHOPAEDIC SURGERY

## 2019-12-11 ENCOUNTER — HOSPITAL ENCOUNTER (OUTPATIENT)
Dept: PHYSICAL THERAPY | Age: 69
Setting detail: THERAPIES SERIES
Discharge: HOME | End: 2019-12-11
Payer: MEDICARE

## 2019-12-11 PROCEDURE — 97110 THERAPEUTIC EXERCISES: CPT | Performed by: PHYSICAL THERAPIST

## 2019-12-13 ENCOUNTER — HOSPITAL ENCOUNTER (OUTPATIENT)
Dept: PHYSICAL THERAPY | Age: 69
Setting detail: THERAPIES SERIES
Discharge: HOME | End: 2019-12-13
Payer: MEDICARE

## 2019-12-13 PROCEDURE — 97110 THERAPEUTIC EXERCISES: CPT | Performed by: PHYSICAL THERAPIST

## 2019-12-17 ENCOUNTER — HOSPITAL ENCOUNTER (OUTPATIENT)
Dept: PHYSICAL THERAPY | Age: 69
Setting detail: THERAPIES SERIES
Discharge: HOME | End: 2019-12-17
Payer: MEDICARE

## 2019-12-17 PROCEDURE — 97110 THERAPEUTIC EXERCISES: CPT | Performed by: PHYSICAL THERAPIST

## 2019-12-19 ENCOUNTER — HOSPITAL ENCOUNTER (OUTPATIENT)
Dept: PHYSICAL THERAPY | Age: 69
Setting detail: THERAPIES SERIES
Discharge: HOME | End: 2019-12-19
Payer: MEDICARE

## 2019-12-19 PROCEDURE — 97110 THERAPEUTIC EXERCISES: CPT | Performed by: PHYSICAL THERAPIST

## 2019-12-26 ENCOUNTER — HOSPITAL ENCOUNTER (OUTPATIENT)
Dept: PHYSICAL THERAPY | Age: 69
Setting detail: THERAPIES SERIES
Discharge: HOME | End: 2019-12-26
Payer: MEDICARE

## 2019-12-26 PROCEDURE — 97110 THERAPEUTIC EXERCISES: CPT

## 2019-12-29 DIAGNOSIS — M19.049 HAND ARTHRITIS: ICD-10-CM

## 2020-01-03 ENCOUNTER — HOSPITAL ENCOUNTER (OUTPATIENT)
Dept: PHYSICAL THERAPY | Age: 70
Setting detail: THERAPIES SERIES
Discharge: HOME | End: 2020-01-03
Payer: MEDICARE

## 2020-01-03 PROCEDURE — 97110 THERAPEUTIC EXERCISES: CPT | Performed by: PHYSICAL THERAPIST

## 2020-01-03 NOTE — PROGRESS NOTES
[address] [address] [place]      Phone: [phone]  Fax: [phone]    Physical Therapy Daily Treatment Note  Date:  1/3/2020    Patient Name:  Belinda Herrera    :  1950  MRN: [id number]    Restrictions/Precautions:    Diagnosis:  s/p arthroscopy of the right shoulder, right rotator cuff tear  Treatment Diagnosis:    Insurance/Certification information:  [employer]  Referring Physician:  Jill Berry MD  Plan of care signed (Y/N):    Visit# / total visits:  15/18  Pain level: 1/10   Time In:  0930  Time Out:  1025    Subjective:  Pt reports her whole arm is achy today, possibly due to arthritis.     Exercises:  Exercise/Equipment Resistance/Repetitions Other comments     pulleys 5 minutes      UBE 4 min fwd/4 min bwd Level 2     Wand shoulder abd  2# 5 sec x 10 reps      Wand shoulder flex and ER supine 2# 5 sec x 10 reps each      rows GTB 2 x 10 reps      IR stretch with towel 10 sec x 5 reps      SL ABD and ER 2# 2 x 10 reps each      Shoulder stabilization Ball on wall, 1#, up/down, right/left, CW, and CCW x 10 reps each        Bent over shoulder flex, ext, horizontal abd 2# x 10 reps each       Shoulder IR, ER, ext GTB x 10 reps each                                                              Other   Pt tolerated exercise progressions    Home Exercise Program:  19 - wall slides flex and abd 19: SL ABD    Manual Treatments:  N/A    Modalities:  HP to right shoulder x 10 minutes    Timed Code Treatment Minutes:  40    Total Treatment Minutes:  55    Treatment/Activity Tolerance:  [x] Patient tolerated treatment well [] Patient limited by fatigue  [] Patient limited by pain  [] Patient limited by other medical complications  [] Other:     Prognosis: [x] Good [] Fair  [] Poor    Patient Requires Follow-up: [x] Yes  [] No    Plan:   [x] Continue per plan of care [] Alter current plan (see comments)  [] Plan of care initiated [] Hold pending MD visit [] Discharge  Plan for Next Session:        Electronically signed by:  OFELIA Osuna 255

## 2020-01-07 ENCOUNTER — HOSPITAL ENCOUNTER (OUTPATIENT)
Dept: PHYSICAL THERAPY | Age: 70
Setting detail: THERAPIES SERIES
Discharge: HOME | End: 2020-01-07
Payer: MEDICARE

## 2020-01-07 PROCEDURE — 97110 THERAPEUTIC EXERCISES: CPT | Performed by: PHYSICAL THERAPIST

## 2020-01-07 NOTE — PROGRESS NOTES
[address] [address] [place]      Phone: [phone]  Fax: [phone]    Physical Therapy Daily Treatment Note  Date:  2020    Patient Name:  Cary Sender    :  1950  MRN: [id number]    Restrictions/Precautions:    Diagnosis:  s/p arthroscopy of the right shoulder, right rotator cuff tear  Treatment Diagnosis:    Insurance/Certification information:  [employer] Plan  Referring Physician:  Danitza Guerin MD  Plan of care signed (Y/N):    Visit# / total visits:    Pain level: 1/10   Time In:  0931  Time Out:  1020    Subjective:  Pt reports shoulder is feeling really good, but that she continues to have pain in the biceps muscle.     Exercises:  Exercise/Equipment Resistance/Repetitions Other comments     pulleys 5 minutes      UBE 4 min fwd/4 min bwd Level 2     Wand shoulder abd  2# 5 sec x 10 reps      Wand shoulder flex and ER supine 2# 5 sec x 10 reps each      rows GTB 2 x 10 reps      IR stretch with towel 10 sec x 5 reps      SL ABD and ER 2# 2 x 10 reps each      Shoulder stabilization Ball on wall, 1#, up/down, right/left, CW, and CCW x 10 reps each        Bent over shoulder flex, ext, horizontal abd 2# x 10 reps each       Shoulder IR, ER, ext GTB x 10 reps each      Biceps stretch in doorway  15 sec x 3 reps                                                       Other   Pt tolerated exercise progressions    Home Exercise Program:  19 - wall slides flex and abd 19: SL ABD; 20 - biceps muscle stretch    Manual Treatments:  N/A    Modalities:  HP to right shoulder x 10 minutes    Timed Code Treatment Minutes:  40    Total Treatment Minutes:  50    Treatment/Activity Tolerance:  [x] Patient tolerated treatment well [] Patient limited by fatigue  [] Patient limited by pain  [] Patient limited by other medical complications  [] Other:     Prognosis: [x] Good [] Fair  [] Poor    Patient Requires Follow-up: [x] Yes  [] No    Plan:   [x] Continue per plan of care [] Alter current plan (see comments)  [] Plan of care initiated [] Hold pending MD visit [] Discharge  Plan for Next Session:        Electronically signed by:  Sarah Maldonado, PT [id number]

## 2020-01-09 ENCOUNTER — HOSPITAL ENCOUNTER (OUTPATIENT)
Dept: PHYSICAL THERAPY | Age: 70
Setting detail: THERAPIES SERIES
Discharge: HOME | End: 2020-01-09
Payer: MEDICARE

## 2020-01-09 PROCEDURE — 97110 THERAPEUTIC EXERCISES: CPT | Performed by: PHYSICAL THERAPIST

## 2020-01-09 NOTE — PROGRESS NOTES
[address] [address] [place]      Phone: [phone]  Fax: [phone]    Physical Therapy Daily Treatment Note  Date:  2020    Patient Name:  Jud Roman    :  1950  MRN: [id number]    Restrictions/Precautions:    Diagnosis:  s/p arthroscopy of the right shoulder, right rotator cuff tear  Treatment Diagnosis:    Insurance/Certification information:  Madiha Garcia  Referring Physician:  Olive Carlton MD  Plan of care signed (Y/N):    Visit# / total visits:    Pain level: 1/10   Time In:  6466  Time Out:  1015    Subjective:  Pt reports denies any stiffness in shoulder, states the biceps is the same.     Exercises:  Exercise/Equipment Resistance/Repetitions Other comments     pulleys 5 minutes      UBE 4 min fwd/4 min bwd Level 2     Wand shoulder abd  3# 5 sec x 10 reps      Wand shoulder flex and ER supine 3# 5 sec x 10 reps each      rows GTB 2 x 10 reps      IR stretch with towel 10 sec x 5 reps      SL ABD and ER 2# 2 x 10 reps each      Shoulder stabilization Ball on wall, 1#, up/down, right/left, CW, and CCW x 10 reps each        Bent over shoulder flex, ext, horizontal abd 2# x 10 reps each       Shoulder IR, ER, ext GTB x 10 reps each      Biceps stretch in doorway  15 sec x 3 reps                                                       Other   Pt tolerated exercise progressions    Home Exercise Program:  19 - wall slides flex and abd 19: SL ABD; 20 - biceps muscle stretch    Manual Treatments:  N/A    Modalities:     Pt declined    Timed Code Treatment Minutes:  41    Total Treatment Minutes:  41    Treatment/Activity Tolerance:  [x] Patient tolerated treatment well [] Patient limited by fatigue  [] Patient limited by pain  [] Patient limited by other medical complications  [] Other:     Prognosis: [x] Good [] Fair  [] Poor    Patient Requires Follow-up: [x] Yes  [] No    Plan:   [x] Continue per plan of care [] Alter current plan

## 2020-01-14 ENCOUNTER — HOSPITAL ENCOUNTER (OUTPATIENT)
Dept: PHYSICAL THERAPY | Age: 70
Setting detail: THERAPIES SERIES
Discharge: HOME | End: 2020-01-14
Payer: MEDICARE

## 2020-01-14 PROCEDURE — 97110 THERAPEUTIC EXERCISES: CPT | Performed by: PHYSICAL THERAPIST

## 2020-01-14 NOTE — PROGRESS NOTES
[address]      Phone: [phone]  Fax: [phone]    Physical Therapy Daily Treatment Note  Date:  2020    Patient Name:  Jolanta Walker    :  1950  MRN: [id number]    Restrictions/Precautions:    Diagnosis:  s/p arthroscopy of the right shoulder, right rotator cuff tear  Treatment Diagnosis:    Insurance/Certification information:  Krissy Perez  Referring Physician:  Megan Kelly MD  Plan of care signed (Y/N):    Visit# / total visits:    Pain level: 1/10   Time In:  930  Time Out:  1005    Subjective:  Pt reports that the shoulder is starting to feel better.     Exercises:  Exercise/Equipment Resistance/Repetitions Other comments     pulleys 5 minutes      UBE 4 min fwd/4 min bwd Level 2     Wand shoulder abd  3# 5 sec x 10 reps      Wand shoulder flex and ER supine 3# 5 sec x 10 reps each      rows GTB 2 x 10 reps      IR stretch with towel 10 sec x 5 reps      SL ABD and ER 2# 2 x 10 reps each      Shoulder stabilization Ball on wall, 1#, up/down, right/left, CW, and CCW x 10 reps each        Bent over shoulder flex, ext, horizontal abd 2# x 10 reps each       Shoulder IR, ER, ext GTB x 10 reps each                           MMT 20 Shoulder flex and abd 4-/5  Shoulder IR and ER 4/5                           Other   Pt tolerated exercise progressions    Home Exercise Program:  19 - wall slides flex and abd 19: SL ABD; 20 - biceps muscle stretch    Manual Treatments:  N/A    Modalities:     Pt declined    Timed Code Treatment Minutes:  35    Total Treatment Minutes:  35    Treatment/Activity Tolerance:  [x] Patient tolerated treatment well [] Patient limited by fatigue  [] Patient limited by pain  [] Patient limited by other medical complications  [] Other:     Prognosis: [x] Good [] Fair  [] Poor    Patient Requires Follow-up: [] Yes  [x] No    Plan:   [] Continue per plan of care [] Alter current plan (see

## 2020-01-14 NOTE — DISCHARGE SUMMARY
[address]      Phone: [phone]  Fax: [phone]    Physical Therapy  Out Patient Discharge Summary     Date:  2020    Patient Name:  Stanley Quintanilla    :  1950  MRN: [id number]    DIAGNOSIS:  s/p arthroscopy of the right shoulder, right rotator cuff tear  REFERRING PHYSICIAN:  Pam Avila MD    ATTENDANCE:  Pt has attended 25 of 18 scheduled treatments from 19 to 19. TREATMENTS RECEIVED:  Stretching/ROM, strength training, education in a HEP    INITIAL STATUS:  · Pain right shoulder 5/10  · AROM right shoulder: flex 140°, abd 85°, IR 65°, ER 70°  · Right shoulder strength 4-/5 throughout    FINAL STATUS:  · Pain right shoulder 10  · AROM right shoulder WFL all planes  · Right shoulder strength: flex and abd 4-/5, IR 4+/5, ER 4/5  · Pt is able to perform all daily activities using her R UE without difficulty     GOALS:  3 out of 4 Long Term Goals were obtained. LONG TERM GOALS NOT OBTAINED/REASON:  All goals met except pt continues to demonstrate weakness in the shoulder. Pt is independent with HEP and can continue to work or strengthening on her own. PATIENT GOALS:  To regain use of right arm    REASON FOR DISCHARGE:  Pt has met most goals and received maximum benefit from PT. PATIENT EDUCATION/INSTRUCTIONS:  Pt provided with a HEP of shoulder ROM and strength training activites    RECOMMENDATIONS:  Discharge to HEP        Thank you for the opportunity to work with your patient. If you have questions or comments, please feel free to contact me by phone or fax.       Electronically Signed by: Sofya Lux, PT [id number]  2020

## 2020-01-15 ENCOUNTER — OFFICE VISIT (OUTPATIENT)
Dept: PRIMARY CARE CLINIC | Age: 70
End: 2020-01-15
Payer: MEDICARE

## 2020-01-15 ENCOUNTER — HOSPITAL ENCOUNTER (OUTPATIENT)
Age: 70
Discharge: HOME | End: 2020-01-17
Payer: MEDICARE

## 2020-01-15 VITALS
SYSTOLIC BLOOD PRESSURE: 130 MMHG | DIASTOLIC BLOOD PRESSURE: 80 MMHG | WEIGHT: 180 LBS | HEART RATE: 61 BPM | OXYGEN SATURATION: 98 % | BODY MASS INDEX: 28.19 KG/M2 | TEMPERATURE: 97.1 F

## 2020-01-15 LAB
ALBUMIN SERPL-MCNC: 4.4 G/DL (ref 3.5–5.2)
ALP BLD-CCNC: 58 U/L (ref 35–104)
ALT SERPL-CCNC: 32 U/L (ref 0–32)
ANION GAP SERPL CALCULATED.3IONS-SCNC: 17 MMOL/L (ref 7–16)
AST SERPL-CCNC: 29 U/L (ref 0–31)
BILIRUB SERPL-MCNC: 0.5 MG/DL (ref 0–1.2)
BUN BLDV-MCNC: 19 MG/DL (ref 8–23)
CALCIUM SERPL-MCNC: 9.7 MG/DL (ref 8.6–10.2)
CHLORIDE BLD-SCNC: 100 MMOL/L (ref 98–107)
CHOLESTEROL, TOTAL: 251 MG/DL (ref 0–199)
CO2: 22 MMOL/L (ref 22–29)
CREAT SERPL-MCNC: 0.7 MG/DL (ref 0.5–1)
GFR AFRICAN AMERICAN: >60
GFR NON-AFRICAN AMERICAN: >60 ML/MIN/1.73
GLUCOSE BLD-MCNC: 93 MG/DL (ref 74–99)
HCT VFR BLD CALC: 42.7 % (ref 34–48)
HDLC SERPL-MCNC: 46 MG/DL
HEMOGLOBIN: 12.9 G/DL (ref 11.5–15.5)
LDL CHOLESTEROL CALCULATED: 138 MG/DL (ref 0–99)
MCH RBC QN AUTO: 28.8 PG (ref 26–35)
MCHC RBC AUTO-ENTMCNC: 30.2 % (ref 32–34.5)
MCV RBC AUTO: 95.3 FL (ref 80–99.9)
PDW BLD-RTO: 13.8 FL (ref 11.5–15)
PLATELET # BLD: 228 E9/L (ref 130–450)
PMV BLD AUTO: 11.7 FL (ref 7–12)
POTASSIUM SERPL-SCNC: 4.3 MMOL/L (ref 3.5–5)
RBC # BLD: 4.48 E12/L (ref 3.5–5.5)
SODIUM BLD-SCNC: 139 MMOL/L (ref 132–146)
TOTAL PROTEIN: 7.4 G/DL (ref 6.4–8.3)
TRIGL SERPL-MCNC: 335 MG/DL (ref 0–149)
VLDLC SERPL CALC-MCNC: 67 MG/DL
WBC # BLD: 6.5 E9/L (ref 4.5–11.5)

## 2020-01-15 PROCEDURE — 80053 COMPREHEN METABOLIC PANEL: CPT

## 2020-01-15 PROCEDURE — 85027 COMPLETE CBC AUTOMATED: CPT

## 2020-01-15 PROCEDURE — 99213 OFFICE O/P EST LOW 20 MIN: CPT | Performed by: INTERNAL MEDICINE

## 2020-01-15 PROCEDURE — 80061 LIPID PANEL: CPT

## 2020-01-15 ASSESSMENT — PATIENT HEALTH QUESTIONNAIRE - PHQ9
2. FEELING DOWN, DEPRESSED OR HOPELESS: 0
1. LITTLE INTEREST OR PLEASURE IN DOING THINGS: 0
SUM OF ALL RESPONSES TO PHQ QUESTIONS 1-9: 0
SUM OF ALL RESPONSES TO PHQ9 QUESTIONS 1 & 2: 0
SUM OF ALL RESPONSES TO PHQ QUESTIONS 1-9: 0

## 2020-01-15 NOTE — PROGRESS NOTES
1/15/20    Blayne Garcia, a female of 78 y.o. came to the office     Blayne Garcia presents today for routine follow-up. She had arthroscopic surgery on her right shoulder. She just finished physical therapy. She has been doing exercises at home. Her pain has migrated. She has been constipated from pain medication. She has been having a flare of her hemorrhoids and had rectal bleeding. It has been 4 days since she has had rectal bleeding. She does have bright red blood on the toilet paper. She has been doing well on zetia and denies any problems with this medicaiton. She has been taking voltaren as well.      Patient Active Problem List   Diagnosis    Chest pain    Hyperlipidemia LDL goal <100    Acute cystitis    Chronic left shoulder pain      Allergies   Allergen Reactions    Daypro [Oxaprozin]     Demerol Hcl [Meperidine]     Levaquin [Levofloxacin In D5w]     Tape [Adhesive Tape] Itching     Ok to use paper tape per pt    Valium [Diazepam]      Current Outpatient Medications on File Prior to Visit   Medication Sig Dispense Refill    diclofenac (VOLTAREN) 50 MG EC tablet TAKE ONE TABLET BY MOUTH THREE TIMES A DAY WITH MEALS 90 tablet 1    ezetimibe (ZETIA) 10 MG tablet Take 1 tablet by mouth daily 90 tablet 1    citalopram (CELEXA) 20 MG tablet Take 1 tablet by mouth daily 90 tablet 1    sodium chloride (OCEAN, BABY AYR) 0.65 % nasal spray 1 spray by Nasal route as needed for Congestion      aspirin 81 MG tablet Take 81 mg by mouth daily [facility]/Dr Shagufta Hsu Cholecalciferol (VITAMIN D3) 2000 UNITS CAPS Take 2,000 Units by mouth 2 times daily      chlorpheniramine (EQ CHLORTABS) 4 MG tablet Take 4 mg by mouth every 6 hours as needed for Allergies      Multiple Vitamins-Minerals (MULTIVITAMIN & MINERAL PO) Take 1 tablet by mouth daily      calcium carbonate 600 MG TABS tablet Take 1 tablet by mouth daily      omeprazole (PRILOSEC) 20 MG capsule Take 20 mg by mouth every morning Instructed to

## 2020-01-17 ENCOUNTER — OFFICE VISIT (OUTPATIENT)
Dept: ORTHOPEDIC SURGERY | Age: 70
End: 2020-01-17
Payer: MEDICARE

## 2020-01-17 VITALS
SYSTOLIC BLOOD PRESSURE: 108 MMHG | DIASTOLIC BLOOD PRESSURE: 72 MMHG | HEIGHT: 65 IN | HEART RATE: 74 BPM | WEIGHT: 180 LBS | BODY MASS INDEX: 29.99 KG/M2

## 2020-01-17 PROCEDURE — 99213 OFFICE O/P EST LOW 20 MIN: CPT | Performed by: ORTHOPAEDIC SURGERY

## 2020-01-17 PROCEDURE — 20610 DRAIN/INJ JOINT/BURSA W/O US: CPT | Performed by: ORTHOPAEDIC SURGERY

## 2020-01-17 RX ORDER — LIDOCAINE HYDROCHLORIDE 10 MG/ML
4 INJECTION, SOLUTION INFILTRATION; PERINEURAL ONCE
Status: COMPLETED | OUTPATIENT
Start: 2020-01-17 | End: 2020-01-17

## 2020-01-17 RX ORDER — TRIAMCINOLONE ACETONIDE 40 MG/ML
40 INJECTION, SUSPENSION INTRA-ARTICULAR; INTRAMUSCULAR ONCE
Status: COMPLETED | OUTPATIENT
Start: 2020-01-17 | End: 2020-01-17

## 2020-01-17 RX ADMIN — LIDOCAINE HYDROCHLORIDE 4 ML: 10 INJECTION, SOLUTION INFILTRATION; PERINEURAL at 10:53

## 2020-01-17 RX ADMIN — TRIAMCINOLONE ACETONIDE 40 MG: 40 INJECTION, SUSPENSION INTRA-ARTICULAR; INTRAMUSCULAR at 10:53

## 2020-01-17 NOTE — PROGRESS NOTES
Follow Up Post Operative Visit     Surgery:  Right shoulder arthroscopy, biceps tenodesis, subacromial decompression, rotator cuff repair   Date: 9/5/19    Subjective:    Diana Esposito is here for follow up visit s/p above procedure. She is doing well. She has been continue to work on exercises on her own as she has completed therapy. She has good range of motion and strength but has some occasional pain along her biceps. In terms of the left shoulder, she continues to have pain as well as some mild weakness    Controlled Substances Monitoring:        Physical Exam:    Height: 5' 5\" (1.651 m), Weight: 180 lb (81.6 kg), BP: 108/72    General: Alert and oriented x3, no acute distress  Cardiovascular/pulmonary: No labored breathing, peripheral perfusion intact  Musculoskeletal:    On exam, range of motion of the right shoulder is about 160/45/T6. Marlon, Speed, [place]'s tests are intact. Belly press is intact. Left shoulder range of motion is 150/30/T10. There is mild pain with Marlon test but no weakness. Speed and Story's tests are intact      Imaging: No new imaging today. Previous MRI of the left shoulder was reviewed showing tendinosis and possible partial tearing of supraspinatus. Procedure Note:  Left Shoulder steroid injection     The Left shoulder was identified as the injection site. The risk and benefits of a cortisone injection were explained and the patient consented to the injection. Under sterile conditions, the subacromial space was injected from posterior approach with a mixture of 40 mg of Kenalog, 4 cc  1% Lidocaine without complication. A sterile bandage was applied.     Administrations This Visit     lidocaine 1 % injection 4 mL     Admin Date  01/17/2020 Action  Given Dose  4 mL Route  Intra-articular Administered By  David Ace RN          triamcinolone acetonide [facility]IA [facility]) injection 40 mg     Admin Date  01/17/2020 Action  Given Dose  40 mg Route  Intra-articular

## 2020-02-19 RX ORDER — CITALOPRAM 20 MG/1
20 TABLET ORAL DAILY
Qty: 90 TABLET | Refills: 1 | Status: SHIPPED
Start: 2020-02-19 | End: 2020-08-13 | Stop reason: SDUPTHER

## 2020-02-28 ENCOUNTER — OFFICE VISIT (OUTPATIENT)
Dept: ORTHOPEDIC SURGERY | Age: 70
End: 2020-02-28
Payer: MEDICARE

## 2020-02-28 VITALS
HEIGHT: 65 IN | SYSTOLIC BLOOD PRESSURE: 132 MMHG | WEIGHT: 177 LBS | HEART RATE: 62 BPM | BODY MASS INDEX: 29.49 KG/M2 | DIASTOLIC BLOOD PRESSURE: 80 MMHG

## 2020-02-28 PROCEDURE — 99213 OFFICE O/P EST LOW 20 MIN: CPT | Performed by: ORTHOPAEDIC SURGERY

## 2020-02-28 NOTE — PROGRESS NOTES
Follow Up Post Operative Visit     Surgery:  Right shoulder arthroscopy, biceps tenodesis, subacromial decompression, rotator cuff repair   Date: 9/5/19    Subjective:    Juvenal Jordan is here for follow up visit s/p above procedure. She is doing well with her surgery shoulder, reporting full range of motion and minimal pain. She is having progressive pain and decreasing function on the left side, where she has a known full-thickness tear without retraction. Controlled Substances Monitoring:        Physical Exam:    Height: 5' 5\" (1.651 m), Weight: 177 lb (80.3 kg), BP: 132/80    General: Alert and oriented x3, no acute distress  Cardiovascular/pulmonary: No labored breathing, peripheral perfusion intact  Musculoskeletal:    On exam of the right shoulder, range of motion is 160/45/T8. There is good strength with Marlon test.  Speed and [place]'s tests are intact. Belly press test is intact. Biceps contour is normal.    On exam of the left shoulder, she can elevate about 150 degrees. There is pain with Marlon test as well as weakness. Belly press test is intact      Imaging: No new imaging. Previous images reviewed including left shoulder MRI showing full-thickness tear without retraction of the supraspinatus, also with some abnormality of the upper subscap and the biceps as it exits the groove    Assessment and Plan: Status post right shoulder rotator cuff repair, biceps tenodesis about 5 months ago. Now with progression of left shoulder pain, with known rotator cuff tear and biceps subluxation  We discussed her shoulders today. She is doing well in terms of her right shoulder. She has had progressive symptoms on the left side. She is a candidate for left shoulder rotator cuff repair if she would like to proceed. Her MRI was from 1 year ago. I do not feel we need to repeat this, although we did discuss the potential that her tear has increased in size. She understands this.   She will think it over regarding surgery. She will call us if she would like to proceed.   Surgery would involve left shoulder arthroscopy, rotator cuff repair, biceps tenodesis    Estella Wakefield MD  Orthopaedic Surgery   2/28/20  9:16 AM

## 2020-03-23 ENCOUNTER — TELEPHONE (OUTPATIENT)
Dept: ORTHOPEDIC SURGERY | Age: 70
End: 2020-03-23

## 2020-03-23 NOTE — TELEPHONE ENCOUNTER
Spoke w pt at great length, pt notified of new surgery date 06/11/2020. Pt verbalized understanding and she is agreeable to this plan.

## 2020-05-05 ENCOUNTER — TELEPHONE (OUTPATIENT)
Dept: PRIMARY CARE CLINIC | Age: 70
End: 2020-05-05

## 2020-05-05 NOTE — TELEPHONE ENCOUNTER
Patient is rescheduled for surgery on 5/21 with Dr. Carey Ball. Patient would like to know if you need to see her for surgical clearance. Please advise.

## 2020-05-07 ENCOUNTER — TELEPHONE (OUTPATIENT)
Dept: ORTHOPEDIC SURGERY | Age: 70
End: 2020-05-07

## 2020-05-07 NOTE — TELEPHONE ENCOUNTER
Pt notified of all appt dates,times, and locations. All questions and concerns addressed. Pt given detailed instructions for protocol. Pt verbalized understanding and she is agreeable to this plan.

## 2020-05-08 ENCOUNTER — VIRTUAL VISIT (OUTPATIENT)
Dept: FAMILY MEDICINE CLINIC | Age: 70
End: 2020-05-08
Payer: MEDICARE

## 2020-05-08 PROCEDURE — 99213 OFFICE O/P EST LOW 20 MIN: CPT | Performed by: INTERNAL MEDICINE

## 2020-05-16 ENCOUNTER — HOSPITAL ENCOUNTER (OUTPATIENT)
Age: 70
Discharge: HOME | End: 2020-05-18
Payer: MEDICARE

## 2020-05-16 ENCOUNTER — HOSPITAL ENCOUNTER (OUTPATIENT)
Age: 70
Discharge: HOME | End: 2020-05-16
Payer: MEDICARE

## 2020-05-16 LAB
ALBUMIN SERPL-MCNC: 4.2 G/DL (ref 3.5–5.2)
ALP BLD-CCNC: 52 U/L (ref 35–104)
ALT SERPL-CCNC: 26 U/L (ref 0–32)
ANION GAP SERPL CALCULATED.3IONS-SCNC: 13 MMOL/L (ref 7–16)
AST SERPL-CCNC: 25 U/L (ref 0–31)
BILIRUB SERPL-MCNC: 0.4 MG/DL (ref 0–1.2)
BUN BLDV-MCNC: 20 MG/DL (ref 8–23)
CALCIUM SERPL-MCNC: 9.3 MG/DL (ref 8.6–10.2)
CHLORIDE BLD-SCNC: 103 MMOL/L (ref 98–107)
CHOLESTEROL, TOTAL: 278 MG/DL (ref 0–199)
CO2: 24 MMOL/L (ref 22–29)
CREAT SERPL-MCNC: 0.7 MG/DL (ref 0.5–1)
GFR AFRICAN AMERICAN: >60
GFR NON-AFRICAN AMERICAN: >60 ML/MIN/1.73
GLUCOSE BLD-MCNC: 95 MG/DL (ref 74–99)
HCT VFR BLD CALC: 39.6 % (ref 34–48)
HDLC SERPL-MCNC: 44 MG/DL
HEMOGLOBIN: 12.8 G/DL (ref 11.5–15.5)
LDL CHOLESTEROL CALCULATED: 163 MG/DL (ref 0–99)
MCH RBC QN AUTO: 30 PG (ref 26–35)
MCHC RBC AUTO-ENTMCNC: 32.3 % (ref 32–34.5)
MCV RBC AUTO: 92.7 FL (ref 80–99.9)
PDW BLD-RTO: 13.4 FL (ref 11.5–15)
PLATELET # BLD: 232 E9/L (ref 130–450)
PMV BLD AUTO: 10 FL (ref 7–12)
POTASSIUM SERPL-SCNC: 4.4 MMOL/L (ref 3.5–5)
RBC # BLD: 4.27 E12/L (ref 3.5–5.5)
SODIUM BLD-SCNC: 140 MMOL/L (ref 132–146)
TOTAL PROTEIN: 7.2 G/DL (ref 6.4–8.3)
TRIGL SERPL-MCNC: 356 MG/DL (ref 0–149)
VLDLC SERPL CALC-MCNC: 71 MG/DL
WBC # BLD: 6 E9/L (ref 4.5–11.5)

## 2020-05-16 PROCEDURE — U0003 INFECTIOUS AGENT DETECTION BY NUCLEIC ACID (DNA OR RNA); SEVERE ACUTE RESPIRATORY SYNDROME CORONAVIRUS 2 (SARS-COV-2) (CORONAVIRUS DISEASE [COVID-19]), AMPLIFIED PROBE TECHNIQUE, MAKING USE OF HIGH THROUGHPUT TECHNOLOGIES AS DESCRIBED BY CMS-2020-01-R: HCPCS

## 2020-05-16 PROCEDURE — 80061 LIPID PANEL: CPT

## 2020-05-16 PROCEDURE — 85027 COMPLETE CBC AUTOMATED: CPT

## 2020-05-16 PROCEDURE — 80053 COMPREHEN METABOLIC PANEL: CPT

## 2020-05-16 PROCEDURE — 36415 COLL VENOUS BLD VENIPUNCTURE: CPT

## 2020-05-18 ENCOUNTER — TELEPHONE (OUTPATIENT)
Dept: ORTHOPEDIC SURGERY | Age: 70
End: 2020-05-18

## 2020-05-18 ENCOUNTER — OFFICE VISIT (OUTPATIENT)
Dept: ORTHOPEDIC SURGERY | Age: 70
End: 2020-05-18

## 2020-05-18 VITALS — BODY MASS INDEX: 29.99 KG/M2 | TEMPERATURE: 96.4 F | HEIGHT: 65 IN | WEIGHT: 180 LBS

## 2020-05-18 LAB
SARS-COV-2: NOT DETECTED
SOURCE: NORMAL

## 2020-05-18 PROCEDURE — PREOPEXAM PRE-OP EXAM: Performed by: ORTHOPAEDIC SURGERY

## 2020-05-18 RX ORDER — EVOLOCUMAB 140 MG/ML
140 INJECTION, SOLUTION SUBCUTANEOUS
Qty: 2.1 ML | Refills: 0 | Status: SHIPPED
Start: 2020-05-18 | End: 2020-06-25 | Stop reason: SDUPTHER

## 2020-05-18 RX ORDER — ACETAMINOPHEN 325 MG/1
650 TABLET ORAL EVERY 6 HOURS PRN
COMMUNITY
End: 2020-06-19

## 2020-05-18 RX ORDER — HYDROCODONE BITARTRATE AND ACETAMINOPHEN 5; 325 MG/1; MG/1
1 TABLET ORAL EVERY 6 HOURS PRN
Qty: 20 TABLET | Refills: 0 | Status: SHIPPED
Start: 2020-05-18 | End: 2020-05-19

## 2020-05-18 RX ORDER — KETOROLAC TROMETHAMINE 10 MG/1
10 TABLET, FILM COATED ORAL EVERY 6 HOURS PRN
Qty: 8 TABLET | Refills: 0 | Status: SHIPPED
Start: 2020-05-18 | End: 2020-05-19

## 2020-05-18 NOTE — PROGRESS NOTES
05/16/2020     05/16/2020    MPV 10.0 05/16/2020     BMP:    Lab Results   Component Value Date     05/16/2020    K 4.4 05/16/2020     05/16/2020    CO2 24 05/16/2020    BUN 20 05/16/2020    LABALBU 4.2 05/16/2020    LABALBU 4.5 05/29/2012    CREATININE 0.7 05/16/2020    CALCIUM 9.3 05/16/2020    GFRAA >60 05/16/2020    LABGLOM >60 05/16/2020    GLUCOSE 95 05/16/2020    GLUCOSE 83 05/29/2012       Radiology Review: MRI left shoulder shows high-grade partial tear of the supraspinatus. There is abnormality the biceps tendon with probable subluxation. There may be partial tearing the upper subscapularis    ASSESSMENT AND PLAN:    1. Patient is a 78 y.o. female with above specified procedure planned left shoulder arthroscopy rotator cuff repair biceps tenodesis for Thursday, May 21, 2020 with deep sedation    2. Procedure options, risks and benefits reviewed with patient. Patient expresses understanding and has signed consent form to proceed. 3.  Patient and family were provided with pre-op and post-op instructions, prescription medications, and any other supplied needed post op (slings, braces, etc.)    Controlled Substances Monitoring:        Antelmo Baca CNP  Orthopedic Surgery   05/18/20  2:57 PM      Staff Addendum    I have seen and evaluated the patient and agree with the assessment and plan as documented by Antelmo Baca CNP. I have performed the key components of the history and physical examination and concur with the findings and plan, and have made changes where appropriate/necessary. Agree with above. She did well with surgery on the right shoulder. She is now interested in proceeding on the left. Risks and rehab protocol were discussed today.   She understands and would like to proceed      Karen Daly MD  [address]

## 2020-05-18 NOTE — TELEPHONE ENCOUNTER
Spoke w Kj Dodson at [facility], he advised pre Halie Brower is required. auth obtained. auth # [id number].

## 2020-05-19 NOTE — PROGRESS NOTES
[facility] PRE-ADMISSION TESTING INSTRUCTIONS    The Preadmission Testing patient is instructed accordingly using the following criteria (check applicable):    ARRIVAL INSTRUCTIONS:  [x] Parking the day of Surgery is located in the Main Entrance lot. Upon entering the door, make an immediate right to the surgery reception desk    [] Bring photo ID and insurance card    [] Bring in a copy of Living will or Durable Power of  papers. [x] Please be sure to arrange transportation to and from the hospital    [x] Please arrange for someone to be with you the remainder of the day due to having anesthesia      GENERAL INSTRUCTIONS:    [x] Nothing by mouth after midnight, including gum, candy, mints or water    [x] You may brush your teeth, but do not swallow any water    [x] Take medications as instructed with 1-2 oz of water    [x] Stop herbal supplements and vitamins 5 days prior to procedure    [x] Follow preop dosing of blood thinners per physician instructions    [] Do not take insulin or oral diabetic medications    [] If diabetic and have low blood sugar or feel symptomatic, take 1-2oz apple juice or glucose tablets    [] Bring inhalers day of surgery    [] Bring C-PAP/ Bi-Pap day of surgery    [] Bring urine specimen day of surgery    [x] Antibacterial Soap shower or bath AM of Surgery, no lotion, powders or creams to surgical site    [] Follow bowel prep as instructed per surgeon    [] No tobacco products within 24 hours of surgery     [] No alcohol or illegal drug use within 24 hours of surgery.     [x] Jewelry, body piercing's, eyeglasses, contact lenses and dentures are not permitted into surgery (bring cases)      [x] Please do not wear any nail polish or make up on the day of surgery    [x] If not already done, you can expect a call from registration    [x] If surgeon requests a time change you will be notified the day prior to surgery    [x] If you receive a survey after

## 2020-05-21 ENCOUNTER — ANESTHESIA EVENT (OUTPATIENT)
Dept: OPERATING ROOM | Age: 70
End: 2020-05-21
Payer: MEDICARE

## 2020-05-21 ENCOUNTER — ANESTHESIA (OUTPATIENT)
Dept: OPERATING ROOM | Age: 70
End: 2020-05-21
Payer: MEDICARE

## 2020-05-21 ENCOUNTER — HOSPITAL ENCOUNTER (OUTPATIENT)
Age: 70
Setting detail: OUTPATIENT SURGERY
Discharge: HOME | End: 2020-05-21
Attending: ORTHOPAEDIC SURGERY | Admitting: ORTHOPAEDIC SURGERY
Payer: MEDICARE

## 2020-05-21 VITALS
RESPIRATION RATE: 20 BRPM | DIASTOLIC BLOOD PRESSURE: 78 MMHG | BODY MASS INDEX: 29.99 KG/M2 | SYSTOLIC BLOOD PRESSURE: 157 MMHG | OXYGEN SATURATION: 97 % | HEART RATE: 67 BPM | HEIGHT: 65 IN | WEIGHT: 180 LBS | TEMPERATURE: 97 F

## 2020-05-21 VITALS
SYSTOLIC BLOOD PRESSURE: 164 MMHG | DIASTOLIC BLOOD PRESSURE: 80 MMHG | RESPIRATION RATE: 4 BRPM | OXYGEN SATURATION: 99 % | TEMPERATURE: 95.2 F

## 2020-05-21 LAB
EKG ATRIAL RATE: 61 BPM
EKG P AXIS: 43 DEGREES
EKG P-R INTERVAL: 168 MS
EKG Q-T INTERVAL: 442 MS
EKG QRS DURATION: 90 MS
EKG QTC CALCULATION (BAZETT): 444 MS
EKG R AXIS: 47 DEGREES
EKG T AXIS: 39 DEGREES
EKG VENTRICULAR RATE: 61 BPM

## 2020-05-21 PROCEDURE — 6370000000 HC RX 637 (ALT 250 FOR IP): Performed by: ANESTHESIOLOGY

## 2020-05-21 PROCEDURE — 93010 ELECTROCARDIOGRAM REPORT: CPT | Performed by: INTERNAL MEDICINE

## 2020-05-21 PROCEDURE — C1713 ANCHOR/SCREW BN/BN,TIS/BN: HCPCS | Performed by: ORTHOPAEDIC SURGERY

## 2020-05-21 PROCEDURE — 2709999900 HC NON-CHARGEABLE SUPPLY: Performed by: ORTHOPAEDIC SURGERY

## 2020-05-21 PROCEDURE — L3650 SO 8 ABD RESTRAINT PRE OTS: HCPCS | Performed by: ORTHOPAEDIC SURGERY

## 2020-05-21 PROCEDURE — 29827 SHO ARTHRS SRG RT8TR CUF RPR: CPT | Performed by: ORTHOPAEDIC SURGERY

## 2020-05-21 PROCEDURE — 2500000003 HC RX 250 WO HCPCS: Performed by: REGISTERED NURSE

## 2020-05-21 PROCEDURE — 3600000004 HC SURGERY LEVEL 4 BASE: Performed by: ORTHOPAEDIC SURGERY

## 2020-05-21 PROCEDURE — 2720000010 HC SURG SUPPLY STERILE: Performed by: ORTHOPAEDIC SURGERY

## 2020-05-21 PROCEDURE — 7100000001 HC PACU RECOVERY - ADDTL 15 MIN: Performed by: ORTHOPAEDIC SURGERY

## 2020-05-21 PROCEDURE — 64415 NJX AA&/STRD BRCH PLXS IMG: CPT | Performed by: ANESTHESIOLOGY

## 2020-05-21 PROCEDURE — 93005 ELECTROCARDIOGRAM TRACING: CPT | Performed by: NURSE PRACTITIONER

## 2020-05-21 PROCEDURE — 6370000000 HC RX 637 (ALT 250 FOR IP)

## 2020-05-21 PROCEDURE — 7100000000 HC PACU RECOVERY - FIRST 15 MIN: Performed by: ORTHOPAEDIC SURGERY

## 2020-05-21 PROCEDURE — 2580000003 HC RX 258: Performed by: REGISTERED NURSE

## 2020-05-21 PROCEDURE — 29828 SHO ARTHRS SRG BICP TENODSIS: CPT | Performed by: ORTHOPAEDIC SURGERY

## 2020-05-21 PROCEDURE — 7100000011 HC PHASE II RECOVERY - ADDTL 15 MIN: Performed by: ORTHOPAEDIC SURGERY

## 2020-05-21 PROCEDURE — 29826 SHO ARTHRS SRG DECOMPRESSION: CPT | Performed by: NURSE PRACTITIONER

## 2020-05-21 PROCEDURE — 6360000002 HC RX W HCPCS: Performed by: ORTHOPAEDIC SURGERY

## 2020-05-21 PROCEDURE — 2500000003 HC RX 250 WO HCPCS: Performed by: ORTHOPAEDIC SURGERY

## 2020-05-21 PROCEDURE — 29826 SHO ARTHRS SRG DECOMPRESSION: CPT | Performed by: ORTHOPAEDIC SURGERY

## 2020-05-21 PROCEDURE — 29827 SHO ARTHRS SRG RT8TR CUF RPR: CPT | Performed by: NURSE PRACTITIONER

## 2020-05-21 PROCEDURE — 6360000002 HC RX W HCPCS

## 2020-05-21 PROCEDURE — 3700000001 HC ADD 15 MINUTES (ANESTHESIA): Performed by: ORTHOPAEDIC SURGERY

## 2020-05-21 PROCEDURE — 3700000000 HC ANESTHESIA ATTENDED CARE: Performed by: ORTHOPAEDIC SURGERY

## 2020-05-21 PROCEDURE — 3600000014 HC SURGERY LEVEL 4 ADDTL 15MIN: Performed by: ORTHOPAEDIC SURGERY

## 2020-05-21 PROCEDURE — 6360000002 HC RX W HCPCS: Performed by: REGISTERED NURSE

## 2020-05-21 PROCEDURE — 29828 SHO ARTHRS SRG BICP TENODSIS: CPT | Performed by: NURSE PRACTITIONER

## 2020-05-21 PROCEDURE — 6360000002 HC RX W HCPCS: Performed by: NURSE PRACTITIONER

## 2020-05-21 PROCEDURE — 7100000010 HC PHASE II RECOVERY - FIRST 15 MIN: Performed by: ORTHOPAEDIC SURGERY

## 2020-05-21 DEVICE — ANCHOR SUTURE BIOCOMP 4.75X22 MM DBL LD WHT SWIVELOCK C: Type: IMPLANTABLE DEVICE | Site: SHOULDER | Status: FUNCTIONAL

## 2020-05-21 RX ORDER — HYDROCODONE BITARTRATE AND ACETAMINOPHEN 5; 325 MG/1; MG/1
1 TABLET ORAL ONCE
Status: COMPLETED | OUTPATIENT
Start: 2020-05-21 | End: 2020-05-21

## 2020-05-21 RX ORDER — PROPOFOL 10 MG/ML
INJECTION, EMULSION INTRAVENOUS PRN
Status: DISCONTINUED | OUTPATIENT
Start: 2020-05-21 | End: 2020-05-21 | Stop reason: SDUPTHER

## 2020-05-21 RX ORDER — SCOLOPAMINE TRANSDERMAL SYSTEM 1 MG/1
1 PATCH, EXTENDED RELEASE TRANSDERMAL ONCE
Status: COMPLETED | OUTPATIENT
Start: 2020-05-21 | End: 2020-05-21

## 2020-05-21 RX ORDER — PHENYLEPHRINE HYDROCHLORIDE 10 MG/ML
INJECTION INTRAVENOUS PRN
Status: DISCONTINUED | OUTPATIENT
Start: 2020-05-21 | End: 2020-05-21 | Stop reason: SDUPTHER

## 2020-05-21 RX ORDER — GLYCOPYRROLATE 1 MG/5 ML
SYRINGE (ML) INTRAVENOUS PRN
Status: DISCONTINUED | OUTPATIENT
Start: 2020-05-21 | End: 2020-05-21 | Stop reason: SDUPTHER

## 2020-05-21 RX ORDER — FENTANYL CITRATE 50 UG/ML
50 INJECTION, SOLUTION INTRAMUSCULAR; INTRAVENOUS ONCE
Status: COMPLETED | OUTPATIENT
Start: 2020-05-21 | End: 2020-05-21

## 2020-05-21 RX ORDER — ROPIVACAINE HYDROCHLORIDE 5 MG/ML
INJECTION, SOLUTION EPIDURAL; INFILTRATION; PERINEURAL
Status: COMPLETED
Start: 2020-05-21 | End: 2020-05-21

## 2020-05-21 RX ORDER — LIDOCAINE HYDROCHLORIDE 20 MG/ML
INJECTION, SOLUTION EPIDURAL; INFILTRATION; INTRACAUDAL; PERINEURAL PRN
Status: DISCONTINUED | OUTPATIENT
Start: 2020-05-21 | End: 2020-05-21 | Stop reason: SDUPTHER

## 2020-05-21 RX ORDER — ROCURONIUM BROMIDE 10 MG/ML
INJECTION, SOLUTION INTRAVENOUS PRN
Status: DISCONTINUED | OUTPATIENT
Start: 2020-05-21 | End: 2020-05-21 | Stop reason: SDUPTHER

## 2020-05-21 RX ORDER — FENTANYL CITRATE 50 UG/ML
INJECTION, SOLUTION INTRAMUSCULAR; INTRAVENOUS
Status: COMPLETED
Start: 2020-05-21 | End: 2020-05-21

## 2020-05-21 RX ORDER — MIDAZOLAM HYDROCHLORIDE 1 MG/ML
INJECTION INTRAMUSCULAR; INTRAVENOUS
Status: COMPLETED
Start: 2020-05-21 | End: 2020-05-21

## 2020-05-21 RX ORDER — EPINEPHRINE 1 MG/ML
INJECTION, SOLUTION, CONCENTRATE INTRAVENOUS PRN
Status: DISCONTINUED | OUTPATIENT
Start: 2020-05-21 | End: 2020-05-21 | Stop reason: ALTCHOICE

## 2020-05-21 RX ORDER — PROMETHAZINE HYDROCHLORIDE 25 MG/ML
6.25 INJECTION, SOLUTION INTRAMUSCULAR; INTRAVENOUS
Status: DISCONTINUED | OUTPATIENT
Start: 2020-05-21 | End: 2020-05-21 | Stop reason: HOSPADM

## 2020-05-21 RX ORDER — SODIUM CHLORIDE 0.9 % (FLUSH) 0.9 %
10 SYRINGE (ML) INJECTION PRN
Status: DISCONTINUED | OUTPATIENT
Start: 2020-05-21 | End: 2020-05-21 | Stop reason: HOSPADM

## 2020-05-21 RX ORDER — NEOSTIGMINE METHYLSULFATE 1 MG/ML
INJECTION, SOLUTION INTRAVENOUS PRN
Status: DISCONTINUED | OUTPATIENT
Start: 2020-05-21 | End: 2020-05-21 | Stop reason: SDUPTHER

## 2020-05-21 RX ORDER — SODIUM CHLORIDE 9 MG/ML
INJECTION, SOLUTION INTRAVENOUS CONTINUOUS PRN
Status: DISCONTINUED | OUTPATIENT
Start: 2020-05-21 | End: 2020-05-21 | Stop reason: SDUPTHER

## 2020-05-21 RX ORDER — ONDANSETRON 2 MG/ML
INJECTION INTRAMUSCULAR; INTRAVENOUS PRN
Status: DISCONTINUED | OUTPATIENT
Start: 2020-05-21 | End: 2020-05-21 | Stop reason: SDUPTHER

## 2020-05-21 RX ORDER — MIDAZOLAM HYDROCHLORIDE 1 MG/ML
1 INJECTION INTRAMUSCULAR; INTRAVENOUS ONCE
Status: COMPLETED | OUTPATIENT
Start: 2020-05-21 | End: 2020-05-21

## 2020-05-21 RX ORDER — SODIUM CHLORIDE 0.9 % (FLUSH) 0.9 %
10 SYRINGE (ML) INJECTION EVERY 12 HOURS SCHEDULED
Status: DISCONTINUED | OUTPATIENT
Start: 2020-05-21 | End: 2020-05-21 | Stop reason: HOSPADM

## 2020-05-21 RX ORDER — DEXAMETHASONE SODIUM PHOSPHATE 4 MG/ML
INJECTION, SOLUTION INTRA-ARTICULAR; INTRALESIONAL; INTRAMUSCULAR; INTRAVENOUS; SOFT TISSUE PRN
Status: DISCONTINUED | OUTPATIENT
Start: 2020-05-21 | End: 2020-05-21 | Stop reason: SDUPTHER

## 2020-05-21 RX ORDER — SCOLOPAMINE TRANSDERMAL SYSTEM 1 MG/1
1 PATCH, EXTENDED RELEASE TRANSDERMAL
Status: DISCONTINUED | OUTPATIENT
Start: 2020-05-24 | End: 2020-05-21 | Stop reason: HOSPADM

## 2020-05-21 RX ORDER — SCOLOPAMINE TRANSDERMAL SYSTEM 1 MG/1
PATCH, EXTENDED RELEASE TRANSDERMAL
Status: COMPLETED
Start: 2020-05-21 | End: 2020-05-21

## 2020-05-21 RX ORDER — CEFAZOLIN SODIUM 2 G/50ML
2 SOLUTION INTRAVENOUS
Status: COMPLETED | OUTPATIENT
Start: 2020-05-21 | End: 2020-05-21

## 2020-05-21 RX ORDER — ROPIVACAINE HYDROCHLORIDE 5 MG/ML
30 INJECTION, SOLUTION EPIDURAL; INFILTRATION; PERINEURAL ONCE
Status: COMPLETED | OUTPATIENT
Start: 2020-05-21 | End: 2020-05-21

## 2020-05-21 RX ADMIN — SODIUM CHLORIDE: 9 INJECTION, SOLUTION INTRAVENOUS at 07:00

## 2020-05-21 RX ADMIN — PHENYLEPHRINE HYDROCHLORIDE 25 MCG: 10 INJECTION INTRAVENOUS at 07:37

## 2020-05-21 RX ADMIN — PHENYLEPHRINE HYDROCHLORIDE 25 MCG: 10 INJECTION INTRAVENOUS at 07:49

## 2020-05-21 RX ADMIN — ROCURONIUM BROMIDE 50 MG: 10 INJECTION, SOLUTION INTRAVENOUS at 07:07

## 2020-05-21 RX ADMIN — MIDAZOLAM HYDROCHLORIDE 1 MG: 1 INJECTION, SOLUTION INTRAMUSCULAR; INTRAVENOUS at 06:34

## 2020-05-21 RX ADMIN — MIDAZOLAM HYDROCHLORIDE 1 MG: 1 INJECTION INTRAMUSCULAR; INTRAVENOUS at 06:34

## 2020-05-21 RX ADMIN — FENTANYL CITRATE 50 MCG: 50 INJECTION, SOLUTION INTRAMUSCULAR; INTRAVENOUS at 06:34

## 2020-05-21 RX ADMIN — PROPOFOL 180 MG: 10 INJECTION, EMULSION INTRAVENOUS at 07:07

## 2020-05-21 RX ADMIN — ROPIVACAINE HYDROCHLORIDE 30 ML: 5 INJECTION, SOLUTION EPIDURAL; INFILTRATION; PERINEURAL at 06:46

## 2020-05-21 RX ADMIN — DEXAMETHASONE SODIUM PHOSPHATE 10 MG: 4 INJECTION, SOLUTION INTRAMUSCULAR; INTRAVENOUS at 07:10

## 2020-05-21 RX ADMIN — Medication 3 MG: at 08:44

## 2020-05-21 RX ADMIN — LIDOCAINE HYDROCHLORIDE 100 MG: 20 INJECTION, SOLUTION EPIDURAL; INFILTRATION; INTRACAUDAL; PERINEURAL at 07:07

## 2020-05-21 RX ADMIN — HYDROCODONE BITARTRATE AND ACETAMINOPHEN 1 TABLET: 5; 325 TABLET ORAL at 10:06

## 2020-05-21 RX ADMIN — Medication 0.6 MG: at 08:44

## 2020-05-21 RX ADMIN — CEFAZOLIN SODIUM 2 G: 2 SOLUTION INTRAVENOUS at 07:01

## 2020-05-21 RX ADMIN — ONDANSETRON HYDROCHLORIDE 4 MG: 2 INJECTION, SOLUTION INTRAMUSCULAR; INTRAVENOUS at 08:38

## 2020-05-21 ASSESSMENT — PAIN SCALES - GENERAL
PAINLEVEL_OUTOF10: 0
PAINLEVEL_OUTOF10: 7
PAINLEVEL_OUTOF10: 0

## 2020-05-21 ASSESSMENT — PULMONARY FUNCTION TESTS
PIF_VALUE: 18
PIF_VALUE: 19
PIF_VALUE: 13
PIF_VALUE: 19
PIF_VALUE: 18
PIF_VALUE: 24
PIF_VALUE: 23
PIF_VALUE: 19
PIF_VALUE: 4
PIF_VALUE: 18
PIF_VALUE: 19
PIF_VALUE: 19
PIF_VALUE: 13
PIF_VALUE: 18
PIF_VALUE: 19
PIF_VALUE: 14
PIF_VALUE: 19
PIF_VALUE: 19
PIF_VALUE: 22
PIF_VALUE: 19
PIF_VALUE: 19
PIF_VALUE: 18
PIF_VALUE: 18
PIF_VALUE: 19
PIF_VALUE: 17
PIF_VALUE: 18
PIF_VALUE: 19
PIF_VALUE: 1
PIF_VALUE: 1
PIF_VALUE: 18
PIF_VALUE: 19
PIF_VALUE: 13
PIF_VALUE: 18
PIF_VALUE: 18
PIF_VALUE: 20
PIF_VALUE: 18
PIF_VALUE: 19
PIF_VALUE: 19
PIF_VALUE: 18
PIF_VALUE: 37
PIF_VALUE: 18
PIF_VALUE: 18
PIF_VALUE: 3
PIF_VALUE: 18
PIF_VALUE: 19
PIF_VALUE: 2
PIF_VALUE: 19
PIF_VALUE: 9
PIF_VALUE: 19
PIF_VALUE: 18
PIF_VALUE: 19
PIF_VALUE: 13
PIF_VALUE: 18
PIF_VALUE: 19
PIF_VALUE: 19
PIF_VALUE: 1
PIF_VALUE: 1
PIF_VALUE: 19
PIF_VALUE: 18
PIF_VALUE: 1
PIF_VALUE: 19
PIF_VALUE: 19
PIF_VALUE: 18
PIF_VALUE: 1
PIF_VALUE: 19
PIF_VALUE: 18
PIF_VALUE: 19
PIF_VALUE: 19
PIF_VALUE: 17
PIF_VALUE: 29
PIF_VALUE: 19
PIF_VALUE: 19
PIF_VALUE: 18
PIF_VALUE: 19
PIF_VALUE: 1
PIF_VALUE: 13
PIF_VALUE: 18
PIF_VALUE: 21
PIF_VALUE: 2
PIF_VALUE: 13
PIF_VALUE: 19
PIF_VALUE: 19
PIF_VALUE: 20
PIF_VALUE: 13
PIF_VALUE: 18
PIF_VALUE: 0
PIF_VALUE: 19
PIF_VALUE: 18
PIF_VALUE: 17
PIF_VALUE: 3
PIF_VALUE: 19
PIF_VALUE: 15
PIF_VALUE: 3
PIF_VALUE: 18
PIF_VALUE: 19
PIF_VALUE: 19
PIF_VALUE: 18
PIF_VALUE: 19
PIF_VALUE: 18
PIF_VALUE: 18
PIF_VALUE: 19
PIF_VALUE: 2
PIF_VALUE: 3
PIF_VALUE: 18
PIF_VALUE: 17
PIF_VALUE: 19
PIF_VALUE: 18
PIF_VALUE: 15
PIF_VALUE: 19
PIF_VALUE: 2

## 2020-05-21 ASSESSMENT — PAIN DESCRIPTION - DESCRIPTORS: DESCRIPTORS: ACHING;DISCOMFORT

## 2020-05-21 ASSESSMENT — PAIN - FUNCTIONAL ASSESSMENT: PAIN_FUNCTIONAL_ASSESSMENT: 0-10

## 2020-05-21 NOTE — ANESTHESIA PRE PROCEDURE
BP Readings from Last 3 Encounters:   05/21/20 (!) 145/97   02/28/20 132/80   01/17/20 108/72       NPO Status: Time of last liquid consumption: 2200                        Time of last solid consumption: 2000                        Date of last liquid consumption: 05/20/20                        Date of last solid food consumption: 05/20/20    BMI:   Wt Readings from Last 3 Encounters:   05/21/20 180 lb (81.6 kg)   05/18/20 180 lb (81.6 kg)   02/28/20 177 lb (80.3 kg)     Body mass index is 29.95 kg/m². CBC:   Lab Results   Component Value Date    WBC 6.0 05/16/2020    RBC 4.27 05/16/2020    HGB 12.8 05/16/2020    HCT 39.6 05/16/2020    MCV 92.7 05/16/2020    RDW 13.4 05/16/2020     05/16/2020       CMP:   Lab Results   Component Value Date     05/16/2020    K 4.4 05/16/2020     05/16/2020    CO2 24 05/16/2020    BUN 20 05/16/2020    CREATININE 0.7 05/16/2020    GFRAA >60 05/16/2020    LABGLOM >60 05/16/2020    GLUCOSE 95 05/16/2020    GLUCOSE 83 05/29/2012    PROT 7.2 05/16/2020    CALCIUM 9.3 05/16/2020    BILITOT 0.4 05/16/2020    ALKPHOS 52 05/16/2020    AST 25 05/16/2020    ALT 26 05/16/2020       POC Tests: No results for input(s): POCGLU, POCNA, POCK, POCCL, POCBUN, POCHEMO, POCHCT in the last 72 hours.     Coags: No results found for: PROTIME, INR, APTT    HCG (If Applicable): No results found for: PREGTESTUR, PREGSERUM, HCG, HCGQUANT     ABGs: No results found for: PHART, PO2ART, ITQ1DAJ, YYC8SWF, BEART, D5JRRXNI     Type & Screen (If Applicable):  No results found for: LABABO, LABRH    Drug/Infectious Status (If Applicable):  No results found for: HIV, HEPCAB    COVID-19 Screening (If Applicable):   Lab Results   Component Value Date    COVID19 Not Detected 05/16/2020     EKG 5/21/2020  Narrative & Impression     Sinus rhythm with marked sinus arrhythmia  Otherwise normal ECG  When compared with ECG of 20-FEB-2018 18:02,  Borderline criteria for Inferior infarct are no longer

## 2020-05-21 NOTE — PROGRESS NOTES
Patient admitted to PACU- drowsy and following commands  Patient able to move left hand fingers, with numbness and no pain    Elie System

## 2020-05-21 NOTE — OP NOTE
OPERATIVE REPORT    PATIENT:  Jean Ly  [id number]    DATE OF PROCEDURE:  5/21/20    SURGEON: Linda Swenson MD    ASSISTANT:  Slim Bains CNP, no qualified resident to assist.  CNP assisted with positioning, draping, intra-operative retraction, and wound closure. PREOPERATIVE DIAGNOSIS: Rotator cuff tear, biceps subluxation  Left shoulder. POSTOPERATIVE DIAGNOSIS: Rotator cuff tear including subscapularis, Type II SLAP Tear Left shoulder. OPERATION:  Left shoulder arthroscopy, rotator cuff repair (subscapularis single anchor), biceps tenodesis, subacromial decompression with acromioplasty     ANESTHESIA: . general and interscalene block    OPERATIVE INDICATIONS:  The patient is a 78 y.o. female with significant past medical history of chronic left shoulder pain. She has failed conservative treatment which has included therapy, home strengthening exercises and steroid injections. She reports weakness and pain when trying to do any activity away from the body. She has an MRI that has shown abnormality the biceps tendon as well as high-grade partial tearing of the very anterior aspect of the supraspinatus. Given her failure of conservative treatment she is interested in surgery. Discussed surgery would involve left shoulder arthroscopy, rotator cuff repair versus debridement, possible biceps tenodesis. Risks regarding surgery were discussed including but not limited to infection, neurovascular injury, persistent pain, possible re-tearing of the rotator cuff, and possible need for additional surgery, unforseen risks. The recovery was discussed with patient today.   She verbalizes understanding she wishes to proceed with scheduled case for Thursday.     The patient was counseled at length about the risks of jd Covid-19 during their perioperative period and any recovery window from their procedure.  The patient was made aware that jd Covid-19  may worsen their prognosis for consistency of the tendon with palpation and it was stable to range of motion. We did not feel there was any need for intervention with the supraspinatus. The camera was returned to the joint through the posterior portal.  Final pictures were taken. The scope was withdrawn and fluid removed via suction. The wounds were closed with buried 4-O moncryl. The wounds were covered with steri strips, xeroform, 4x4, and tape. The shoulder was placed in a sling. The patient was awoken from anesthesia and transferred to the recovery room in stable condition. IMPLANTS:   Implant Name Type Inv. Item Serial No.  Lot No. LRB No. Used   SWIVELOCK W/2SUTURE 4.75X22 BIOCOMPOSITE Fastener SWIVELOCK W/2SUTURE 4.75X22 BIOCOMPOSITE  [employer] [id number] Left 2         ESTIMATED BLOOD LOSS:  5 mL    COMPLICATIONS: none    POST OPERATIVE PLAN: The patient will discharged home from PACU once stable. Follow up in office will be post operative day 1 or 2. Dressing will stay on and ice applied until follow up. The patient will remain in the sling.         Dorota Barajas MD  Orthopaedic Surgery   5/21/20  8:45 AM

## 2020-05-21 NOTE — H&P
family history. REVIEW OF SYSTEMS:  CONSTITUTIONAL:  negative  RESPIRATORY:  negative  CARDIOVASCULAR:  negative  MUSCULOSKELETAL:  negative except for  HPI  NEUROLOGICAL:  negative    PHYSICAL EXAM:     VITALS:  BP (!) 145/97   Pulse 58   Temp 97 °F (36.1 °C) (Temporal)   Resp 20   Ht 5' 5\" (1.651 m)   Wt 180 lb (81.6 kg)   SpO2 97%   BMI 29.95 kg/m²     Gen: AOx3, NAD    Heart:  normal apical pulses, normal S1 and S2 and no edema    Lungs:  No increased work of breathing, good air exchange     Abdomen:  no scars, non-distended and non-tender    Extremities:  No clubbing, cyanosis, or edema    Musculoskeletal:   Left shoulder exam range of motion intact 150/50/L5. There is pain reaching behind the back. Marlon test is positive for pain and mild weakness. Speed and Ashland's test are both positive for pain. Biceps tendon. There is no tenderness over the [facility] joint    DATA:  CBC:   Lab Results   Component Value Date    WBC 6.0 05/16/2020    RBC 4.27 05/16/2020    HGB 12.8 05/16/2020    HCT 39.6 05/16/2020    MCV 92.7 05/16/2020    MCH 30.0 05/16/2020    MCHC 32.3 05/16/2020    RDW 13.4 05/16/2020     05/16/2020    MPV 10.0 05/16/2020     BMP:    Lab Results   Component Value Date     05/16/2020    K 4.4 05/16/2020     05/16/2020    CO2 24 05/16/2020    BUN 20 05/16/2020    LABALBU 4.2 05/16/2020    LABALBU 4.5 05/29/2012    CREATININE 0.7 05/16/2020    CALCIUM 9.3 05/16/2020    GFRAA >60 05/16/2020    LABGLOM >60 05/16/2020    GLUCOSE 95 05/16/2020    GLUCOSE 83 05/29/2012       Radiology Review: MRI left shoulder shows high-grade partial tear of the supraspinatus. There is abnormality the biceps tendon with probable subluxation. There may be partial tearing the upper subscapularis    ASSESSMENT AND PLAN:    1.   Patient is a 78 y.o. female with above specified procedure planned left shoulder arthroscopy rotator cuff repair possible biceps tenodesis for Thursday, May 21, 2020 with deep sedation    2. Procedure options, risks and benefits reviewed with patient. Patient expresses understanding and has signed consent form to proceed. 3.  Patient and family were provided with pre-op and post-op instructions, prescription medications, and any other supplied needed post op (slings, braces, etc.)    Controlled Substances Monitoring:        Bethel Torres0 [facility]  Orthopedic Surgery   05/21/20  6:42 AM      Staff Addendum    I have seen and evaluated the patient and agree with the assessment and plan as documented by Tammie Bowling CNP. I have performed the key components of the history and physical examination and concur with the findings and plan, and have made changes where appropriate/necessary. Agree with above. She did well with surgery on the right shoulder. She is now interested in proceeding on the left. Risks and rehab protocol were discussed today.   She understands and would like to proceed      Mary Gonzalez MD  [address] [place] [address]

## 2020-05-22 ENCOUNTER — OFFICE VISIT (OUTPATIENT)
Dept: ORTHOPEDIC SURGERY | Age: 70
End: 2020-05-22

## 2020-05-22 VITALS — TEMPERATURE: 97 F | WEIGHT: 180 LBS | BODY MASS INDEX: 29.99 KG/M2 | HEIGHT: 65 IN

## 2020-05-22 PROCEDURE — 99024 POSTOP FOLLOW-UP VISIT: CPT | Performed by: ORTHOPAEDIC SURGERY

## 2020-05-22 RX ORDER — KETOROLAC TROMETHAMINE 10 MG/1
10 TABLET, FILM COATED ORAL EVERY 6 HOURS PRN
COMMUNITY
End: 2020-06-19

## 2020-05-22 RX ORDER — HYDROCODONE BITARTRATE AND ACETAMINOPHEN 5; 325 MG/1; MG/1
1 TABLET ORAL EVERY 6 HOURS PRN
COMMUNITY
End: 2020-07-22 | Stop reason: ALTCHOICE

## 2020-05-22 NOTE — ANESTHESIA PROCEDURE NOTES
Peripheral Block    Patient location during procedure: procedure area  Start time: 5/21/2020 6:40 AM  End time: 5/21/2020 6:47 AM  Staffing  Anesthesiologist: Ilya Hair MD  Performed: anesthesiologist   Preanesthetic Checklist  Completed: patient identified, site marked, surgical consent, pre-op evaluation, timeout performed, IV checked, risks and benefits discussed, monitors and equipment checked, anesthesia consent given, oxygen available and patient being monitored  Peripheral Block  Patient position: supine  Prep: ChloraPrep  Patient monitoring: cardiac monitor, continuous pulse ox, continuous capnometry, frequent blood pressure checks and IV access  Block type: Brachial plexus  Laterality: left  Injection technique: single-shot  Procedures: ultrasound guided and nerve stimulator  Local infiltration: ropivacaine  Infiltration strength: 0.5 %  Dose: 30 mL  Interscalene  Provider prep: mask and sterile gloves  Local infiltration: ropivacaine  Needle  Needle type: combined needle/nerve stimulator   Needle gauge: 21 G  Needle length: 4 inch. Needle localization: nerve stimulator and ultrasound guidance  Assessment  Injection assessment: negative aspiration for heme, no paresthesia on injection and local visualized surrounding nerve on ultrasound  Paresthesia pain: none  Slow fractionated injection: yes  Hemodynamics: stable  Additional Notes  Patient tolerated procedure well.   VSS  Reason for block: post-op pain management and at surgeon's request

## 2020-06-18 NOTE — PROGRESS NOTES
Follow Up Post Operative Visit     Surgery: Left shoulder arthroscopy, rotator cuff repair (subscapularis single anchor), biceps tenodesis, subacromial decompression with acromioplasty   Date: 5/21/2020    Subjective:    Jewel Bills is here for follow up visit s/p above procedure. She is doing well. She has been coming out of sling for passive range of motion exercises directed. She has no complaints during today's visit. She has questions regarding the discontinuation of her sling today. Controlled Substances Monitoring:        Physical Exam:    Height: 5' 5\" (1.651 m), Weight: 175 lb (79.4 kg)    General: Alert and oriented x3, no acute distress  Cardiovascular/pulmonary: No labored breathing, peripheral perfusion intact  Musculoskeletal:    Left shoulder exam patient's arm is immobilized in sling. No swelling or tenderness around incision sites. Incision sites have healed. No signs of infection are present. Imaging: No new imaging obtained today. Assessment and Plan: Status post left shoulder arthroscopy, rotator cuff repair to see subscapularis single anchor), biceps tenodesis, subacromial decompression with acromioplasty      Today we discussed her left shoulder. Patient is about 4 weeks out from procedure listed above. She may discontinue the use of her sling today. We discussed beginning formal physical therapy regimen. An order was placed for physical therapy during today's visit. Patient will begin range of motion exercises. Patient verbalizes understanding. Patient will follow-up in 6 weeks. Bethel Galvan0 [facility]  Orthopedic Surgery   06/19/20  11:55 AM    Staff Addendum    I have seen and evaluated the patient and agree with the assessment and plan as documented by Jeramy Verdin CNP. I have performed the key components of the history and physical examination and concur with the findings and plan, and have made changes where appropriate/necessary.             Alfonso Berry

## 2020-06-19 ENCOUNTER — OFFICE VISIT (OUTPATIENT)
Dept: ORTHOPEDIC SURGERY | Age: 70
End: 2020-06-19

## 2020-06-19 VITALS — BODY MASS INDEX: 29.16 KG/M2 | WEIGHT: 175 LBS | TEMPERATURE: 96.3 F | HEIGHT: 65 IN

## 2020-06-19 PROCEDURE — 99024 POSTOP FOLLOW-UP VISIT: CPT | Performed by: ORTHOPAEDIC SURGERY

## 2020-06-24 ENCOUNTER — HOSPITAL ENCOUNTER (OUTPATIENT)
Dept: PHYSICAL THERAPY | Age: 70
Setting detail: THERAPIES SERIES
Discharge: HOME | End: 2020-06-24
Payer: MEDICARE

## 2020-06-24 PROCEDURE — 97161 PT EVAL LOW COMPLEX 20 MIN: CPT | Performed by: PHYSICAL THERAPIST

## 2020-06-24 PROCEDURE — 97110 THERAPEUTIC EXERCISES: CPT | Performed by: PHYSICAL THERAPIST

## 2020-06-24 ASSESSMENT — PAIN DESCRIPTION - LOCATION: LOCATION: SHOULDER

## 2020-06-24 ASSESSMENT — PAIN DESCRIPTION - ORIENTATION: ORIENTATION: LEFT

## 2020-06-24 NOTE — PROGRESS NOTES
Score                 QuickDASH Total Score: 36 (06/24/20 1425)       QuickDASH Disability/Symptom Score : 56.82 % (06/24/20 1425)                        Goals  Short term goals  Time Frame for Short term goals: 4 weeks/8 visits  Short term goal 1: Increase left shoulder ROM by 10° to 20°  Short term goal 2: Increase left shoulder strength by 1/2 MMT grade  Short term goal 3: Pt will demonstrate good tolerance and compliance to HEP  Long term goals  Time Frame for Long term goals : 8 weeks/16 visits  Long term goal 1: Increase left shoulder ROM to [facility] throughout  Long term goal 2: Increase left shoulder strength to 4/5 throughout  Long term goal 3: Pt will be able to resume all daily/functional activities using the L UE without difficulty  Long term goal 4: Pt will be independent with HEP for long-term management of symptoms  Patient Goals   Patient goals : To return to prior level of function with L UE       Therapy Time   Individual Concurrent Group Co-treatment   Time In 1005         Time Out 1040         Minutes 35         Timed Code Treatment Minutes: [address] [facility],[address], [address]  If you have any questions or concerns, please don't hesitate to call.   Thank you for your referral.    Physician Signature:________________________________Date:__________________  By signing above, therapists plan is approved by physician

## 2020-06-25 RX ORDER — EVOLOCUMAB 140 MG/ML
140 INJECTION, SOLUTION SUBCUTANEOUS
Qty: 2.1 ML | Refills: 3 | Status: SHIPPED
Start: 2020-06-25 | End: 2020-10-12 | Stop reason: SDUPTHER

## 2020-06-26 ENCOUNTER — HOSPITAL ENCOUNTER (OUTPATIENT)
Dept: PHYSICAL THERAPY | Age: 70
Setting detail: THERAPIES SERIES
Discharge: HOME | End: 2020-06-26
Payer: MEDICARE

## 2020-06-26 PROCEDURE — 97110 THERAPEUTIC EXERCISES: CPT | Performed by: PHYSICAL THERAPIST

## 2020-06-30 ENCOUNTER — HOSPITAL ENCOUNTER (OUTPATIENT)
Dept: PHYSICAL THERAPY | Age: 70
Setting detail: THERAPIES SERIES
Discharge: HOME | End: 2020-06-30
Payer: MEDICARE

## 2020-06-30 ENCOUNTER — APPOINTMENT (OUTPATIENT)
Dept: PHYSICAL THERAPY | Age: 70
End: 2020-06-30
Payer: MEDICARE

## 2020-06-30 PROCEDURE — 97110 THERAPEUTIC EXERCISES: CPT

## 2020-07-02 ENCOUNTER — APPOINTMENT (OUTPATIENT)
Dept: PHYSICAL THERAPY | Age: 70
End: 2020-07-02
Payer: MEDICARE

## 2020-07-02 ENCOUNTER — HOSPITAL ENCOUNTER (OUTPATIENT)
Dept: PHYSICAL THERAPY | Age: 70
Setting detail: THERAPIES SERIES
Discharge: HOME | End: 2020-07-02
Payer: MEDICARE

## 2020-07-02 PROCEDURE — 97110 THERAPEUTIC EXERCISES: CPT

## 2020-07-02 NOTE — PROGRESS NOTES
[address] [address] REBECCA [place]      Phone: [phone]  Fax: [phone]    Physical Therapy Daily Treatment Note  Date:  2020    Patient Name:  Phoebe Nur    :  1950  MRN: [id number]    Restrictions/Precautions:    Diagnosis:  S/p left shoulder arthroscopic surgery, rotator cuff repair  Treatment Diagnosis:    Insurance/Certification information:  [facility]  Referring Physician:  FANNY Robles  Plan of care signed (Y/N):    Visit# / total visits:   (9 visits approved by insurance)  Pain level: 4/10   Time In:  1326  Time Out:  1419    Subjective:  Pt had nothing new to report      Exercises:  Exercise/Equipment Resistance/Repetitions Other comments     Pulleys  4 minutes      Wall ladder Flex and abd x 10 reps each      UBE 3 min fwd/ 3 min bwd      Wand shoulder flex and ER x15 reps each, supine      IR stretch with towel 15 sec x 5 reps      Sidelying shoulder abd and ER x10 reps ABD, 15 reps ER      Scapular retraction 5 sec x 10 reps                                                                                             Other Therapeutic Activities:      Home Exercise Program:  20 - wand shoulder flex and ER, wall slides    Manual Treatments:  N/A    Modalities:  HP to  L shoulder x 15 mins    Timed Code Treatment Minutes: 38    Total Treatment Minutes:  53    Treatment/Activity Tolerance:  [x] Patient tolerated treatment well [] Patient limited by fatigue  [] Patient limited by pain  [] Patient limited by other medical complications  [] Other:     Prognosis: [x] Good [] Fair  [] Poor    Patient Requires Follow-up: [x] Yes  [] No    Plan:   [x] Continue per plan of care [] Alter current plan (see comments)  [] Plan of care initiated [] Hold pending MD visit [] Discharge  Plan for Next Session:        Electronically signed by:  Tristan Mo, PTA [id number]

## 2020-07-06 ENCOUNTER — HOSPITAL ENCOUNTER (OUTPATIENT)
Dept: PHYSICAL THERAPY | Age: 70
Setting detail: THERAPIES SERIES
Discharge: HOME | End: 2020-07-06
Payer: MEDICARE

## 2020-07-06 PROCEDURE — 97110 THERAPEUTIC EXERCISES: CPT

## 2020-07-06 NOTE — PROGRESS NOTES
[address] [place] [address] [place]BECCA [place]      Phone: [phone]  Fax: [phone]    Physical Therapy Daily Treatment Note  Date:  2020    Patient Name:  Gurpreet Boucher    :  1950  MRN: [id number]    Restrictions/Precautions:    Diagnosis:  S/p left shoulder arthroscopic surgery, rotator cuff repair  Treatment Diagnosis:    Insurance/Certification information:  [facility]  Referring Physician:  FANNY Brumfield  Plan of care signed (Y/N):    Visit# / total visits:   (9 visits approved by insurance)  Pain level: 6/10   Time In:  935  Time Out:  1030    Subjective:  Pt reported increase in pain , pt reported she attempted to sleep in bed last night , and woke up in more pain. Pt reported she will continue to sleep in the chair for a while longer as she tolerates it better.        Exercises:  Exercise/Equipment Resistance/Repetitions Other comments     Pulleys  4 minutes      Wall ladder Flex and abd x 10 reps each      UBE 3 min fwd/ 3 min bwd      Wand shoulder flex and ER x15 reps each, supine      IR stretch with towel 15 sec x 5 reps      Sidelying shoulder abd and ER x10 reps ABD, 15 reps ER      Scapular retraction 5 sec x 10 reps                                                                                             Other Therapeutic Activities:      Home Exercise Program:  20 - wand shoulder flex and ER, wall slides    Manual Treatments:  N/A    Modalities:  HP to  L shoulder x 15 mins    Timed Code Treatment Minutes 40    Total Treatment Minutes: 55    Treatment/Activity Tolerance:  [x] Patient tolerated treatment well [] Patient limited by fatigue  [] Patient limited by pain  [] Patient limited by other medical complications  [] Other:     Prognosis: [x] Good [] Fair  [] Poor    Patient Requires Follow-up: [x] Yes  [] No    Plan:   [x] Continue per plan of care [] Alter current plan (see comments)  [] Plan of care initiated [] Hold pending MD visit [] Discharge  Plan for Next Session:        Electronically signed by:  Isa Stanley, PTA [id number]

## 2020-07-09 ENCOUNTER — HOSPITAL ENCOUNTER (OUTPATIENT)
Dept: PHYSICAL THERAPY | Age: 70
Setting detail: THERAPIES SERIES
Discharge: HOME | End: 2020-07-09
Payer: MEDICARE

## 2020-07-09 PROCEDURE — 97110 THERAPEUTIC EXERCISES: CPT

## 2020-07-09 NOTE — PROGRESS NOTES
[address] [address] [place]      Phone: [phone]  Fax: [phone]    Physical Therapy Daily Treatment Note  Date:  2020    Patient Name:  Eliz Rueda    :  1950  MRN: [id number]    Restrictions/Precautions:    Diagnosis:  S/p left shoulder arthroscopic surgery, rotator cuff repair  Treatment Diagnosis:    Insurance/Certification information:  [address] [place] [address]  Referring Physician:  FANNY Galeano  Plan of care signed (Y/N):    Visit# / total visits:   (9 visits approved by insurance)  Pain level: 5/10   Time In:  859  Time Out:  955    Subjective:  Pt reported slight decrease in L shoulder pain this date    Exercises:  Exercise/Equipment Resistance/Repetitions Other comments     Pulleys  4 minutes      Wall ladder Flex and abd x 15 reps each      UBE 4 min fwd/ 4 min bwd      Wand shoulder flex and ER x15 reps each, supine      IR stretch with towel 15 sec x 5 reps      Sidelying shoulder abd and ER x15 reps ABD, 15 reps ER      Scapular retraction 5 sec x 10 reps             Rows  OTB x 15 reps       Shoulder ext OTB x 15 reps        IR/ER  OTB x 15 reps each             Shoulder stabilization w/ ball CW,CCW, up/down, side<> side x 10 reps each                                                Other Therapeutic Activities:      Home Exercise Program:  20 - wand shoulder flex and ER, wall slides    Manual Treatments:  N/A    Modalities:  HP to  L shoulder x 15 mins    Timed Code Treatment Minutes   41    Total Treatment Minutes: 56    Treatment/Activity Tolerance:  [x] Patient tolerated treatment well [] Patient limited by fatigue  [] Patient limited by pain  [] Patient limited by other medical complications  [] Other:     Prognosis: [x] Good [] Fair  [] Poor    Patient Requires Follow-up: [x] Yes  [] No    Plan:   [x] Continue per plan of care [] Alter current plan (see comments)  [] Plan of care initiated [] Hold pending MD visit [] Discharge  Plan for Next Session:        Electronically signed by:  Bethanie Wade, PTA [id number]

## 2020-07-13 ENCOUNTER — HOSPITAL ENCOUNTER (OUTPATIENT)
Dept: PHYSICAL THERAPY | Age: 70
Setting detail: THERAPIES SERIES
Discharge: HOME | End: 2020-07-13
Payer: MEDICARE

## 2020-07-13 PROCEDURE — 97110 THERAPEUTIC EXERCISES: CPT

## 2020-07-13 NOTE — PROGRESS NOTES
[address] [address] [address]      Phone: [phone]  Fax: [phone]    Physical Therapy Daily Treatment Note  Date:  2020    Patient Name:  Loreto Dugan    :  1950  MRN: [id number]    Restrictions/Precautions:    Diagnosis:  S/p left shoulder arthroscopic surgery, rotator cuff repair  Treatment Diagnosis:    Insurance/Certification information:  [facility]  Referring Physician:  FANNY Golden  Plan of care signed (Y/N):    Visit# / total visits:   (9 visits approved by insurance)  Pain level: 5/10   Time In:  927  Time Out:  1025    Subjective:  Pt had nothing new to report    Exercises:  Exercise/Equipment Resistance/Repetitions Other comments     Pulleys  5 minutes      Wall ladder Flex and abd x 15 reps each      UBE 5 min fwd/ 5 min bwd      Wand shoulder flex and ER x15 reps each, supine      IR stretch with towel 15 sec x 5 reps      Sidelying shoulder abd and ER x15 reps ABD, 15 reps ER      Scapular retraction 5 sec x 10 reps             Shoulder stabilization w/ ball CW,CCW, up/down, side<> side x 10 reps each             L sh        flex 155°                     °                     IR 70°                     ER 53°          MMT  L sh 3+/5 throughout                  Elbow 4+/5      Other Therapeutic Activities:      Home Exercise Program:  20 - wand shoulder flex and ER, wall slides    Manual Treatments:  N/A    Modalities:  HP to  L shoulder x 15 mins    Timed Code Treatment Minutes   43    Total Treatment Minutes: 58    Treatment/Activity Tolerance:  [x] Patient tolerated treatment well [] Patient limited by fatigue  [] Patient limited by pain  [] Patient limited by other medical complications  [] Other:     Prognosis: [x] Good [] Fair  [] Poor    Patient Requires Follow-up: [x] Yes  [] No    Plan:   [x] Continue per plan of care [] Alter current plan (see comments)  [] Plan of care initiated [] Hold pending MD visit [] Discharge  Plan for Next Session:        Electronically signed by:  Mozelle Merlin, PTA [id number]

## 2020-07-16 ENCOUNTER — HOSPITAL ENCOUNTER (OUTPATIENT)
Dept: PHYSICAL THERAPY | Age: 70
Setting detail: THERAPIES SERIES
Discharge: HOME | End: 2020-07-16
Payer: MEDICARE

## 2020-07-16 PROCEDURE — 97110 THERAPEUTIC EXERCISES: CPT

## 2020-07-16 NOTE — PROGRESS NOTES
[address] [address] [address]      Phone: [phone]  Fax: [phone]    Physical Therapy Daily Treatment Note  Date:  2020    Patient Name:  Nasrin Valdez    :  1950  MRN: [id number]    Restrictions/Precautions:    Diagnosis:  S/p left shoulder arthroscopic surgery, rotator cuff repair  Treatment Diagnosis:    Insurance/Certification information:  [address] [place] [address]  Referring Physician:  FANNY Terrell  Plan of care signed (Y/N):    Visit# / total visits:   (9 visits approved by insurance)  Pain level: 4/10   Time In:  925  Time Out:  1030    Subjective:  Pt had nothing new to report    Exercises:  Exercise/Equipment Resistance/Repetitions Other comments     Pulleys  5 minutes      Wall ladder Flex and abd x 15 reps each      UBE 5 min fwd/ 5 min bwd      Wand shoulder flex and ER x15 reps each, supine      IR stretch with towel 15 sec x 5 reps      Sidelying shoulder abd and ER x15 reps ABD, 15 reps ER      Scapular retraction 5 sec x 10 reps             Shoulder stabilization w/ ball CW,CCW, up/down, side<> side x 10 reps each         2020         L sh        flex 155°                     °                     IR 70°                     ER 53°          MMT  L sh 3+/5 throughout                  Elbow 4+/5      Other Therapeutic Activities:  N/A    Home Exercise Program:  20 - wand shoulder flex and ER, wall slides    Manual Treatments:  N/A    Modalities:  HP to  L shoulder x 15 mins    Timed Code Treatment Minutes   48    Total Treatment Minutes: 65    Treatment/Activity Tolerance:  [x] Patient tolerated treatment well [] Patient limited by fatigue  [] Patient limited by pain  [] Patient limited by other medical complications  [] Other:     Prognosis: [x] Good [] Fair  [] Poor    Patient Requires Follow-up: [x] Yes  [] No    Plan:   [x] Continue per plan of care [] Alter current plan (see comments)  [] Plan of care initiated [] Hold pending MD visit [] Discharge  Plan for Next Session:        Electronically signed by:  Franky Esparza, PTA [id number]

## 2020-07-20 ENCOUNTER — HOSPITAL ENCOUNTER (OUTPATIENT)
Dept: PHYSICAL THERAPY | Age: 70
Setting detail: THERAPIES SERIES
Discharge: HOME | End: 2020-07-20
Payer: MEDICARE

## 2020-07-20 PROCEDURE — 97110 THERAPEUTIC EXERCISES: CPT

## 2020-07-20 NOTE — PROGRESS NOTES
[address] [address] [address]      Phone: [phone]  Fax: [phone]    Physical Therapy Daily Treatment Note  Date:  2020    Patient Name:  Alexandra Hernandez    :  1950  MRN: [id number]    Restrictions/Precautions:    Diagnosis:  S/p left shoulder arthroscopic surgery, rotator cuff repair  Treatment Diagnosis:    Insurance/Certification information:  [facility]  Referring Physician:  FANNY Jay  Plan of care signed (Y/N):    Visit# / total visits:   (9 visits approved by insurance)  Pain level: 5/10   Time In:  927  Time Out:  1025    Subjective:  Pt had nothing new to report    Exercises:  Exercise/Equipment Resistance/Repetitions Other comments     Pulleys  5 minutes      Wall ladder Flex and abd x 15 reps each      UBE 5 min fwd/ 5 min bwd      Wand shoulder flex and ER x15 reps each, supine      IR stretch with towel 15 sec x 5 reps      Sidelying shoulder abd and ER x15 reps ABD, 15 reps ER      Scapular retraction 5 sec x 10 reps             Shoulder stabilization w/ ball CW,CCW, up/down, side<> side x 10 reps each         2020         L sh        flex 155°                     °                     IR 70°                     ER 53°          MMT  L sh 3+/5 throughout                  Elbow 4+/5      Other Therapeutic Activities:  N/A    Home Exercise Program:  20 - wand shoulder flex and ER, wall slides    Manual Treatments:  N/A    Modalities:  HP to  L shoulder x 15 mins    Timed Code Treatment Minutes   43    Total Treatment Minutes: 58    Treatment/Activity Tolerance:  [x] Patient tolerated treatment well [] Patient limited by fatigue  [] Patient limited by pain  [] Patient limited by other medical complications  [] Other:     Prognosis: [x] Good [] Fair  [] Poor    Patient Requires Follow-up: [x] Yes  [] No    Plan:   [x] Continue per plan of care [] Alter current plan (see comments)  [] Plan of care initiated [] Hold pending MD visit [] Discharge  Plan for Next Session:        Electronically signed by:  Mata Alegre, PTA [id number]

## 2020-07-22 ENCOUNTER — OFFICE VISIT (OUTPATIENT)
Dept: PRIMARY CARE CLINIC | Age: 70
End: 2020-07-22
Payer: MEDICARE

## 2020-07-22 ENCOUNTER — HOSPITAL ENCOUNTER (OUTPATIENT)
Age: 70
Discharge: HOME | End: 2020-07-24
Payer: MEDICARE

## 2020-07-22 VITALS
BODY MASS INDEX: 29.82 KG/M2 | HEART RATE: 67 BPM | HEIGHT: 65 IN | WEIGHT: 179 LBS | SYSTOLIC BLOOD PRESSURE: 136 MMHG | TEMPERATURE: 98.6 F | OXYGEN SATURATION: 98 % | DIASTOLIC BLOOD PRESSURE: 80 MMHG

## 2020-07-22 LAB
ALBUMIN SERPL-MCNC: 4.4 G/DL (ref 3.5–5.2)
ALP BLD-CCNC: 56 U/L (ref 35–104)
ALT SERPL-CCNC: 25 U/L (ref 0–32)
ANION GAP SERPL CALCULATED.3IONS-SCNC: 12 MMOL/L (ref 7–16)
AST SERPL-CCNC: 25 U/L (ref 0–31)
BILIRUB SERPL-MCNC: 0.4 MG/DL (ref 0–1.2)
BUN BLDV-MCNC: 19 MG/DL (ref 8–23)
CALCIUM SERPL-MCNC: 9.4 MG/DL (ref 8.6–10.2)
CHLORIDE BLD-SCNC: 103 MMOL/L (ref 98–107)
CHOLESTEROL, TOTAL: 169 MG/DL (ref 0–199)
CO2: 25 MMOL/L (ref 22–29)
CREAT SERPL-MCNC: 0.7 MG/DL (ref 0.5–1)
GFR AFRICAN AMERICAN: >60
GFR NON-AFRICAN AMERICAN: >60 ML/MIN/1.73
GLUCOSE BLD-MCNC: 90 MG/DL (ref 74–99)
HCT VFR BLD CALC: 40.7 % (ref 34–48)
HDLC SERPL-MCNC: 45 MG/DL
HEMOGLOBIN: 12.6 G/DL (ref 11.5–15.5)
LDL CHOLESTEROL CALCULATED: 70 MG/DL (ref 0–99)
MCH RBC QN AUTO: 29.4 PG (ref 26–35)
MCHC RBC AUTO-ENTMCNC: 31 % (ref 32–34.5)
MCV RBC AUTO: 95.1 FL (ref 80–99.9)
PDW BLD-RTO: 14 FL (ref 11.5–15)
PLATELET # BLD: 246 E9/L (ref 130–450)
PMV BLD AUTO: 11.3 FL (ref 7–12)
POTASSIUM SERPL-SCNC: 4.8 MMOL/L (ref 3.5–5)
RBC # BLD: 4.28 E12/L (ref 3.5–5.5)
SODIUM BLD-SCNC: 140 MMOL/L (ref 132–146)
TOTAL PROTEIN: 7.1 G/DL (ref 6.4–8.3)
TRIGL SERPL-MCNC: 270 MG/DL (ref 0–149)
VLDLC SERPL CALC-MCNC: 54 MG/DL
WBC # BLD: 5.9 E9/L (ref 4.5–11.5)

## 2020-07-22 PROCEDURE — 85027 COMPLETE CBC AUTOMATED: CPT

## 2020-07-22 PROCEDURE — 80061 LIPID PANEL: CPT

## 2020-07-22 PROCEDURE — 80053 COMPREHEN METABOLIC PANEL: CPT

## 2020-07-22 PROCEDURE — G0439 PPPS, SUBSEQ VISIT: HCPCS | Performed by: INTERNAL MEDICINE

## 2020-07-22 ASSESSMENT — LIFESTYLE VARIABLES: HOW OFTEN DO YOU HAVE A DRINK CONTAINING ALCOHOL: 0

## 2020-07-22 ASSESSMENT — PATIENT HEALTH QUESTIONNAIRE - PHQ9
SUM OF ALL RESPONSES TO PHQ QUESTIONS 1-9: 1
SUM OF ALL RESPONSES TO PHQ QUESTIONS 1-9: 1

## 2020-07-22 NOTE — PROGRESS NOTES
by mouth daily Yes Historical Provider, MD   omeprazole (PRILOSEC) 20 MG capsule Take 20 mg by mouth every morning Instructed to take with sip water am of procedure Yes Historical Provider, MD   Omega-3 Fatty Acids (FISH OIL) 1000 MG CAPS Take 2,000 mg by mouth 2 times daily  Yes Historical Provider, MD   sodium chloride (OCEAN, BABY AYR) 0.65 % nasal spray 1 spray by Nasal route as needed for Congestion  Historical Provider, MD       Past Medical History:   Diagnosis Date    Arthritis     Hyperlipidemia     Right shoulder pain        Past Surgical History:   Procedure Laterality Date    COLONOSCOPY      EYE SURGERY Bilateral     Cataracts    HYSTERECTOMY      SHOULDER ARTHROSCOPY Right 9/5/2019    RIGHT SHOULDER ARTHROSCOPY ROTATOR CUFF REPAIR POSSIBLE BICEPS TENODESIS performed by Michaela Bravo MD at [address] [facility] ARTHROSCOPY Left 5/21/2020    LEFT SHOULDER ARTHROSCOPY, ROTATOR CUFF REPAIR BICEPS TENODESIS (ARTHREX, INTERSCALENE BLOCK) performed by Michaela Bravo MD at [address] [facility] URETEROSCOPY Left 09/14/2015       No family history on file. CareTeam (Including outside providers/suppliers regularly involved in providing care):   Patient Care Team:  Estella Reece DO as PCP - General  Estella Reece DO as PCP - [facility] Empaneled Provider    Wt Readings from Last 3 Encounters:   07/22/20 179 lb (81.2 kg)   06/19/20 175 lb (79.4 kg)   05/22/20 180 lb (81.6 kg)     Vitals:    07/22/20 0835   BP: 136/80   Pulse: 67   Temp: 98.6 °F (37 °C)   SpO2: 98%   Weight: 179 lb (81.2 kg)   Height: 5' 5\" (1.651 m)     Body mass index is 29.79 kg/m². Based upon direct observation of the patient, evaluation of cognition reveals recent and remote memory intact.     General Appearance: alert and oriented to person, place and time, well developed and well- nourished, in no acute distress  Skin: warm and dry, no rash or erythema  Head: normocephalic and atraumatic  Eyes: pupils equal, round, and reactive to light, extraocular eye movements intact, conjunctivae normal  ENT: tympanic membrane, external ear and ear canal normal bilaterally, nose without deformity, nasal mucosa and turbinates normal without polyps  Neck: supple and non-tender without mass, no thyromegaly or thyroid nodules, no cervical lymphadenopathy  Pulmonary/Chest: clear to auscultation bilaterally- no wheezes, rales or rhonchi, normal air movement, no respiratory distress  Cardiovascular: normal rate, regular rhythm, normal S1 and S2, no murmurs, rubs, clicks, or gallops, distal pulses intact, no carotid bruits  Abdomen: soft, non-tender, non-distended, normal bowel sounds, no masses or organomegaly  Extremities: no cyanosis, clubbing or edema  Musculoskeletal: normal range of motion, no joint swelling, deformity or tenderness  Neurologic: reflexes normal and symmetric, no cranial nerve deficit, gait, coordination and speech normal    Patient's complete Health Risk Assessment and screening values have been reviewed and are found in Flowsheets. The following problems were reviewed today and where indicated follow up appointments were made and/or referrals ordered. Positive Risk Factor Screenings with Interventions:     General Health:  General  In general, how would you say your health is?: Very Good  In the past 7 days, have you experienced any of the following?  New or Increased Pain, New or Increased Fatigue, Loneliness, Social Isolation, Stress or Anger?: (!) New or Increased Pain  Do you get the social and emotional support that you need?: Yes  Do you have a Living Will?: Yes  General Health Risk Interventions:  · Left shoulder and elbow and wrist pain    Hearing/Vision:  No exam data present  Hearing/Vision  Do you or your family notice any trouble with your hearing?: No  Do you have difficulty driving, watching TV, or doing any of your daily activities because of your eyesight?: No  Have you had an eye exam within the past year?: (!) No  Hearing/Vision Interventions:  · Vision concerns:  patient encouraged to make appointment with his/her eye specialist    Personalized Preventive Plan   Current Health Maintenance Status  Immunization History   Administered Date(s) Administered    Influenza, High Dose (Fluzone 65 yrs and older) 09/27/2016, 10/01/2018    Pneumococcal Conjugate 13-valent ([id number]) 08/22/2017    Pneumococcal Polysaccharide ([id number]) 07/25/2014    Zoster Live (Zostavax) 07/20/2014        Health Maintenance   Topic Date Due    Hepatitis C screen  1950    DTaP/Tdap/Td vaccine (1 - Tdap) 10/20/1968    Shingles Vaccine (2 of 3) 09/14/2014    Annual Wellness Visit (AWV)  05/29/2019    Pneumococcal 65+ years Vaccine (2 of 2 - PPSV23) 07/25/2019    Flu vaccine (1) 09/01/2020    Breast cancer screen  07/15/2021    Lipid screen  05/16/2025    Colon cancer screen colonoscopy  03/02/2030    DEXA (modify frequency per FRAX score)  Completed    Hepatitis A vaccine  Aged Out    Hepatitis B vaccine  Aged Out    Hib vaccine  Aged Out    Meningococcal (ACWY) vaccine  Aged Out     Recommendations for Group[employer] Due: see orders and patient instructions/AVS.  . Recommended screening schedule for the next 5-10 years is provided to the patient in written form: see Patient Instructions/AVS.    There are no diagnoses linked to this encounter. Advance Care Planning   Advanced Care Planning: Discussed the patients choices for care and treatment in case of a health event that adversely affects decision-making abilities. Also discussed the patients long-term treatment options. Reviewed with the patient the [address] [place] [address] Will Declaration forms  Reviewed the process of designating a competent adult as an Agent (or -in-fact) that could take make health care decisions for the patient if incompetent.  Patient was asked to complete the declaration forms, either acknowledge the forms by a public notary or an eligible witness and provide a signed copy to the practice office.   Time spent (minutes): 5 min    Will check BW since on repathia  Following with ortho  Start tylenol in conjunction with voltaren  consider radiography of the left wrist and elbow

## 2020-07-22 NOTE — PATIENT INSTRUCTIONS
(Maybe you're afraid of having pain, losing your independence, or being kept alive by machines.)  · Where would you prefer to die? (Your home? A hospital? A nursing home?)  · Do you want to donate your organs when you die? · Do you want certain Restorationism practices performed before you die? When should you call for help? Be sure to contact your doctor if you have any questions. Where can you learn more? Go to https://chpepiceweb.[email or web address] org and sign in to your Fixed - Parking Tickets account. Enter R264 in the MobiliBuy box to learn more about \"Advance Directives: Care Instructions. \"     If you do not have an account, please click on the \"Sign Up Now\" link. Current as of: December 9, 2019               Content Version: 12.5  © 5274-7530 Healthwise, Incorporated. Care instructions adapted under license by [facility] ([facility]). If you have questions about a medical condition or this instruction, always ask your healthcare professional. [address] [address] any warranty or liability for your use of this information. Learning About Medical Power of   What is a medical power of ? A medical power of , also called a durable power of  for health care, is one type of the legal forms called advance directives. It lets you name the person you want to make treatment decisions for you if you can't speak or decide for yourself. The person you choose is called your health care agent. This person is also called a health care proxy or health care surrogate. A medical power of  may be called something else in your state. How do you choose a health care agent? Choose your health care agent carefully. This person may or may not be a family member. Talk to the person before you make your final decision. Make sure he or she is comfortable with this responsibility. It's a good idea to choose someone who:  · Is at least 25years old.   · Knows you well and you learn more? Go to [email or web address].[email or web address] and sign in to your Hull account. Enter [id number] in the [facility] box to learn more about \"Learning About [address]. \"     If you do not have an account, please click on the \"Sign Up Now\" link. Current as of: December 9, 2019               Content Version: 12.5  © 6895-4966 [employer]. Care instructions adapted under license by [facility] ([facility]). If you have questions about a medical condition or this instruction, always ask your healthcare professional. [address] any warranty or liability for your use of this information. Learning About Living [place]  What is a living will? A living will, also called a declaration, is a legal form. It tells your family and your doctor your wishes when you can't speak for yourself. It's used by the health professionals who will treat you as you near the end of your life or if you get seriously hurt or ill. If you put your wishes in writing, your loved ones and others will know what kind of care you want. They won't need to guess. This can ease your mind and be helpful to others. And you can change or cancel your living will at any time. A living will is not the same as an estate or property will. An estate will explains what you want to happen with your money and property after you die. How do you use it? A living will is used to describe the kinds of treatment or life support you want as you near the end of your life or if you get seriously hurt or ill. Keep these facts in mind about living hines. · Your living will is used only if you can't speak or make decisions for yourself. Most often, one or more doctors must certify that you can't speak or decide for yourself before your living will takes effect. · If you get better and can speak for yourself again, you can accept or refuse any treatment.  It doesn't matter what you said in your living will. · Some states may limit your right to refuse treatment in certain cases. For example, you may need to clearly state in your living will that you don't want artificial hydration and nutrition, such as being fed through a tube. Is a living will a legal document? A living will is a legal document. Each state has its own laws about living hines. And a living will may be called something else in your state. Here are some things to know about living hines. · You don't need an  to complete a living will. But legal advice can be helpful if your state's laws are unclear. It can also help if your health history is complicated or your family can't agree on what should be in your living will. · You can change your living will at any time. Some people find that their wishes about end-of-life care change as their health changes. If you make big changes to your living will, complete a new form. · If you move to another state, make sure that your living will is legal in the state where you now live. In most cases, doctors will respect your wishes even if you have a form from a different state. · You might use a universal form that has been approved by many states. This kind of form can sometimes be filled out and stored online. Your digital copy will then be available wherever you have a connection to the internet. The doctors and nurses who need to treat you can find it right away. · Your state may offer an online registry. This is another place where you can store your living will online. · It's a good idea to get your living will notarized. This means using a person called a  to watch two people sign, or witness, your living will. What should you know when you create a living will? Here are some questions to ask yourself as you make your living will:  · Do you know enough about life support methods that might be used?  If not, talk to your doctor so you know what might be done if you can't breathe on your own, your heart stops, or you can't swallow. · What things would you still want to be able to do after you receive life-support methods? Would you want to be able to walk? To speak? To eat on your own? To live without the help of machines? · Do you want certain Quaker practices performed if you become very ill? · If you have a choice, where do you want to be cared for? In your home? At a hospital or nursing home? · If you have a choice at the end of your life, where would you prefer to die? At home? In a hospital or nursing home? Somewhere else? · Would you prefer to be buried or cremated? · Do you want your organs to be donated after you die? What should you do with your living will? · Make sure that your family members and your health care agent have copies of your living will (also called a declaration). · Give your doctor a copy of your living will. Ask him or her to keep it as part of your medical record. If you have more than one doctor, make sure that each one has a copy. · Put a copy of your living will where it can be easily found. For example, some people may put a copy on their refrigerator door. If you are using a digital copy, be sure your doctor, family members, and health care agent know how to find and access it. Where can you learn more? Go to https://chpepiceweb.[email or web address] org and sign in to your Redapt account. Enter D709 in the [facility] box to learn more about \"Learning About Living [place]. \"     If you do not have an account, please click on the \"Sign Up Now\" link. Current as of: December 9, 2019               Content Version: 12.5  © 6944-7653 Healthwise, Incorporated. Care instructions adapted under license by [address]  If you have questions about a medical condition or this instruction, always ask your healthcare professional. Fabienne Good any warranty or liability for your use of this information. Personalized Preventive Plan for Loreto Dugan - 7/22/2020  Medicare offers a range of preventive health benefits. Some of the tests and screenings are paid in full while other may be subject to a deductible, co-insurance, and/or copay. Some of these benefits include a comprehensive review of your medical history including lifestyle, illnesses that may run in your family, and various assessments and screenings as appropriate. After reviewing your medical record and screening and assessments performed today your provider may have ordered immunizations, labs, imaging, and/or referrals for you. A list of these orders (if applicable) as well as your Preventive Care list are included within your After Visit Summary for your review. Other Preventive Recommendations:    · A preventive eye exam performed by an eye specialist is recommended every 1-2 years to screen for glaucoma; cataracts, macular degeneration, and other eye disorders. · A preventive dental visit is recommended every 6 months. · Try to get at least 150 minutes of exercise per week or 10,000 steps per day on a pedometer . · Order or download the FREE \"Exercise & Physical Activity: Your Everyday Guide\" from [employer]. Call 2-743.663.2983 or search [employer] online. · You need 3348-0568 mg of calcium and 0004-6402 IU of vitamin D per day. It is possible to meet your calcium requirement with diet alone, but a vitamin D supplement is usually necessary to meet this goal.  · When exposed to the sun, use a sunscreen that protects against both UVA and UVB radiation with an SPF of 30 or greater. Reapply every 2 to 3 hours or after sweating, drying off with a towel, or swimming. · Always wear a seat belt when traveling in a car. Always wear a helmet when riding a bicycle or motorcycle.

## 2020-07-23 ENCOUNTER — HOSPITAL ENCOUNTER (OUTPATIENT)
Dept: PHYSICAL THERAPY | Age: 70
Setting detail: THERAPIES SERIES
Discharge: HOME | End: 2020-07-23
Payer: MEDICARE

## 2020-07-23 PROCEDURE — 97110 THERAPEUTIC EXERCISES: CPT

## 2020-07-23 NOTE — PROGRESS NOTES
[address] [address] [address]      Phone: [phone]  Fax: [phone]    Physical Therapy Daily Treatment Note  Date:  2020    Patient Name:  Eliz Rueda    :  1950  MRN: [id number]    Restrictions/Precautions:    Diagnosis:  S/p left shoulder arthroscopic surgery, rotator cuff repair  Treatment Diagnosis:    Insurance/Certification information:  [address] [facility]  Referring Physician:  FANNY Galeano  Plan of care signed (Y/N):    Visit# / total visits:  10/19 (9 visits approved by insurance)  Pain level: 4/10   Time In:  925  Time Out:  1023    Subjective:  Pt had nothing new to report    Exercises:  Exercise/Equipment Resistance/Repetitions Other comments     Pulleys  5 minutes      Wall ladder Flex and abd x 15 reps each      UBE 5 min fwd/ 5 min bwd      Wand shoulder flex and ER x15 reps each, supine      IR stretch with towel 15 sec x 5 reps      Sidelying shoulder abd and ER x15 reps ABD, 15 reps ER      Scapular retraction 5 sec x 10 reps             Shoulder stabilization w/ ball CW,CCW, up/down, side<> side x 10 reps each         2020     L sh        flex 155° 155°                    ° 140°                    IR 70° 70°                    ER 53° 57°         MMT  L sh 3+/5 throughout 3+/5 throughout                 Elbow 4+/5 4+/5     Other Therapeutic Activities:  N/A    Home Exercise Program:  20 - wand shoulder flex and ER, wall slides    Manual Treatments:  N/A    Modalities:  HP to  L shoulder x 15 mins    Timed Code Treatment Minutes   43    Total Treatment Minutes: 58    Treatment/Activity Tolerance:  [x] Patient tolerated treatment well [] Patient limited by fatigue  [] Patient limited by pain  [] Patient limited by other medical complications  [] Other:     Prognosis: [x] Good [] Fair  [] Poor    Patient Requires Follow-up: [x] Yes  [] No    Plan:   [x] Continue per plan of care [] Alter current plan (see comments)  [] Plan of care initiated [] Hold pending MD visit [] Discharge  Plan for Next Session:        Electronically signed by:  Mata Alegre, PTA [id number]

## 2020-07-27 ENCOUNTER — HOSPITAL ENCOUNTER (OUTPATIENT)
Dept: PHYSICAL THERAPY | Age: 70
Setting detail: THERAPIES SERIES
Discharge: HOME | End: 2020-07-27
Payer: MEDICARE

## 2020-07-27 PROCEDURE — 97110 THERAPEUTIC EXERCISES: CPT

## 2020-07-30 ENCOUNTER — HOSPITAL ENCOUNTER (OUTPATIENT)
Dept: PHYSICAL THERAPY | Age: 70
Setting detail: THERAPIES SERIES
Discharge: HOME | End: 2020-07-30
Payer: MEDICARE

## 2020-07-30 PROCEDURE — 97110 THERAPEUTIC EXERCISES: CPT

## 2020-07-30 NOTE — PROGRESS NOTES
[address] [address] [place]      Phone: [phone]  Fax: [phone]    Physical Therapy Daily Treatment Note  Date:  2020    Patient Name:  Viji Clarke    :  1950  MRN: [id number]    Restrictions/Precautions:    Diagnosis:  S/p left shoulder arthroscopic surgery, rotator cuff repair  Treatment Diagnosis:    Insurance/Certification information:  [employer]  Referring Physician:  FANNY Graves  Plan of care signed (Y/N):    Visit# / total visits:   (9 visits approved by insurance)  Pain level: 4/10   Time In:  930  Time Out:  1030    Subjective:  Pt had nothing new to report. Pt has follow up w/ Dr. Brent Nagel tomorrow per pt .     Exercises:  Exercise/Equipment Resistance/Repetitions Other comments     Pulleys  5 minutes      Wall ladder Flex and abd x 15 reps each      UBE 5 min fwd/ 5 min bwd      Wand shoulder flex and ER x15 reps each, supine      IR stretch with towel 15 sec x 5 reps      Sidelying shoulder abd and ER x15 reps ABD, 15 reps ER      Scapular retraction 5 sec x 10 reps             Shoulder stabilization w/ ball CW,CCW, up/down, side<> side x 10 reps each         2020     L sh        flex 155° 155°                    ° 140°                    IR 70° 70°                    ER 53° 57°         MMT  L sh 3+/5 throughout 3+/5 throughout                 Elbow 4+/5 4+/5     Other Therapeutic Activities:  N/A    Home Exercise Program:  20 - wand shoulder flex and ER, wall slides    Manual Treatments:  N/A    Modalities:  HP to  L shoulder x 15 mins    Timed Code Treatment Minutes   45    Total Treatment Minutes: 60    Treatment/Activity Tolerance:  [x] Patient tolerated treatment well [] Patient limited by fatigue  [] Patient limited by pain  [] Patient limited by other medical complications  [] Other:     Prognosis: [x] Good [] Fair  [] Poor    Patient Requires Follow-up: [x] Yes  [] No    Plan:   [x] Continue per plan of care [] Alter current plan (see comments)  [] Plan of care initiated [] Hold pending MD visit [] Discharge  Plan for Next Session:        Electronically signed by:  Franky Esparza, PTA [id number]

## 2020-07-31 ENCOUNTER — OFFICE VISIT (OUTPATIENT)
Dept: ORTHOPEDIC SURGERY | Age: 70
End: 2020-07-31

## 2020-07-31 VITALS — HEIGHT: 65 IN | TEMPERATURE: 97.3 F | BODY MASS INDEX: 29.82 KG/M2 | WEIGHT: 179 LBS

## 2020-07-31 PROCEDURE — 99024 POSTOP FOLLOW-UP VISIT: CPT | Performed by: ORTHOPAEDIC SURGERY

## 2020-07-31 NOTE — PROGRESS NOTES
Follow Up Post Operative Visit     Surgery: Left shoulder arthroscopy, rotator cuff repair (subscapularis single anchor), biceps tenodesis, subacromial decompression with acromioplasty   Date: 5/21/2020    Subjective:    Vince Deluca is here for follow up visit s/p above procedure. She is doing well. She has been making good progress with therapy    Controlled Substances Monitoring:        Physical Exam:    No data recorded    General: Alert and oriented x3, no acute distress  Cardiovascular/pulmonary: No labored breathing, peripheral perfusion intact  Musculoskeletal:    On exam, range of motion is full, 170/45/T8. There is good strength with belly press test and no Cb deformity      Imaging: No new images today. Previous images reviewed    Assessment and Plan: Status post left shoulder subscapularis repair and biceps tenodesis  She is doing very well. She will continue to progress with therapy with strengthening continue to maintain range of motion. Follow-up in 6 weeks.     Marcel Andino MD  Orthopaedic Surgery   7/31/20  8:42 AM

## 2020-08-03 ENCOUNTER — HOSPITAL ENCOUNTER (OUTPATIENT)
Dept: PHYSICAL THERAPY | Age: 70
Setting detail: THERAPIES SERIES
Discharge: HOME | End: 2020-08-03
Payer: MEDICARE

## 2020-08-03 PROCEDURE — 97110 THERAPEUTIC EXERCISES: CPT

## 2020-08-03 NOTE — PROGRESS NOTES
[address] [address] [place]      Phone: [phone]  Fax: [phone]    Physical Therapy Daily Treatment Note  Date:  8/3/2020    Patient Name:  Lori Tripathi    :  1950  MRN: [id number]    Restrictions/Precautions:    Diagnosis:  S/p left shoulder arthroscopic surgery, rotator cuff repair  Treatment Diagnosis:    Insurance/Certification information:  [facility]  Referring Physician:  FANNY Fox  Plan of care signed (Y/N):    Visit# / total visits:   (9 visits approved by insurance)  Pain level: 4-6/10   Time In:  934  Time Out:  1037    Subjective:  Pt reported having a bad night and was up several times d/t L UE discomfort. Pt had her follow up w/ Dr. Ana Sanchez, 20. Per physician note, pt to continue therapy and strengthening per protocol.       Exercises:  Exercise/Equipment Resistance/Repetitions Other comments     Pulleys  5 minutes      Wall ladder Flex and abd x 15 reps each      UBE 5 min fwd/ 5 min bwd      Wand shoulder flex and ER x15 reps each, supine      IR stretch with towel 15 sec x 5 reps      Sidelying shoulder abd and ER x15 reps ABD, 15 reps ER      Scapular retraction 5 sec x 10 reps             Shoulder stabilization w/ ball CW,CCW, up/down, side<> side x 10 reps each    Standing bent row, and ext  X 10 reps each    Front / lateral raises  X 10 reps each                   2020         L sh        flex 155°                     °                     IR 70 °                     ER 57°          MMT  L sh 3+/5 throughout                  Elbow 4+/5      Other Therapeutic Activities:  N/A    Home Exercise Program:  20 - wand shoulder flex and ER, wall slides    Manual Treatments:  N/A    Modalities:  HP to  L shoulder x 15 mins    Timed Code Treatment Minutes   48    Total Treatment Minutes: 63    Treatment/Activity Tolerance:  [x] Patient tolerated treatment well [] Patient limited by fatigue  [] Patient limited by pain  [] Patient limited by other medical complications  [] Other:     Prognosis: [x] Good [] Fair  [] Poor    Patient Requires Follow-up: [x] Yes  [] No    Plan:   [x] Continue per plan of care [] Alter current plan (see comments)  [] Plan of care initiated [] Hold pending MD visit [] Discharge  Plan for Next Session:        Electronically signed by:  Mata Alegre, PTA [id number]

## 2020-08-06 ENCOUNTER — HOSPITAL ENCOUNTER (OUTPATIENT)
Dept: PHYSICAL THERAPY | Age: 70
Setting detail: THERAPIES SERIES
Discharge: HOME | End: 2020-08-06
Payer: MEDICARE

## 2020-08-06 PROCEDURE — 97110 THERAPEUTIC EXERCISES: CPT

## 2020-08-06 NOTE — PROGRESS NOTES
[address] [address] [address] [place]      Phone: [phone]  Fax: [phone]    Physical Therapy Daily Treatment Note  Date:  2020    Patient Name:  Mildred Jama    :  1950  MRN: [id number]    Restrictions/Precautions:    Diagnosis:  S/p left shoulder arthroscopic surgery, rotator cuff repair  Treatment Diagnosis:    Insurance/Certification information:  [address] [place] [address]  Referring Physician:  FANNY Lindsey  Plan of care signed (Y/N):    Visit# / total visits:   (19 visits approved by insurance)  Pain level: 5/10   Time In:  930  Time Out:  1030    Subjective:  Pt reported sleeping better     Exercises:  Exercise/Equipment Resistance/Repetitions Other comments     Pulleys  5 minutes      Wall ladder Flex and abd x 15 reps each      UBE 5 min fwd/ 5 min bwd      Wand shoulder flex and ER x15 reps each, supine      IR stretch with towel 15 sec x 5 reps      Sidelying shoulder abd and ER x15 reps ABD, 15 reps ER      Scapular retraction 5 sec x 10 reps HEP            Shoulder stabilization w/ ball CW,CCW, up/down, side<> side x 10 reps each    Standing bent: row, ext , flex and ABD X 10 reps each Tolerated well                     2020         L sh        flex 155°                     °                     IR 70 °                     ER 57°          MMT  L sh 3+/5 throughout                  Elbow 4+/5      Other Therapeutic Activities:  N/A    Home Exercise Program:  20 - wand shoulder flex and ER, wall slides, scapular retraction    Manual Treatments:  N/A    Modalities:  HP to  L shoulder x 15 mins    Timed Code Treatment Minutes   45    Total Treatment Minutes: 60    Treatment/Activity Tolerance:  [x] Patient tolerated treatment well [] Patient limited by fatigue  [] Patient limited by pain  [] Patient limited by other medical complications  [] Other:     Prognosis: [x] Good [] Fair  [] Poor    Patient Requires Follow-up: [x] Yes  [] No    Plan:   [x] Continue per plan of care [] Alter current plan (see comments)  [] Plan of care initiated [] Hold pending MD visit [] Discharge  Plan for Next Session:        Electronically signed by:  Flower Reagan, PTA [id number]

## 2020-08-10 ENCOUNTER — HOSPITAL ENCOUNTER (OUTPATIENT)
Dept: PHYSICAL THERAPY | Age: 70
Setting detail: THERAPIES SERIES
Discharge: HOME | End: 2020-08-10
Payer: MEDICARE

## 2020-08-10 PROCEDURE — 97110 THERAPEUTIC EXERCISES: CPT

## 2020-08-10 NOTE — PROGRESS NOTES
[address] [address] [place]      Phone: [phone]  Fax: [phone]    Physical Therapy Daily Treatment Note  Date:  8/10/2020    Patient Name:  Luis A Chang    :  1950  MRN: [id number]    Restrictions/Precautions:    Diagnosis:  S/p left shoulder arthroscopic surgery, rotator cuff repair  Treatment Diagnosis:    Insurance/Certification information:  [facility]  Referring Physician:  FANNY Stern  Plan of care signed (Y/N):    Visit# / total visits:  15/19 (19 visits approved by insurance)  Pain level: 5/10   Time In:  927  Time Out:  1028    Subjective:  Pt reported having a bad night w/ interrupted sleep d/t pain in L UE , upper traps, elbow and wrist.  Pt reported she had spoken to her doctor previously about this and pt stated, the doctor thought it was d/t arthritis, \" at least in elbow and wrist.\" per pt     Exercises:  Exercise/Equipment Resistance/Repetitions Other comments     Pulleys  5 minutes      Wall ladder Flex and abd x 15 reps each      UBE 5 min fwd/ 5 min bwd      Wand shoulder flex and ER x15 reps each, supine      IR stretch with towel 15 sec x 5 reps      Sidelying shoulder abd and ER x15 reps ABD, 15 reps ER      Scapular retraction 5 sec x 10 reps HEP            Shoulder stabilization w/ ball CW,CCW, up/down, side<> side x 10 reps each    Standing bent: row, ext , flex and ABD X 10 reps each Tolerated well                     2020         L sh        flex 155°                     °                     IR 70 °                     ER 57°          MMT  L sh 3+/5 throughout                  Elbow 4+/5      Other  8/10/20 pt can actively perform L sh flex and ABD against gravity without compensatory movements     Home Exercise Program:  20 - wand shoulder flex and ER, wall slides, scapular retraction    Manual Treatments:  N/A    Modalities:  HP to  L shoulder x 15 mins    Timed Code Treatment Minutes   46    Total Treatment Minutes: 64    Treatment/Activity Tolerance:  [x] Patient tolerated treatment well [] Patient limited by fatigue  [] Patient limited by pain  [] Patient limited by other medical complications  [] Other:     Prognosis: [x] Good [] Fair  [] Poor    Patient Requires Follow-up: [x] Yes  [] No    Plan:   [x] Continue per plan of care [] Alter current plan (see comments)  [] Plan of care initiated [] Hold pending MD visit [] Discharge  Plan for Next Session:        Electronically signed by:  Becka Nguyen, PTA [id number]

## 2020-08-13 ENCOUNTER — HOSPITAL ENCOUNTER (OUTPATIENT)
Dept: PHYSICAL THERAPY | Age: 70
Setting detail: THERAPIES SERIES
Discharge: HOME | End: 2020-08-13
Payer: MEDICARE

## 2020-08-13 PROCEDURE — 97110 THERAPEUTIC EXERCISES: CPT

## 2020-08-13 RX ORDER — CITALOPRAM 20 MG/1
20 TABLET ORAL DAILY
Qty: 90 TABLET | Refills: 1 | Status: SHIPPED
Start: 2020-08-13 | End: 2021-02-03 | Stop reason: SDUPTHER

## 2020-08-13 RX ORDER — OMEPRAZOLE 20 MG/1
20 CAPSULE, DELAYED RELEASE ORAL EVERY MORNING
Qty: 90 CAPSULE | Refills: 1 | Status: CANCELLED | OUTPATIENT
Start: 2020-08-13

## 2020-08-13 NOTE — PROGRESS NOTES
[address] [address] [place]      Phone: [phone]  Fax: [phone]    Physical Therapy Daily Treatment Note  Date:  2020    Patient Name:  Veronica Dan    :  1950  MRN: [id number]    Restrictions/Precautions:    Diagnosis:  S/p left shoulder arthroscopic surgery, rotator cuff repair  Treatment Diagnosis:    Insurance/Certification information:  [facility]  Referring Physician:  FANNY Montes  Plan of care signed (Y/N):    Visit# / total visits:   (19 visits approved by insurance)  Pain level: 4/10   Time In:  930  Time Out:  1030    Subjective:  Pt had nothing new to report.   Stated, \" I feel pretty good\"     Exercises:  Exercise/Equipment Resistance/Repetitions Other comments     Pulleys  5 minutes      Wall ladder Flex and abd x 15 reps each      UBE 5 min fwd/ 5 min bwd      Wand shoulder flex and ER x15 reps each, supine      IR stretch with towel 15 sec x 5 reps      Sidelying shoulder abd and ER 1#  x15 reps ABD, 15 reps ER      Scapular retraction 5 sec x 10 reps             Shoulder stabilization w/ ball CW,CCW, up/down, side<> side x 10 reps each    Standing bent: row, ext , flex and ABD 1 # X 10 reps each Tolerated well   Front / lateral raises  TBA    rows OTB x 10 reps     IR/ER  OTB x 10 reps          2020         L sh        flex 155°                     °                     IR 70 °                     ER 57°          MMT  L sh 3+/5 throughout                  Elbow 4+/5      Other  8/10/20 pt can actively perform L sh flex and ABD against gravity without compensatory movements     Home Exercise Program:  20 - wand shoulder flex and ER, wall slides, scapular retraction    Manual Treatments:  N/A    Modalities:  HP to  L shoulder x 15 mins    Timed Code Treatment Minutes   45    Total Treatment Minutes: 60    Treatment/Activity Tolerance:  [x] Patient tolerated treatment well [] Patient limited by fatigue  [] Patient limited by pain  [] Patient limited by other medical complications  [] Other:     Prognosis: [x] Good [] Fair  [] Poor    Patient Requires Follow-up: [x] Yes  [] No    Plan:   [x] Continue per plan of care [] Alter current plan (see comments)  [] Plan of care initiated [] Hold pending MD visit [] Discharge  Plan for Next Session:        Electronically signed by:  Paula Headley, PTA [id number]

## 2020-08-17 ENCOUNTER — HOSPITAL ENCOUNTER (OUTPATIENT)
Dept: PHYSICAL THERAPY | Age: 70
Setting detail: THERAPIES SERIES
Discharge: HOME | End: 2020-08-17
Payer: MEDICARE

## 2020-08-17 PROCEDURE — 97110 THERAPEUTIC EXERCISES: CPT

## 2020-08-17 NOTE — PROGRESS NOTES
[address] [address] [address] [place]      Phone: [phone]  Fax: [phone]    Physical Therapy Daily Treatment Note  Date:  2020    Patient Name:  Josphine Habermann    :  1950  MRN: [id number]    Restrictions/Precautions:    Diagnosis:  S/p left shoulder arthroscopic surgery, rotator cuff repair  Treatment Diagnosis:    Insurance/Certification information:  [facility]  Referring Physician:  FANNY Rondon  Plan of care signed (Y/N):    Visit# / total visits:   (19 visits approved by insurance)  Pain level: 4/10   Time In:  857  Time Out:  1024    Subjective:  Pt had nothing new to report.   Stated, \" I feel pretty good\"     Exercises:  Exercise/Equipment Resistance/Repetitions Other comments     Pulleys  5 minutes      Wall ladder Flex and abd x 15 reps each      UBE 5 min fwd/ 5 min bwd      Wand shoulder flex and ER x15 reps each, supine      IR stretch with towel 15 sec x 5 reps      Sidelying shoulder abd and ER 1#  x15 reps ABD, 15 reps ER      Scapular retraction 5 sec x 10 reps             Shoulder stabilization w/ ball CW,CCW, up/down, side<> side x 10 reps each    Standing bent: row, ext , flex and ABD 1 # X 10 reps each Tolerated well   Front / lateral raises  X 10 reps each    rows OTB x 10 reps     IR/ER  OTB x 10 reps          2020         L sh        flex 155°                     °                     IR 70 °                     ER 57°          MMT  L sh 3+/5 throughout                  Elbow 4+/5      Other  8/10/20 pt can actively perform L sh flex and ABD against gravity without compensatory movements     Home Exercise Program:  20 - wand shoulder flex and ER, wall slides, scapular retraction    Manual Treatments:  N/A    Modalities:  HP to  L shoulder x 15 mins    Timed Code Treatment Minutes   50    Total Treatment Minutes: 65    Treatment/Activity Tolerance:  [x] Patient tolerated treatment well [] Patient limited by fatigue  [] Patient limited by pain  [] Patient limited by other medical complications  [] Other:     Prognosis: [x] Good [] Fair  [] Poor    Patient Requires Follow-up: [x] Yes  [] No    Plan:   [x] Continue per plan of care [] Alter current plan (see comments)  [] Plan of care initiated [] Hold pending MD visit [] Discharge  Plan for Next Session:        Electronically signed by:  Nina Lind, PTA [id number]

## 2020-08-20 ENCOUNTER — HOSPITAL ENCOUNTER (OUTPATIENT)
Dept: PHYSICAL THERAPY | Age: 70
Setting detail: THERAPIES SERIES
Discharge: HOME | End: 2020-08-20
Payer: MEDICARE

## 2020-08-20 PROCEDURE — 97110 THERAPEUTIC EXERCISES: CPT

## 2020-08-20 NOTE — PROGRESS NOTES
[address] [facility]                  Phone: [phone]  Fax: [phone]    Physical Therapy Daily Treatment Note  Date:  2020    Patient Name:  Viji Clarke    :  1950  MRN: [id number]    Restrictions/Precautions:    Diagnosis:  S/p left shoulder arthroscopic surgery, rotator cuff repair  Treatment Diagnosis:    Insurance/Certification information:  [employer]  Referring Physician:  FANNY Graves  Plan of care signed (Y/N):    Visit# / total visits:   (19 visits approved by insurance)  Pain level: 3-4/10   Time In:  926  Time Out:  1035    Subjective:  Pt had nothing new to report.      Exercises:  Exercise/Equipment Resistance/Repetitions Other comments     Pulleys  5 minutes      Wall ladder Flex and abd x 15 reps each      UBE 5 min fwd/ 5 min bwd      Wand shoulder flex and ER 2 # x15 reps each, supine      IR stretch with towel 15 sec x 5 reps      Sidelying shoulder abd and ER 1#  x15 reps ABD, 15 reps ER      Scapular retraction 5 sec x 10 reps             Shoulder stabilization w/ ball CW,CCW, up/down, side<> side x 10 reps each    Standing bent: row, ext , flex and ABD 1 # X 10 reps each Tolerated well   Front / lateral raises  1 # X 10 reps flex, 1# x 5 reps ABD    rows OTB x 10 reps     IR/ER  OTB x 10 reps          2020         L sh        flex 155°                     °                     IR 70 °                     ER 57°          MMT  L sh 3+/5 throughout                  Elbow 4+/5      Other  8/10/20 pt can actively perform L sh flex and ABD against gravity without compensatory movements     Home Exercise Program:  20 - wand shoulder flex and ER, wall slides, scapular retraction    Manual Treatments:  N/A    Modalities:  HP to  L shoulder x 15 mins    Timed Code Treatment Minutes   54  Total Treatment Minutes: 69    Treatment/Activity Tolerance:  [x] Patient tolerated treatment well [] Patient limited by fatigue  [] Patient limited by pain  [] Patient limited by other medical complications  [] Other:     Prognosis: [x] Good [] Fair  [] Poor    Patient Requires Follow-up: [x] Yes  [] No    Plan:   [x] Continue per plan of care [] Alter current plan (see comments)  [] Plan of care initiated [] Hold pending MD visit [] Discharge  Plan for Next Session:        Electronically signed by:  Glen Mariee, PTA [id number]

## 2020-08-24 ENCOUNTER — HOSPITAL ENCOUNTER (OUTPATIENT)
Dept: PHYSICAL THERAPY | Age: 70
Setting detail: THERAPIES SERIES
Discharge: HOME | End: 2020-08-24
Payer: MEDICARE

## 2020-08-24 PROCEDURE — 97110 THERAPEUTIC EXERCISES: CPT

## 2020-08-24 NOTE — PROGRESS NOTES
[address] [facility]                  Phone: [phone]  Fax: [phone]    Physical Therapy Daily Treatment Note  Date:  2020    Patient Name:  Lori Tripathi    :  1950  MRN: [id number]    Restrictions/Precautions:    Diagnosis:  S/p left shoulder arthroscopic surgery, rotator cuff repair  Treatment Diagnosis:    Insurance/Certification information:  [employer]  Referring Physician:  FANNY Fox  Plan of care signed (Y/N):    Visit# / total visits:   (19 visits approved by insurance)  Pain level: 3/10   Time In:  930  Time Out:  1038  Subjective:  Pt had nothing new to report.      Exercises:  Exercise/Equipment Resistance/Repetitions Other comments     Pulleys  5 minutes      Wall ladder Flex and abd x 15 reps each      UBE 5 min fwd/ 5 min bwd      Wand shoulder flex and ER 2 # x15 reps each, supine      IR stretch with towel 15 sec x 5 reps      Sidelying shoulder abd and ER 1#  x15 reps ABD, 15 reps ER      Scapular retraction 5 sec x 10 reps             Shoulder stabilization w/ ball CW,CCW, up/down, side<> side x 10 reps each    Standing bent: row, ext , flex and ABD 1 # X 10 reps each Tolerated well   Front / lateral raises  1 # X 10 reps flex, 1# x 5 reps ABD    rows OTB x 10 reps     IR/ER  OTB x 10 reps     L sh ABD to 90° YTB  X 10 reps          2020         L sh        flex 165°                     °                     IR 70 °                     ER 60°          MMT  L sh Flex 4+/5,  3+/5 ABD                  Elbow 5/5      Other  8/10/20 pt can actively perform L sh flex and ABD against gravity without compensatory movements     Home Exercise Program:  20 - wand shoulder flex and ER, wall slides, scapular retraction    Manual Treatments:  N/A    Modalities:  HP to  L shoulder x 15 mins    Timed Code Treatment Minutes   53  Total Treatment Minutes: 68    Treatment/Activity Tolerance:  [x] Patient tolerated treatment well [] Patient limited by fatigue  [] Patient limited by pain  [] Patient limited by other medical complications  [] Other:     Prognosis: [x] Good [] Fair  [] Poor    Patient Requires Follow-up: [x] Yes  [] No    Plan:   [x] Continue per plan of care [] Alter current plan (see comments)  [] Plan of care initiated [] Hold pending MD visit [] Discharge  Plan for Next Session:        Electronically signed by:  Flower Reagan, PTA [id number]

## 2020-08-27 ENCOUNTER — HOSPITAL ENCOUNTER (OUTPATIENT)
Dept: PHYSICAL THERAPY | Age: 70
Setting detail: THERAPIES SERIES
Discharge: HOME | End: 2020-08-27
Payer: MEDICARE

## 2020-08-27 PROCEDURE — 97110 THERAPEUTIC EXERCISES: CPT

## 2020-08-31 ENCOUNTER — HOSPITAL ENCOUNTER (OUTPATIENT)
Dept: PHYSICAL THERAPY | Age: 70
Setting detail: THERAPIES SERIES
Discharge: HOME | End: 2020-08-31
Payer: MEDICARE

## 2020-08-31 PROCEDURE — 97110 THERAPEUTIC EXERCISES: CPT

## 2020-08-31 NOTE — PROGRESS NOTES
[address] [facility]                  Phone: [phone]  Fax: [phone]    Physical Therapy Daily Treatment Note  Date:  2020    Patient Name:  Yan Hayes    :  1950  MRN: [id number]    Restrictions/Precautions:    Diagnosis:  S/p left shoulder arthroscopic surgery, rotator cuff repair  Treatment Diagnosis:    Insurance/Certification information:  [employer]  Referring Physician:  FANNY Howard  Plan of care signed (Y/N):    Visit# / total visits:   (19 visits completed ) (9 visits approved by insurance)  Pain level: 2/10   Time In:  930  Time Out:  1045    Subjective:  Pt had nothing new to report.      Exercises:  Exercise/Equipment Resistance/Repetitions Other comments     Pulleys  5 minutes      Wall ladder Flex and abd x 15 reps each      UBE 5 min fwd/ 5 min bwd Add resistance next session      Wand shoulder flex and ER 2 # x15 reps each, supine      IR stretch with towel 15 sec x 5 reps      Sidelying shoulder abd and ER 1#  x15 reps ABD,  2 # x 15 reps ER      Scapular retraction 5 sec x 10 reps             Shoulder stabilization w/ ball CW,CCW, up/down, side<> side x 10 reps each    Standing bent: row, ext , flex and ABD 1 # X 10 reps each    Front / lateral raises  1 # X 10 reps flex, 1# x 5 reps ABD    rows OTB 2 x 10 reps     IR/ER  OTB 2 x 10 reps     L sh ABD to 90° YTB  X 10 reps  NT        2020         L sh        flex 165°                     °                     IR 70 °                     ER 60°          MMT  L sh Flex 4+/5,  3+/5 ABD                  Elbow 5/5      Other  8/10/20 pt can actively perform L sh flex and ABD against gravity without compensatory movements     Home Exercise Program:  20 - wand shoulder flex and ER, wall slides, scapular retraction    Manual Treatments:  N/A    Modalities:  HP to  L shoulder x 15 mins    Timed Code Treatment Minutes   60  Total Treatment Minutes: 75    Treatment/Activity Tolerance:  [x]

## 2020-09-03 ENCOUNTER — HOSPITAL ENCOUNTER (OUTPATIENT)
Dept: PHYSICAL THERAPY | Age: 70
Setting detail: THERAPIES SERIES
Discharge: HOME | End: 2020-09-03
Payer: MEDICARE

## 2020-09-03 PROCEDURE — 97110 THERAPEUTIC EXERCISES: CPT

## 2020-09-03 NOTE — PROGRESS NOTES
[address] [facility]                  Phone: [phone]  Fax: [phone]    Physical Therapy Daily Treatment Note  Date:  9/3/2020    Patient Name:  Vince Deluca    :  1950  MRN: [id number]    Restrictions/Precautions:    Diagnosis:  S/p left shoulder arthroscopic surgery, rotator cuff repair  Treatment Diagnosis:    Insurance/Certification information:  [facility]  Referring Physician:  FANNY Green  Plan of care signed (Y/N):    Visit# / total visits:  3/9 (19 visits completed ) (9 visits approved by insurance)  Pain level: 2/10   Time In:  929  Time Out:  1045    Subjective:  Pt asked to end her therapy sessions at this time d/t insurance issues. Exercises:  Exercise/Equipment Resistance/Repetitions Other comments     Pulleys  5 minutes      Wall ladder Flex and abd x 15 reps each      UBE 5 min fwd/ 5 min bwd      Wand shoulder flex and ER 2 # x15 reps each, supine      IR stretch with towel 15 sec x 5 reps      Sidelying shoulder abd and ER 1#  x15 reps ABD,  2 # x 15 reps ER      Scapular retraction 5 sec x 10 reps             Shoulder stabilization w/ ball CW,CCW, up/down, side<> side x 10 reps each    Standing bent: row, ext , flex and ABD 1 # X 10 reps each    Front / lateral raises  1 # X 10 reps flex, 1# x 5 reps ABD    rows OTB 2 x 10 reps     IR/ER  OTB 2 x 10 reps     L sh ABD to 90° YTB  X 10 reps          9/3/2020         L sh        flex 165°                     ° Pt reported \"tightness \"                    IR 70 °                     ER 60°          MMT  L sh Flex 4+/5,  3+/5 ABD                  Elbow 5/5      Other  8/10/20 pt can actively perform L sh flex and ABD against gravity without compensatory movements     Home Exercise Program:  20 - wand shoulder flex and ER, wall slides, scapular retraction. 9/3/2020: pt has a comprehensive HEP, and YTB, OTB,GTB for progressions.   Pt demonstrated understanding of exercises to therapist. Manual Treatments:  N/A    Modalities:  HP to  L shoulder x 15 mins    Timed Code Treatment Minutes   61  Total Treatment Minutes: 76    Treatment/Activity Tolerance:  [x] Patient tolerated treatment well [] Patient limited by fatigue  [] Patient limited by pain  [] Patient limited by other medical complications  [] Other:     Prognosis: [x] Good [] Fair  [] Poor    Patient Requires Follow-up: [x] Yes  [] No    Plan:   [] Continue per plan of care [] Alter current plan (see comments)  [] Plan of care initiated [] Hold pending MD visit [x] Discharge  Plan for Next Session:        Electronically signed by:  Jaylin Rosas, PTA [id number]

## 2020-09-03 NOTE — DISCHARGE SUMMARY
[address] [facility]                 Phone: [phone]  Fax: [phone]    Physical Therapy  Out Patient Discharge Summary     Date:  9/3/2020    Patient Name:  Josefa Preston    :  1950  MRN: [id number]    DIAGNOSIS:  S/p left shoulder arthroscopic surgery, rotator cuff repair  REFERRING PHYSICIAN:  FANNY Portillo    ATTENDANCE:  Pt has attended 22 of 22 scheduled treatments from 20 to 9/3/20. TREATMENTS RECEIVED:  ROM, stretching, strength training, education in a HEP, HP    INITIAL STATUS:  · Left shoulder AROM: flex 110°, abd 72°, IR 50°, ER 45°  · Strength left shoulder 3/5, elbow 4/5  · Limited functional use of L UE for daily activities due to recent surgery    FINAL STATUS:  · Left shoulder ROM: flex 165°, abd 140°, IR 70°, ER 60°  · Strength left shoulder 4+/5 except abd 3+/5, elbow 5/5  · Pt is able to complete all functional and daily activities using the L UE without difficulty  · Pt is independent with HEP    GOALS:  3 out of 4 Long Term Goals were obtained. LONG TERM GOALS NOT OBTAINED/REASON:  Pt demonstrates some residual tightness in the shoulder, but all movements are functional.  Strength goal met except some continued weakness noted with abduction. PATIENT GOALS:  To return to prior level of function with L UE    REASON FOR DISCHARGE:  Per pt request due to insurance issues. PATIENT EDUCATION/INSTRUCTIONS:  Pt provided with an extensive HEP of stretching and strength training activities. Pt provided with tbands of various levels of resistance for HEP. RECOMMENDATIONS:  Discharge to HEP        Thank you for the opportunity to work with your patient. If you have questions or comments, please feel free to contact me by phone or fax.       Electronically Signed by: Deb Nails, PT [id number]  9/3/2020

## 2020-09-11 ENCOUNTER — OFFICE VISIT (OUTPATIENT)
Dept: ORTHOPEDIC SURGERY | Age: 70
End: 2020-09-11
Payer: MEDICARE

## 2020-09-11 VITALS — BODY MASS INDEX: 29.16 KG/M2 | HEIGHT: 65 IN | TEMPERATURE: 97 F | WEIGHT: 175 LBS

## 2020-09-11 PROCEDURE — 99213 OFFICE O/P EST LOW 20 MIN: CPT | Performed by: ORTHOPAEDIC SURGERY

## 2020-09-11 NOTE — PROGRESS NOTES
and agree with the assessment and plan as documented by Angus Santos CNP. I have performed the key components of the history and physical examination and concur with the findings and plan, and have made changes where appropriate/necessary.        Niya Leo MD  [facility]

## 2020-10-12 RX ORDER — EVOLOCUMAB 140 MG/ML
140 INJECTION, SOLUTION SUBCUTANEOUS
Qty: 2.1 ML | Refills: 3 | Status: SHIPPED
Start: 2020-10-12 | End: 2021-01-25 | Stop reason: SDUPTHER

## 2020-11-12 ENCOUNTER — HOSPITAL ENCOUNTER (OUTPATIENT)
Dept: GENERAL RADIOLOGY | Age: 70
Discharge: HOME | End: 2020-11-14
Payer: MEDICARE

## 2020-11-12 PROCEDURE — 77063 BREAST TOMOSYNTHESIS BI: CPT

## 2020-12-11 ENCOUNTER — OFFICE VISIT (OUTPATIENT)
Dept: ORTHOPEDIC SURGERY | Age: 70
End: 2020-12-11
Payer: MEDICARE

## 2020-12-11 VITALS — HEIGHT: 65 IN | BODY MASS INDEX: 29.16 KG/M2 | TEMPERATURE: 96.8 F | WEIGHT: 175 LBS

## 2020-12-11 PROCEDURE — 99213 OFFICE O/P EST LOW 20 MIN: CPT | Performed by: ORTHOPAEDIC SURGERY

## 2020-12-11 NOTE — PROGRESS NOTES
Follow Up Post Operative Visit     Surgery: Left shoulder arthroscopy, rotator cuff repair (subscapularis single anchor), biceps tenodesis, subacromial decompression with acromioplasty   Date: 5/21/2020    Subjective:    Jarod Mclaughlin is here for follow up visit s/p above procedure. She is doing well. She has been back to all normal activities. She is having no pain    Controlled Substances Monitoring:        Physical Exam:    Height: 5' 5\" (1.651 m), Weight: 175 lb (79.4 kg)    General: Alert and oriented x3, no acute distress  Cardiovascular/pulmonary: No labored breathing, peripheral perfusion intact  Musculoskeletal:    Exam of the shoulder shows full range of motion. There is no pain. Rotator cuff strength testing is intact. Biceps contour is normal       Imaging: No new imaging    Assessment and Plan: 6 months out from left shoulder rotator cuff repair biceps tenodesis  She is doing well. She will continue normal activities without restrictions.   Follow-up as needed    Perla Glass MD  Orthopaedic Surgery   12/11/20  10:08 AM

## 2021-01-25 RX ORDER — EVOLOCUMAB 140 MG/ML
140 INJECTION, SOLUTION SUBCUTANEOUS
Qty: 2.1 ML | Refills: 3 | Status: SHIPPED
Start: 2021-01-25 | End: 2021-05-18 | Stop reason: SDUPTHER

## 2021-02-03 ENCOUNTER — OFFICE VISIT (OUTPATIENT)
Dept: PRIMARY CARE CLINIC | Age: 71
End: 2021-02-03
Payer: MEDICARE

## 2021-02-03 VITALS
HEIGHT: 65 IN | BODY MASS INDEX: 29.82 KG/M2 | SYSTOLIC BLOOD PRESSURE: 116 MMHG | DIASTOLIC BLOOD PRESSURE: 70 MMHG | OXYGEN SATURATION: 96 % | WEIGHT: 179 LBS | TEMPERATURE: 96.7 F | HEART RATE: 65 BPM

## 2021-02-03 DIAGNOSIS — M75.82 TENDINITIS OF LEFT ROTATOR CUFF: ICD-10-CM

## 2021-02-03 DIAGNOSIS — M19.049 HAND ARTHRITIS: Primary | ICD-10-CM

## 2021-02-03 DIAGNOSIS — K44.9 HIATAL HERNIA: ICD-10-CM

## 2021-02-03 DIAGNOSIS — F41.9 ANXIETY: ICD-10-CM

## 2021-02-03 DIAGNOSIS — E78.00 HYPERCHOLESTEROLEMIA: ICD-10-CM

## 2021-02-03 DIAGNOSIS — M54.2 NECK PAIN: ICD-10-CM

## 2021-02-03 LAB
ALBUMIN SERPL-MCNC: 4.4 G/DL (ref 3.5–5.2)
ALP BLD-CCNC: 58 U/L (ref 35–104)
ALT SERPL-CCNC: 26 U/L (ref 0–32)
ANION GAP SERPL CALCULATED.3IONS-SCNC: 11 MMOL/L (ref 7–16)
AST SERPL-CCNC: 28 U/L (ref 0–31)
BILIRUB SERPL-MCNC: 0.5 MG/DL (ref 0–1.2)
BUN BLDV-MCNC: 18 MG/DL (ref 8–23)
CALCIUM SERPL-MCNC: 9.8 MG/DL (ref 8.6–10.2)
CHLORIDE BLD-SCNC: 103 MMOL/L (ref 98–107)
CHOLESTEROL, TOTAL: 147 MG/DL (ref 0–199)
CO2: 26 MMOL/L (ref 22–29)
CREAT SERPL-MCNC: 0.7 MG/DL (ref 0.5–1)
GFR AFRICAN AMERICAN: >60
GFR NON-AFRICAN AMERICAN: >60 ML/MIN/1.73
GLUCOSE BLD-MCNC: 96 MG/DL (ref 74–99)
HCT VFR BLD CALC: 44 % (ref 34–48)
HDLC SERPL-MCNC: 48 MG/DL
HEMOGLOBIN: 13.4 G/DL (ref 11.5–15.5)
LDL CHOLESTEROL CALCULATED: 46 MG/DL (ref 0–99)
MCH RBC QN AUTO: 28.9 PG (ref 26–35)
MCHC RBC AUTO-ENTMCNC: 30.5 % (ref 32–34.5)
MCV RBC AUTO: 95 FL (ref 80–99.9)
PDW BLD-RTO: 13.7 FL (ref 11.5–15)
PLATELET # BLD: 256 E9/L (ref 130–450)
PMV BLD AUTO: 11.6 FL (ref 7–12)
POTASSIUM SERPL-SCNC: 4.8 MMOL/L (ref 3.5–5)
RBC # BLD: 4.63 E12/L (ref 3.5–5.5)
SODIUM BLD-SCNC: 140 MMOL/L (ref 132–146)
TOTAL PROTEIN: 7.4 G/DL (ref 6.4–8.3)
TRIGL SERPL-MCNC: 263 MG/DL (ref 0–149)
VLDLC SERPL CALC-MCNC: 53 MG/DL
WBC # BLD: 5.7 E9/L (ref 4.5–11.5)

## 2021-02-03 PROCEDURE — 99214 OFFICE O/P EST MOD 30 MIN: CPT | Performed by: INTERNAL MEDICINE

## 2021-02-03 RX ORDER — CITALOPRAM 20 MG/1
20 TABLET ORAL DAILY
Qty: 90 TABLET | Refills: 1 | Status: SHIPPED
Start: 2021-02-03 | End: 2021-08-04

## 2021-02-03 RX ORDER — PANTOPRAZOLE SODIUM 40 MG/1
40 TABLET, DELAYED RELEASE ORAL
Qty: 30 TABLET | Refills: 0 | Status: SHIPPED
Start: 2021-02-03 | End: 2021-03-04 | Stop reason: SDUPTHER

## 2021-02-03 ASSESSMENT — PATIENT HEALTH QUESTIONNAIRE - PHQ9
SUM OF ALL RESPONSES TO PHQ QUESTIONS 1-9: 0
SUM OF ALL RESPONSES TO PHQ QUESTIONS 1-9: 0
1. LITTLE INTEREST OR PLEASURE IN DOING THINGS: 0
SUM OF ALL RESPONSES TO PHQ9 QUESTIONS 1 & 2: 0

## 2021-02-03 NOTE — PROGRESS NOTES
2/3/21    Nicholas Wang, a female of 69 y.o. came to the office     Nicholas Wang presents today 6-month follow-up. Since her last follow-up she has seen orthopedic surgery. She has been having a lot of problems with indigestion. She has a history of a hiatal hernia. She is still taking omeprazole every morning. She did get her shingles vaccine and flu shot. She has been following up with ob-gyn as well. She has been having some sinus congestion and fullness in her ears. Patient Active Problem List   Diagnosis    Chest pain    Hyperlipidemia LDL goal <100    Acute cystitis    Chronic left shoulder pain      Allergies   Allergen Reactions    Daypro [Oxaprozin] Itching    Demerol Hcl [Meperidine]     Levaquin [Levofloxacin In D5w]     Tape [Adhesive Tape] Itching     Ok to use paper tape per pt    Valium [Diazepam]      Current Outpatient Medications on File Prior to Visit   Medication Sig Dispense Refill    Evolocumab (REPATHA) 140 MG/ML SOSY Inject 1 mL into the skin every 14 days 2.1 mL 3    diclofenac (VOLTAREN) 50 MG EC tablet TAKE ONE TABLET BY MOUTH THREE TIMES A DAY WITH MEALS 270 tablet 1    aspirin 81 MG tablet Take 81 mg by mouth daily Hold/Dr Alycia Boyer Cholecalciferol (VITAMIN D3) 2000 UNITS CAPS Take 2,000 Units by mouth 2 times daily      Multiple Vitamins-Minerals (MULTIVITAMIN & MINERAL PO) Take 1 tablet by mouth daily      calcium carbonate 600 MG TABS tablet Take 1 tablet by mouth daily      omeprazole (PRILOSEC) 20 MG capsule Take 20 mg by mouth every morning Instructed to take with sip water am of procedure      Omega-3 Fatty Acids (FISH OIL) 1000 MG CAPS Take 2,000 mg by mouth 2 times daily       sodium chloride (OCEAN, BABY AYR) 0.65 % nasal spray 1 spray by Nasal route as needed for Congestion       No current facility-administered medications on file prior to visit.       Review of Systems  Constitutional:Negative for activity change, appetite change, chills, consider repeat endoscopy     L shoulder improving, now having some right shoulder issues    Will repeat BW today    Start on flonase, allegra    Exercises for right shoulder    Please note that the above documentation was prepared using voice recognition software. Every attempt was made to ensure accuracy but there may be spelling, grammatical, and contextual errors. Electronically signed by Berta Stephens DO on 2/3/2021 at 10:03 AM        Return in about 6 months (around 8/3/2021).     Berta Stephens DO

## 2021-03-04 DIAGNOSIS — K44.9 HIATAL HERNIA: ICD-10-CM

## 2021-03-04 RX ORDER — PANTOPRAZOLE SODIUM 40 MG/1
40 TABLET, DELAYED RELEASE ORAL
Qty: 90 TABLET | Refills: 1 | Status: SHIPPED
Start: 2021-03-04 | End: 2021-08-26 | Stop reason: SDUPTHER

## 2021-03-19 ENCOUNTER — OFFICE VISIT (OUTPATIENT)
Dept: PRIMARY CARE CLINIC | Age: 71
End: 2021-03-19
Payer: MEDICARE

## 2021-03-19 VITALS
OXYGEN SATURATION: 99 % | SYSTOLIC BLOOD PRESSURE: 122 MMHG | BODY MASS INDEX: 30.29 KG/M2 | TEMPERATURE: 98 F | WEIGHT: 182 LBS | HEART RATE: 71 BPM | DIASTOLIC BLOOD PRESSURE: 78 MMHG

## 2021-03-19 DIAGNOSIS — K13.0 ANGULAR CHEILITIS: ICD-10-CM

## 2021-03-19 DIAGNOSIS — R21 FACIAL RASH: Primary | ICD-10-CM

## 2021-03-19 PROCEDURE — 99213 OFFICE O/P EST LOW 20 MIN: CPT | Performed by: INTERNAL MEDICINE

## 2021-03-19 NOTE — PROGRESS NOTES
3/19/21    Adam Milton, a female of 69 y.o. came to the office     Adam Milton presents today for evaluation of a rash on her mouth. She has a burning sensation of the roof of her mouth and a rash on her lip. This started 3 weeks ago. The burning in her mouth has been present for a long time. The area is not spreading. Patient Active Problem List   Diagnosis    Chest pain    Hyperlipidemia LDL goal <100    Acute cystitis    Chronic left shoulder pain      Allergies   Allergen Reactions    Daypro [Oxaprozin] Itching    Demerol Hcl [Meperidine]     Levaquin [Levofloxacin In D5w]     Tape [Adhesive Tape] Itching     Ok to use paper tape per pt    Valium [Diazepam]      Current Outpatient Medications on File Prior to Visit   Medication Sig Dispense Refill    Fexofenadine HCl (ALLEGRA ALLERGY PO) Take by mouth      pantoprazole (PROTONIX) 40 MG tablet Take 1 tablet by mouth every morning (before breakfast) 90 tablet 1    citalopram (CELEXA) 20 MG tablet Take 1 tablet by mouth daily 90 tablet 1    Evolocumab (REPATHA) 140 MG/ML SOSY Inject 1 mL into the skin every 14 days 2.1 mL 3    diclofenac (VOLTAREN) 50 MG EC tablet TAKE ONE TABLET BY MOUTH THREE TIMES A DAY WITH MEALS 270 tablet 1    sodium chloride (OCEAN, BABY AYR) 0.65 % nasal spray 1 spray by Nasal route as needed for Congestion      aspirin 81 MG tablet Take 81 mg by mouth daily Hold/Dr Baron Shook Cholecalciferol (VITAMIN D3) 2000 UNITS CAPS Take 2,000 Units by mouth 2 times daily      Multiple Vitamins-Minerals (MULTIVITAMIN & MINERAL PO) Take 1 tablet by mouth daily      calcium carbonate 600 MG TABS tablet Take 1 tablet by mouth daily      Omega-3 Fatty Acids (FISH OIL) 1000 MG CAPS Take 2,000 mg by mouth 2 times daily        No current facility-administered medications on file prior to visit. Review of Systems  Constitutional:Negative for activity change, appetite change, chills, fatigue and fever.    Respiratory: Negative for choking, chest tightness, shortness of breath and wheezing. Cardiovascular: Negative for chest pain, palpitations and leg swelling. Gastrointestinal: Negative for abdominal distention, constipation, diarrhea, nausea and vomiting. Musculoskeletal: Negative for arthralgias, back pain, gait problem and joint swelling. Neurological: Negative for dizziness, weakness,numbness and headaches. /78   Pulse 71   Temp 98 °F (36.7 °C)   Wt 182 lb (82.6 kg)   SpO2 99%   BMI 30.29 kg/m²      Physical Exam   Constitutional:  Oriented to person, place, and time. Appears well-developed and well-nourished. No acute distress. HENT: No sinus tenderness or lymphadenopathy  Head: Normocephalic and atraumatic. Eyes: Eyes exhibits no discharge. No scleral icterus present. Neck: No tracheal deviation present. No thyromegaly present. Cardiovascular: Normal rate, regular rhythm, normal heart sounds and intact distal pulses. Exam reveals no gallop nor friction rub. No murmur heard. Pulmonary: Effort normal and breath sounds normal. No respiratory distress. No wheezes or rales. Abdomen: No signs of rigidity rebound or organomegaly  Musculoskeletal:  No tenderness to palpation  Neurological:Alert and oriented to person, place, and time. Skin: No diaphoresis. Psychiatric: Normal mood and affect. Behavior is Normal.     ASSESSMENT AND PLAN:    Julio Feldman was seen today for rash and other. Diagnoses and all orders for this visit:    Facial rash  -     Clotrimazole 1 % OINT; Apply 1 Applicatorful topically 2 times daily    Angular cheilitis  -     Clotrimazole 1 % OINT;  Apply 1 Applicatorful topically 2 times daily        Treated with clotrimazole for angular cheilitis      We discussed her mouth burning, advised her to start using soft bristle toothbrushes along with Sensodyne    If her symptoms fail to improve she may benefit from a trial of Neurontin      She has followed up with her dentist for many years for this phenomenon        Electronically signed by Angelica Steiner DO on 3/19/2021 at 2:55 PM      Please note that the above documentation was prepared using voice recognition software. Every attempt was made to ensure accuracy but there may be spelling, grammatical, and contextual errors. Electronically signed by Angelica Steiner DO on 3/19/2021 at 2:55 PM          Return if symptoms worsen or fail to improve.     Angelica Steiner DO

## 2021-03-22 ENCOUNTER — TELEPHONE (OUTPATIENT)
Dept: PRIMARY CARE CLINIC | Age: 71
End: 2021-03-22

## 2021-03-22 DIAGNOSIS — R21 FACIAL RASH: ICD-10-CM

## 2021-03-22 DIAGNOSIS — K13.0 ANGULAR CHEILITIS: ICD-10-CM

## 2021-03-26 DIAGNOSIS — M19.049 HAND ARTHRITIS: ICD-10-CM

## 2021-05-18 RX ORDER — EVOLOCUMAB 140 MG/ML
140 INJECTION, SOLUTION SUBCUTANEOUS
Qty: 2.1 ML | Refills: 3 | Status: SHIPPED
Start: 2021-05-18 | End: 2021-08-05 | Stop reason: SDUPTHER

## 2021-08-04 DIAGNOSIS — M54.2 NECK PAIN: ICD-10-CM

## 2021-08-04 DIAGNOSIS — F41.9 ANXIETY: ICD-10-CM

## 2021-08-04 RX ORDER — CITALOPRAM 20 MG/1
TABLET ORAL
Qty: 90 TABLET | Refills: 0 | Status: SHIPPED
Start: 2021-08-04 | End: 2021-08-05 | Stop reason: SDUPTHER

## 2021-08-05 ENCOUNTER — OFFICE VISIT (OUTPATIENT)
Dept: PRIMARY CARE CLINIC | Age: 71
End: 2021-08-05
Payer: MEDICARE

## 2021-08-05 VITALS
HEART RATE: 67 BPM | TEMPERATURE: 97.2 F | WEIGHT: 176.5 LBS | OXYGEN SATURATION: 96 % | HEIGHT: 65 IN | BODY MASS INDEX: 29.41 KG/M2 | DIASTOLIC BLOOD PRESSURE: 80 MMHG | SYSTOLIC BLOOD PRESSURE: 130 MMHG

## 2021-08-05 DIAGNOSIS — M19.049 HAND ARTHRITIS: ICD-10-CM

## 2021-08-05 DIAGNOSIS — F41.9 ANXIETY: ICD-10-CM

## 2021-08-05 DIAGNOSIS — Z11.59 NEED FOR HEPATITIS C SCREENING TEST: ICD-10-CM

## 2021-08-05 DIAGNOSIS — K44.9 HIATAL HERNIA: ICD-10-CM

## 2021-08-05 DIAGNOSIS — M54.2 NECK PAIN: ICD-10-CM

## 2021-08-05 DIAGNOSIS — Z00.00 ROUTINE GENERAL MEDICAL EXAMINATION AT A HEALTH CARE FACILITY: ICD-10-CM

## 2021-08-05 DIAGNOSIS — Z23 NEED FOR PNEUMOCOCCAL VACCINE: Primary | ICD-10-CM

## 2021-08-05 DIAGNOSIS — Z72.89 OTHER PROBLEMS RELATED TO LIFESTYLE: ICD-10-CM

## 2021-08-05 DIAGNOSIS — D17.9 LIPOMA, UNSPECIFIED SITE: ICD-10-CM

## 2021-08-05 DIAGNOSIS — Z23 NEED FOR PROPHYLACTIC VACCINATION AGAINST DIPHTHERIA-TETANUS-PERTUSSIS (DTP): ICD-10-CM

## 2021-08-05 PROCEDURE — G0439 PPPS, SUBSEQ VISIT: HCPCS | Performed by: INTERNAL MEDICINE

## 2021-08-05 RX ORDER — CITALOPRAM 20 MG/1
TABLET ORAL
Qty: 90 TABLET | Refills: 1 | Status: SHIPPED
Start: 2021-08-05 | End: 2022-04-28 | Stop reason: SDUPTHER

## 2021-08-05 RX ORDER — EVOLOCUMAB 140 MG/ML
140 INJECTION, SOLUTION SUBCUTANEOUS
Qty: 2.1 ML | Refills: 3 | Status: SHIPPED
Start: 2021-08-05 | End: 2021-12-14

## 2021-08-05 SDOH — ECONOMIC STABILITY: FOOD INSECURITY: WITHIN THE PAST 12 MONTHS, THE FOOD YOU BOUGHT JUST DIDN'T LAST AND YOU DIDN'T HAVE MONEY TO GET MORE.: NEVER TRUE

## 2021-08-05 SDOH — ECONOMIC STABILITY: FOOD INSECURITY: WITHIN THE PAST 12 MONTHS, YOU WORRIED THAT YOUR FOOD WOULD RUN OUT BEFORE YOU GOT MONEY TO BUY MORE.: NEVER TRUE

## 2021-08-05 ASSESSMENT — PATIENT HEALTH QUESTIONNAIRE - PHQ9
SUM OF ALL RESPONSES TO PHQ QUESTIONS 1-9: 0
1. LITTLE INTEREST OR PLEASURE IN DOING THINGS: 0
SUM OF ALL RESPONSES TO PHQ QUESTIONS 1-9: 0
1. LITTLE INTEREST OR PLEASURE IN DOING THINGS: 0
SUM OF ALL RESPONSES TO PHQ QUESTIONS 1-9: 0
SUM OF ALL RESPONSES TO PHQ9 QUESTIONS 1 & 2: 0
SUM OF ALL RESPONSES TO PHQ9 QUESTIONS 1 & 2: 0
2. FEELING DOWN, DEPRESSED OR HOPELESS: 0
SUM OF ALL RESPONSES TO PHQ QUESTIONS 1-9: 0
2. FEELING DOWN, DEPRESSED OR HOPELESS: 0

## 2021-08-05 ASSESSMENT — SOCIAL DETERMINANTS OF HEALTH (SDOH): HOW HARD IS IT FOR YOU TO PAY FOR THE VERY BASICS LIKE FOOD, HOUSING, MEDICAL CARE, AND HEATING?: NOT HARD AT ALL

## 2021-08-05 ASSESSMENT — LIFESTYLE VARIABLES: HOW OFTEN DO YOU HAVE A DRINK CONTAINING ALCOHOL: 0

## 2021-08-05 NOTE — PATIENT INSTRUCTIONS
Personalized Preventive Plan for [facility] - 8/5/2021  Medicare offers a range of preventive health benefits. Some of the tests and screenings are paid in full while other may be subject to a deductible, co-insurance, and/or copay. Some of these benefits include a comprehensive review of your medical history including lifestyle, illnesses that may run in your family, and various assessments and screenings as appropriate. After reviewing your medical record and screening and assessments performed today your provider may have ordered immunizations, labs, imaging, and/or referrals for you. A list of these orders (if applicable) as well as your Preventive Care list are included within your After Visit Summary for your review. Other Preventive Recommendations:    · A preventive eye exam performed by an eye specialist is recommended every 1-2 years to screen for glaucoma; cataracts, macular degeneration, and other eye disorders. · A preventive dental visit is recommended every 6 months. · Try to get at least 150 minutes of exercise per week or 10,000 steps per day on a pedometer . · Order or download the FREE \"Exercise & Physical Activity: Your Everyday Guide\" from [employer]. Call 6-331.594.7704 or search [employer] online. · You need 4155-2899 mg of calcium and 3795-9015 IU of vitamin D per day. It is possible to meet your calcium requirement with diet alone, but a vitamin D supplement is usually necessary to meet this goal.  · When exposed to the sun, use a sunscreen that protects against both UVA and UVB radiation with an SPF of 30 or greater. Reapply every 2 to 3 hours or after sweating, drying off with a towel, or swimming. · Always wear a seat belt when traveling in a car. Always wear a helmet when riding a bicycle or motorcycle.

## 2021-08-26 ENCOUNTER — OFFICE VISIT (OUTPATIENT)
Dept: SURGERY | Age: 71
End: 2021-08-26
Payer: MEDICARE

## 2021-08-26 ENCOUNTER — NURSE ONLY (OUTPATIENT)
Dept: PRIMARY CARE CLINIC | Age: 71
End: 2021-08-26
Payer: MEDICARE

## 2021-08-26 VITALS
OXYGEN SATURATION: 98 % | HEIGHT: 65 IN | HEART RATE: 61 BPM | WEIGHT: 180 LBS | BODY MASS INDEX: 29.99 KG/M2 | TEMPERATURE: 97.4 F | SYSTOLIC BLOOD PRESSURE: 126 MMHG | RESPIRATION RATE: 16 BRPM | DIASTOLIC BLOOD PRESSURE: 69 MMHG

## 2021-08-26 DIAGNOSIS — D17.1 LIPOMA OF TORSO: Primary | ICD-10-CM

## 2021-08-26 DIAGNOSIS — K44.9 HIATAL HERNIA: ICD-10-CM

## 2021-08-26 DIAGNOSIS — Z23 NEED FOR PNEUMOCOCCAL VACCINE: Primary | ICD-10-CM

## 2021-08-26 PROCEDURE — G0009 ADMIN PNEUMOCOCCAL VACCINE: HCPCS | Performed by: INTERNAL MEDICINE

## 2021-08-26 PROCEDURE — 90732 PPSV23 VACC 2 YRS+ SUBQ/IM: CPT | Performed by: INTERNAL MEDICINE

## 2021-08-26 PROCEDURE — 99202 OFFICE O/P NEW SF 15 MIN: CPT | Performed by: SURGERY

## 2021-08-26 RX ORDER — PANTOPRAZOLE SODIUM 40 MG/1
40 TABLET, DELAYED RELEASE ORAL
Qty: 90 TABLET | Refills: 1 | Status: SHIPPED
Start: 2021-08-26 | End: 2022-03-01

## 2021-08-29 NOTE — PROGRESS NOTES
[facility] Note    Assessment/Plan:      Diagnosis Orders   1. Lipoma of back      Discussed likely lipoma. She is comfortable with watching this for now as long as it does not interfere with her injections which I do not not think it should         Return if symptoms worsen or fail to improve. Chief Complaint   Patient presents with    New Patient     ref by Dr. Estephania Jose for lipoma, lower back under bra line and one on back of neck. PCP: Olga Eldridge DO    HPI: Tripp Guerra is a 69 y.o. female who presents in consultation here for evaluation of a mass on her back. She has had it for many years there is no significant size change lately. There is some occasional discomfort. There is no draining. There is no overlying skin changes. Her biggest concern is whether it will interfere with her epidural injection she is close to get her back. She says at this point she would like to just watch it if there is no issue with her injections. Discussed with her that without excision no definitive diagnosis can be given only suggested and she is aware that. Past Medical History:   Diagnosis Date    Arthritis     Hyperlipidemia     Right shoulder pain        Past Surgical History:   Procedure Laterality Date    COLONOSCOPY      EYE SURGERY Bilateral     Cataracts    HYSTERECTOMY      SHOULDER ARTHROSCOPY Right 9/5/2019    RIGHT SHOULDER ARTHROSCOPY ROTATOR CUFF REPAIR POSSIBLE BICEPS TENODESIS performed by Abdi Major MD at [address] [facility] ARTHROSCOPY Left 5/21/2020    LEFT SHOULDER ARTHROSCOPY, ROTATOR CUFF REPAIR BICEPS TENODESIS (ARTHREX, INTERSCALENE BLOCK) performed by Abdi Major MD at [address] [facility] URETEROSCOPY Left 09/14/2015       Prior to Admission medications    Medication Sig Start Date End Date Taking?  Authorizing Provider   diclofenac (VOLTAREN) 50 MG EC tablet TAKE ONE TABLET BY MOUTH THREE TIMES A DAY WITH MEALS 8/5/21  Yes Magen Buchanan DO   Evolocumab (REPATHA) 140 MG/ML SOSY Inject 1 mL into the skin every 14 days 8/5/21  Yes Rajesh Buchanan DO   citalopram (CELEXA) 20 MG tablet TAKE ONE TABLET BY MOUTH EVERY DAY 8/5/21  Yes Rajesh Buchanan DO   Clotrimazole 1 % OINT Apply 1 Applicatorful topically 2 times daily 3/22/21  Yes Rajesh Buchanan DO   Fexofenadine HCl (ALLEGRA ALLERGY PO) Take by mouth   Yes Historical Provider, MD   sodium chloride (OCEAN, BABY AYR) 0.65 % nasal spray 1 spray by Nasal route as needed for Congestion   Yes Historical Provider, MD   aspirin 81 MG tablet Take 81 mg by mouth daily Hold/Dr Sosa Hoover   Yes Historical Provider, MD   Cholecalciferol (VITAMIN D3) 2000 UNITS CAPS Take 2,000 Units by mouth 2 times daily   Yes Historical Provider, MD   Multiple Vitamins-Minerals (MULTIVITAMIN & MINERAL PO) Take 1 tablet by mouth daily   Yes Historical Provider, MD   calcium carbonate 600 MG TABS tablet Take 1 tablet by mouth daily   Yes Historical Provider, MD   Omega-3 Fatty Acids (FISH OIL) 1000 MG CAPS Take 2,000 mg by mouth 2 times daily    Yes Historical Provider, MD   pantoprazole (PROTONIX) 40 MG tablet Take 1 tablet by mouth every morning (before breakfast) 8/26/21   Reyes Quiver Economus,        Allergies   Allergen Reactions    Daypro [Oxaprozin] Itching    Demerol Hcl [Meperidine]     Levaquin [Levofloxacin In D5w]     Tape [Adhesive Tape] Itching     Ok to use paper tape per pt    Valium [Diazepam]        Social History     Socioeconomic History    Marital status:      Spouse name: None    Number of children: None    Years of education: None    Highest education level: None   Occupational History     Employer: NONE   Tobacco Use    Smoking status: Never Smoker    Smokeless tobacco: Never Used   Vaping Use    Vaping Use: Never used   Substance and Sexual Activity    Alcohol use: No    Drug use: No    Sexual activity: None   Other Topics Concern    None Social History Narrative    None     Social Determinants of Health     Financial Resource Strain: Low Risk     Difficulty of Paying Living Expenses: Not hard at all   Food Insecurity: No Food Insecurity    Worried About Running Out of Food in the Last Year: Never true    Marshal of Food in the Last Year: Never true   Transportation Needs:     Lack of Transportation (Medical):  Lack of Transportation (Non-Medical):    Physical Activity:     Days of Exercise per Week:     Minutes of Exercise per Session:    Stress:     Feeling of Stress :    Social Connections:     Frequency of Communication with Friends and Family:     Frequency of Social Gatherings with Friends and Family:     Attends [employer] Services:     Active Member of Clubs or Organizations:     Attends Club or Organization Meetings:     Marital Status:    Intimate Partner Violence:     Fear of Current or Ex-Partner:     Emotionally Abused:     Physically Abused:     Sexually Abused:        History reviewed. No pertinent family history. Review of Systems   All other systems reviewed and are negative. Objective:  Vitals:    08/26/21 1619   BP: 126/69   Pulse: 61   Resp: 16   Temp: 97.4 °F (36.3 °C)   TempSrc: Temporal   SpO2: 98%   Weight: 180 lb (81.6 kg)   Height: 5' 5\" (1.651 m)          Physical Exam  HENT:      Head: Normocephalic and atraumatic. Eyes:      General:         Right eye: No discharge. Left eye: No discharge. Neck:      Trachea: No tracheal deviation. Cardiovascular:      Rate and Rhythm: Normal rate. Pulmonary:      Effort: Pulmonary effort is normal. No respiratory distress. Abdominal:      General: There is no distension. Palpations: Abdomen is soft. Tenderness: There is no abdominal tenderness. There is no guarding or rebound. Skin:     General: Skin is warm and dry.       Comments: Overlying the right mid paraspinal approximately 3 to 4 cm soft fleshy subcutaneous mass likely lipoma. Neurological:      Mental Status: She is alert and oriented to person, place, and time. Winter Maxwell MD      NOTE: This report, in part or full,may have been transcribed using voice recognition software. Every effort was made to ensure accuracy; however, inadvertent computerized transcription errors may be present. Please excuse any transcriptional grammatical or spelling errors that may have escaped my editorial review.     CC: Mily Mo, DO

## 2021-11-11 DIAGNOSIS — M19.049 HAND ARTHRITIS: ICD-10-CM

## 2021-11-11 NOTE — TELEPHONE ENCOUNTER
----- Message from Magalis Bar sent at 11/10/2021  3:45 PM EST -----  Subject: Refill Request    QUESTIONS  Name of Medication? diclofenac (VOLTAREN) 50 MG EC tablet  Patient-reported dosage and instructions? 50 mg 3 times daily  How many days do you have left? 5  Preferred Pharmacy? [facility]  Pharmacy phone number (if available)? [phone]  ---------------------------------------------------------------------------  --------------  CALL BACK INFO  What is the best way for the office to contact you? OK to leave message on   voicemail  Preferred Call Back Phone Number?  [phone]

## 2021-12-14 RX ORDER — EVOLOCUMAB 140 MG/ML
INJECTION, SOLUTION SUBCUTANEOUS
Qty: 2 ML | Refills: 0 | Status: SHIPPED
Start: 2021-12-14 | End: 2022-01-27 | Stop reason: SDUPTHER

## 2022-01-06 ENCOUNTER — VIRTUAL VISIT (OUTPATIENT)
Dept: PRIMARY CARE CLINIC | Age: 72
End: 2022-01-06
Payer: MEDICARE

## 2022-01-06 DIAGNOSIS — J01.10 ACUTE FRONTAL SINUSITIS, RECURRENCE NOT SPECIFIED: Primary | ICD-10-CM

## 2022-01-06 PROCEDURE — 99442 PR PHYS/QHP TELEPHONE EVALUATION 11-20 MIN: CPT | Performed by: INTERNAL MEDICINE

## 2022-01-06 RX ORDER — AZITHROMYCIN 250 MG/1
250 TABLET, FILM COATED ORAL SEE ADMIN INSTRUCTIONS
Qty: 6 TABLET | Refills: 0 | Status: SHIPPED | OUTPATIENT
Start: 2022-01-06 | End: 2022-01-11

## 2022-01-06 RX ORDER — BROMPHENIRAMINE MALEATE, PSEUDOEPHEDRINE HYDROCHLORIDE, AND DEXTROMETHORPHAN HYDROBROMIDE 2; 30; 10 MG/5ML; MG/5ML; MG/5ML
5 SYRUP ORAL 4 TIMES DAILY PRN
Qty: 250 ML | Refills: 0 | Status: SHIPPED
Start: 2022-01-06 | End: 2022-03-01

## 2022-01-06 NOTE — PROGRESS NOTES
1/6/22    Feliciano Kumar, a female of 66 y.o. came to the office     Feliciano Kumar presents today for virtual visit for evaluation of URI symptoms. She has had symptoms for the past 5 days. She does have sinus congestion. She does have a cough. She does not have shortness of breath. She does not have wheezing. She does not have fevers or chills. She does not have otalgia. She does have a sore throat. She does have a headache. She denies any fevers chills or night sweats. Her Covid testing was negative.      Patient Active Problem List   Diagnosis    Chest pain    Hyperlipidemia LDL goal <100    Acute cystitis    Chronic left shoulder pain      Allergies   Allergen Reactions    Daypro [Oxaprozin] Itching    Demerol Hcl [Meperidine]     Levaquin [Levofloxacin In D5w]     Tape [Adhesive Tape] Itching     Ok to use paper tape per pt    Valium [Diazepam]      Current Outpatient Medications on File Prior to Visit   Medication Sig Dispense Refill    REPATHA 140 MG/ML SOSY INJECT 1ML INTO THE SKIN EVERY 14 DAYS 2 mL 0    diclofenac (VOLTAREN) 50 MG EC tablet TAKE ONE TABLET BY MOUTH THREE TIMES A DAY WITH MEALS 270 tablet 1    pantoprazole (PROTONIX) 40 MG tablet Take 1 tablet by mouth every morning (before breakfast) 90 tablet 1    citalopram (CELEXA) 20 MG tablet TAKE ONE TABLET BY MOUTH EVERY DAY 90 tablet 1    Clotrimazole 1 % OINT Apply 1 Applicatorful topically 2 times daily 1 Tube 0    Fexofenadine HCl (ALLEGRA ALLERGY PO) Take by mouth      sodium chloride (OCEAN, BABY AYR) 0.65 % nasal spray 1 spray by Nasal route as needed for Congestion      aspirin 81 MG tablet Take 81 mg by mouth daily Hold/Dr Carmona Many Cholecalciferol (VITAMIN D3) 2000 UNITS CAPS Take 2,000 Units by mouth 2 times daily      Multiple Vitamins-Minerals (MULTIVITAMIN & MINERAL PO) Take 1 tablet by mouth daily      calcium carbonate 600 MG TABS tablet Take 1 tablet by mouth daily      Omega-3 Fatty Acids (FISH OIL) 1000 MG CAPS Take 2,000 mg by mouth 2 times daily        No current facility-administered medications on file prior to visit. Review of Systems  Constitutional:Negative for activity change, appetite change, chills, fatigue and fever. Respiratory: Negative for choking, chest tightness, shortness of breath and wheezing. Cardiovascular: Negative for chest pain, palpitations and leg swelling. Gastrointestinal: Negative for abdominal distention, constipation, diarrhea, nausea and vomiting. Musculoskeletal: Negative for arthralgias, back pain, gait problem and joint swelling. Neurological: Negative for dizziness, weakness,numbness and headaches. There were no vitals taken for this visit. Physical Exam   Constitutional:  Oriented to person, place, and time. Appears well-developed and well-nourished. No acute distress. Neurological:Alert and oriented to person, place, and time. Skin: No diaphoresis. Psychiatric: Normal mood and affect. Behavior is Normal.     ASSESSMENT AND PLAN:    Zacarias Frost was seen today for headache, cough, nasal congestion and other. Diagnoses and all orders for this visit:    Acute frontal sinusitis, recurrence not specified  1. brompheniramine-pseudoephedrine-DM 2-30-10 MG/5ML syrup  2. azithromycin (ZITHROMAX) 250 MG table      Return if symptoms worsen or fail to improve. Electronically signed by Shira Simms DO on 1/6/2022 at 4:49 PM      TeleMedicine Patient Consent    This visit was performed as a virtual video visit using a synchronous, two-way, audio-video telehealth technology platform. Patient identification was verified at the start of the visit, including the patient's telephone number and physical location. I discussed with the patient the nature of our telehealth visits, that:     1.  Due to the nature of an audio- video modality, the only components of a physical exam that could be done are the elements supported by direct

## 2022-01-24 ENCOUNTER — TELEPHONE (OUTPATIENT)
Dept: PRIMARY CARE CLINIC | Age: 72
End: 2022-01-24

## 2022-01-24 NOTE — TELEPHONE ENCOUNTER
----- Message from Carla Curtis sent at 1/24/2022 10:56 AM EST -----  Subject: Message to Provider    QUESTIONS  Information for Provider? patient calling due to receiving letter from   insurance company stating that the injections received they will stop   paying for, unless pcp and can show why it should be continued. Patient   gave phone # [phone] for insurance to contact and clarify for   patient.   ---------------------------------------------------------------------------  --------------  CALL BACK INFO  What is the best way for the office to contact you? OK to leave message on   voicemail, OK to respond with electronic message via [employer] portal (only   for patients who have registered [employer] account)  Preferred Call Back Phone Number? [phone]  ---------------------------------------------------------------------------  --------------  SCRIPT ANSWERS  Relationship to Patient?  Self

## 2022-01-27 RX ORDER — EVOLOCUMAB 140 MG/ML
INJECTION, SOLUTION SUBCUTANEOUS
Qty: 2 ML | Refills: 5 | Status: ON HOLD
Start: 2022-01-27 | End: 2022-06-24 | Stop reason: HOSPADM

## 2022-03-01 ENCOUNTER — OFFICE VISIT (OUTPATIENT)
Dept: PRIMARY CARE CLINIC | Age: 72
End: 2022-03-01
Payer: MEDICARE

## 2022-03-01 VITALS
TEMPERATURE: 96.4 F | HEIGHT: 65 IN | DIASTOLIC BLOOD PRESSURE: 74 MMHG | WEIGHT: 176 LBS | HEART RATE: 69 BPM | BODY MASS INDEX: 29.32 KG/M2 | SYSTOLIC BLOOD PRESSURE: 120 MMHG | OXYGEN SATURATION: 97 % | RESPIRATION RATE: 18 BRPM

## 2022-03-01 DIAGNOSIS — M79.18 TENDERNESS OF BRACHIORADIALIS MUSCLE: Primary | ICD-10-CM

## 2022-03-01 DIAGNOSIS — E78.00 HYPERCHOLESTEROLEMIA: ICD-10-CM

## 2022-03-01 PROCEDURE — 99213 OFFICE O/P EST LOW 20 MIN: CPT | Performed by: INTERNAL MEDICINE

## 2022-03-01 RX ORDER — OMEPRAZOLE 20 MG/1
20 CAPSULE, DELAYED RELEASE ORAL DAILY
Status: ON HOLD | COMMUNITY
End: 2022-07-07 | Stop reason: HOSPADM

## 2022-03-01 RX ORDER — TIZANIDINE 2 MG/1
2 TABLET ORAL 4 TIMES DAILY PRN
Qty: 40 TABLET | Refills: 0 | Status: SHIPPED
Start: 2022-03-01 | End: 2022-04-08

## 2022-03-01 RX ORDER — METHYLPREDNISOLONE 4 MG/1
TABLET ORAL
Qty: 1 KIT | Refills: 0 | Status: SHIPPED
Start: 2022-03-01 | End: 2022-04-08

## 2022-03-01 NOTE — PROGRESS NOTES
3/1/22    Dereje Larios, a female of 66 y.o. presents today for Arm Pain (both arms, mostly the right)    At last appointment,   she was seen for sinus congestion. She has been having right arm pain for the past month. It radiates to her shoulder blade. She denies any trauma or injury. She has been lifting boxes helping her sister move. She is right handed. /74   Pulse 69   Temp 96.4 °F (35.8 °C)   Resp 18   Ht 5' 5\" (1.651 m)   Wt 176 lb (79.8 kg)   SpO2 97%   BMI 29.29 kg/m²     Review of Systems  Constitutional:Negative for activity change, appetite change, chills, fatigue and fever. Respiratory: Negative for choking, chest tightness, shortness of breath and wheezing. Cardiovascular: Negative for chest pain, palpitations and leg swelling. Gastrointestinal: Negative for abdominal distention, constipation, diarrhea, nausea and vomiting. Musculoskeletal: Negative for arthralgias, back pain, gait problem and joint swelling. Neurological: Negative for dizziness, weakness,numbness and headaches.      Patient Active Problem List    Diagnosis Date Noted    Chronic left shoulder pain 01/25/2019    Chest pain 02/20/2018    Hyperlipidemia LDL goal <100 02/20/2018    Acute cystitis 02/20/2018     Allergies   Allergen Reactions    Daypro [Oxaprozin] Itching    Demerol Hcl [Meperidine]     Levaquin [Levofloxacin In D5w]     Tape [Adhesive Tape] Itching     Ok to use paper tape per pt    Valium [Diazepam]      Current Outpatient Medications on File Prior to Visit   Medication Sig Dispense Refill    omeprazole (PRILOSEC) 20 MG delayed release capsule Take 20 mg by mouth daily      Evolocumab (REPATHA) 140 MG/ML SOSY INJECT 1ML INTO THE SKIN EVERY 14 DAYS 2 mL 5    diclofenac (VOLTAREN) 50 MG EC tablet TAKE ONE TABLET BY MOUTH THREE TIMES A DAY WITH MEALS 270 tablet 1    citalopram (CELEXA) 20 MG tablet TAKE ONE TABLET BY MOUTH EVERY DAY 90 tablet 1    aspirin 81 MG tablet Take 81 mg

## 2022-03-01 NOTE — PATIENT INSTRUCTIONS
Neck: Exercises  Introduction  Here are some examples of exercises for you to try. The exercises may be suggested for a condition or for rehabilitation. Start each exercise slowly. Ease off the exercises if you start to have pain. You will be told when to start these exercises and which ones will work best for you. How to do the exercises      Neck stretch   1. This stretch works best if you keep your shoulder down as you lean away from it. To help you remember to do this, start by relaxing your shoulders and lightly holding on to your thighs or your chair. 2. Tilt your head toward your shoulder and hold for 15 to 30 seconds. Let the weight of your head stretch your muscles. 3. If you would like a little added stretch, use your hand to gently and steadily pull your head toward your shoulder. For example, keeping your right shoulder down, lean your head to the left. 4. Repeat 2 to 4 times toward each shoulder. Diagonal neck stretch   1. Turn your head slightly toward the direction you will be stretching, and tilt your head diagonally toward your chest and hold for 15 to 30 seconds. 2. If you would like a little added stretch, use your hand to gently and steadily pull your head forward on the diagonal.  3. Repeat 2 to 4 times toward each side. Dorsal glide stretch   The dorsal glide stretches the back of the neck. If you feel pain, do not glide so far back. Some people find this exercise easier to do while lying on their backs with an ice pack on the neck. 1. Sit or stand tall and look straight ahead. 2. Slowly tuck your chin as you glide your head backward over your body  3. Hold for a count of 6, and then relax for up to 10 seconds. 4. Repeat 8 to 12 times. Chest and shoulder stretch   1. Sit or stand tall and glide your head backward as in the dorsal glide stretch. 2. Raise both arms so that your hands are next to your ears.   3. Take a deep breath, and as you breathe out, lower your elbows does not use the arm muscles. Rather, use your legs and your hips to create movement that makes your arm swing freely. 6. Use the movement from your hips and legs to guide the slightly swinging arm back and forth like a pendulum (or elephant trunk). Then guide it in circles that start small (about the size of a dinner plate). Make the circles a bit larger each day, as your pain allows. 7. Do this exercise for 5 minutes, 5 to 7 times each day. 8. As you have less pain, try bending over a little farther to do this exercise. This will increase the amount of movement at your shoulder. Posterior stretching exercise   4. Hold the elbow of your injured arm with your other hand. 5. Use your hand to pull your injured arm gently up and across your body. You will feel a gentle stretch across the back of your injured shoulder. 6. Hold for at least 15 to 30 seconds. Then slowly lower your arm. 7. Repeat 2 to 4 times. Up-the-back stretch   Your doctor or physical therapist may want you to wait to do this stretch until you have regained most of your range of motion and strength. You can do this stretch in different ways. Hold any of these stretches for at least 15 to 30 seconds. Repeat them 2 to 4 times. 5. Light stretch: Put your hand in your back pocket. Let it rest there to stretch your shoulder. 6. Moderate stretch: With your other hand, hold your injured arm (palm outward) behind your back by the wrist. Pull your arm up gently to stretch your shoulder. 7. Advanced stretch: Put a towel over your other shoulder. Put the hand of your injured arm behind your back. Now hold the back end of the towel. With the other hand, hold the front end of the towel in front of your body. Pull gently on the front end of the towel. This will bring your hand farther up your back to stretch your shoulder. Overhead stretch   6. Standing about an arm's length away, grasp onto a solid surface.  You could use a countertop, a doorknob, or the back of a sturdy chair. 7. With your knees slightly bent, bend forward with your arms straight. Lower your upper body, and let your shoulders stretch. 8. As your shoulders are able to stretch farther, you may need to take a step or two backward. 9. Hold for at least 15 to 30 seconds. Then stand up and relax. If you had stepped back during your stretch, step forward so you can keep your hands on the solid surface. 10. Repeat 2 to 4 times. Shoulder flexion (lying down)   To make a wand for this exercise, use a piece of PVC pipe or a broom handle with the broom removed. Make the wand about a foot wider than your shoulders. 3. Lie on your back, holding a wand with both hands. Your palms should face down as you hold the wand. 4. Keeping your elbows straight, slowly raise your arms over your head. Raise them until you feel a stretch in your shoulders, upper back, and chest.  5. Hold for 15 to 30 seconds. 6. Repeat 2 to 4 times. Shoulder rotation (lying down)   To make a wand for this exercise, use a piece of PVC pipe or a broom handle with the broom removed. Make the wand about a foot wider than your shoulders. 1. Lie on your back. Hold a wand with both hands with your elbows bent and palms up. 2. Keep your elbows close to your body, and move the wand across your body toward the sore arm. 3. Hold for 8 to 12 seconds. 4. Repeat 2 to 4 times. Wall climbing (to the side)   Avoid any movement that is straight to your side, and be careful not to arch your back. Your arm should stay about 30 degrees to the front of your side. 1. Stand with your side to a wall so that your fingers can just touch it at an angle about 30 degrees toward the front of your body. 2. Walk the fingers of your injured arm up the wall as high as pain permits. Try not to shrug your shoulder up toward your ear as you move your arm up.   3. Hold that position for a count of at least 15 to 20.  4. Walk your fingers back down to the starting position. 5. Repeat at least 2 to 4 times. Try to reach higher each time. Wall climbing (to the front)   During this stretching exercise, be careful not to arch your back. 1. Face a wall, and stand so your fingers can just touch it. 2. Keeping your shoulder down, walk the fingers of your injured arm up the wall as high as pain permits. (Don't shrug your shoulder up toward your ear.)  3. Hold your arm in that position for at least 15 to 30 seconds. 4. Slowly walk your fingers back down to where you started. 5. Repeat at least 2 to 4 times. Try to reach higher each time. Shoulder blade squeeze   1. Stand with your arms at your sides, and squeeze your shoulder blades together. Do not raise your shoulders up as you squeeze. 2. Hold 6 seconds. 3. Repeat 8 to 12 times. Scapular exercise: Arm reach   1. Lie flat on your back. This exercise is a very slight motion that starts with your arms raised (elbows straight, arms straight). 2. From this position, reach higher toward the willa or ceiling. Keep your elbows straight. All motion should be from your shoulder blade only. 3. Relax your arms back to where you started. 4. Repeat 8 to 12 times. Arm raise to the side   During this strengthening exercise, your arm should stay about 30 degrees to the front of your side. 1. Slowly raise your injured arm to the side, with your thumb facing up. Raise your arm 60 degrees at the most (shoulder level is 90 degrees). 2. Hold the position for 3 to 5 seconds. Then lower your arm back to your side. If you need to, bring your \"good\" arm across your body and place it under the elbow as you lower your injured arm. Use your good arm to keep your injured arm from dropping down too fast.  3. Repeat 8 to 12 times. 4. When you first start out, don't hold any extra weight in your hand. As you get stronger, you may use a 1-pound to 2-pound dumbbell or a small can of food.     Shoulder flexor and extensor exercise These are isometric exercises. That means you contract your muscles without actually moving. 1. Push forward (flex): Stand facing a wall or doorjamb, about 6 inches or less back. Hold your injured arm against your body. Make a closed fist with your thumb on top. Then gently push your hand forward into the wall with about 25% to 50% of your strength. Don't let your body move backward as you push. Hold for about 6 seconds. Relax for a few seconds. Repeat 8 to 12 times. 2. Push backward (extend): Stand with your back flat against a wall. Your upper arm should be against the wall, with your elbow bent 90 degrees (your hand straight ahead). Push your elbow gently back against the wall with about 25% to 50% of your strength. Don't let your body move forward as you push. Hold for about 6 seconds. Relax for a few seconds. Repeat 8 to 12 times. Scapular exercise: Wall push-ups   This exercise is best done with your fingers somewhat turned out, rather than straight up and down. 1. Stand facing a wall, about 12 inches to 18 inches away. 2. Place your hands on the wall at shoulder height. 3. Slowly bend your elbows and bring your face to the wall. Keep your back and hips straight. 4. Push back to where you started. 5. Repeat 8 to 12 times. 6. When you can do this exercise against a wall comfortably, you can try it against a counter. You can then slowly progress to the end of a couch, then to a sturdy chair, and finally to the floor. Scapular exercise: Retraction   For this exercise, you will need elastic exercise material, such as surgical tubing or Thera-Band. 1. Put the band around a solid object at about waist level. (A bedpost will work well.) Each hand should hold an end of the band. 2. With your elbows at your sides and bent to 90 degrees, pull the band back. Your shoulder blades should move toward each other. Then move your arms back where you started. 3. Repeat 8 to 12 times.   4. If you have good range of motion in your shoulders, try this exercise with your arms lifted out to the sides. Keep your elbows at a 90-degree angle. Raise the elastic band up to about shoulder level. Pull the band back to move your shoulder blades toward each other. Then move your arms back where you started. Internal rotator strengthening exercise   1. Start by tying a piece of elastic exercise material to a doorknob. You can use surgical tubing or Thera-Band. 2. Stand or sit with your shoulder relaxed and your elbow bent 90 degrees. Your upper arm should rest comfortably against your side. Squeeze a rolled towel between your elbow and your body for comfort. This will help keep your arm at your side. 3. Hold one end of the elastic band in the hand of the painful arm. 4. Slowly rotate your forearm toward your body until it touches your belly. Slowly move it back to where you started. 5. Keep your elbow and upper arm firmly tucked against the towel roll or at your side. 6. Repeat 8 to 12 times. External rotator strengthening exercise   1. Start by tying a piece of elastic exercise material to a doorknob. You can use surgical tubing or Thera-Band. (You may also hold one end of the band in each hand.)  2. Stand or sit with your shoulder relaxed and your elbow bent 90 degrees. Your upper arm should rest comfortably against your side. Squeeze a rolled towel between your elbow and your body for comfort. This will help keep your arm at your side. 3. Hold one end of the elastic band with the hand of the painful arm. 4. Start with your forearm across your belly. Slowly rotate the forearm out away from your body. Keep your elbow and upper arm tucked against the towel roll or the side of your body until you begin to feel tightness in your shoulder. Slowly move your arm back to where you started. 5. Repeat 8 to 12 times. Follow-up care is a key part of your treatment and safety.  Be sure to make and go to all appointments, and call your doctor if you are having problems. It's also a good idea to know your test results and keep a list of the medicines you take. Where can you learn more? Go to [email or web address] org and sign in to your [employer] account. Enter Fredrick Daugherty in the [facility] box to learn more about \"Rotator Cuff: Exercises. \"     If you do not have an account, please click on the \"Sign Up Now\" link. Current as of: November 16, 2020               Content Version: 12.8  © 7845-7338 [employer]. Care instructions adapted under license by [facility] ([facility]). If you have questions about a medical condition or this instruction, always ask your healthcare professional. [address] any warranty or liability for your use of this information.

## 2022-03-03 ENCOUNTER — HOSPITAL ENCOUNTER (OUTPATIENT)
Age: 72
Discharge: HOME | End: 2022-03-03
Payer: MEDICARE

## 2022-03-03 DIAGNOSIS — E78.00 HYPERCHOLESTEROLEMIA: ICD-10-CM

## 2022-03-03 LAB
ALBUMIN SERPL-MCNC: 4.1 G/DL (ref 3.5–5.2)
ALP BLD-CCNC: 68 U/L (ref 35–104)
ALT SERPL-CCNC: 13 U/L (ref 0–32)
ANION GAP SERPL CALCULATED.3IONS-SCNC: 12 MMOL/L (ref 7–16)
AST SERPL-CCNC: 16 U/L (ref 0–31)
BILIRUB SERPL-MCNC: 0.2 MG/DL (ref 0–1.2)
BUN BLDV-MCNC: 24 MG/DL (ref 6–23)
CALCIUM SERPL-MCNC: 10 MG/DL (ref 8.6–10.2)
CHLORIDE BLD-SCNC: 103 MMOL/L (ref 98–107)
CHOLESTEROL, TOTAL: 137 MG/DL (ref 0–199)
CO2: 23 MMOL/L (ref 22–29)
CREAT SERPL-MCNC: 0.9 MG/DL (ref 0.5–1)
GFR AFRICAN AMERICAN: >60
GFR NON-AFRICAN AMERICAN: >60 ML/MIN/1.73
GLUCOSE BLD-MCNC: 98 MG/DL (ref 74–99)
HCT VFR BLD CALC: 37.2 % (ref 34–48)
HDLC SERPL-MCNC: 57 MG/DL
HEMOGLOBIN: 11.8 G/DL (ref 11.5–15.5)
LDL CHOLESTEROL CALCULATED: 52 MG/DL (ref 0–99)
MCH RBC QN AUTO: 27.7 PG (ref 26–35)
MCHC RBC AUTO-ENTMCNC: 31.7 % (ref 32–34.5)
MCV RBC AUTO: 87.3 FL (ref 80–99.9)
PDW BLD-RTO: 14.2 FL (ref 11.5–15)
PLATELET # BLD: 350 E9/L (ref 130–450)
PMV BLD AUTO: 9.4 FL (ref 7–12)
POTASSIUM SERPL-SCNC: 4.7 MMOL/L (ref 3.5–5)
RBC # BLD: 4.26 E12/L (ref 3.5–5.5)
SODIUM BLD-SCNC: 138 MMOL/L (ref 132–146)
TOTAL PROTEIN: 8.1 G/DL (ref 6.4–8.3)
TRIGL SERPL-MCNC: 141 MG/DL (ref 0–149)
VLDLC SERPL CALC-MCNC: 28 MG/DL
WBC # BLD: 11.7 E9/L (ref 4.5–11.5)

## 2022-03-03 PROCEDURE — 80053 COMPREHEN METABOLIC PANEL: CPT

## 2022-03-03 PROCEDURE — 36415 COLL VENOUS BLD VENIPUNCTURE: CPT

## 2022-03-03 PROCEDURE — 85027 COMPLETE CBC AUTOMATED: CPT

## 2022-03-03 PROCEDURE — 80061 LIPID PANEL: CPT

## 2022-03-07 ENCOUNTER — HOSPITAL ENCOUNTER (OUTPATIENT)
Dept: GENERAL RADIOLOGY | Age: 72
Discharge: HOME | End: 2022-03-09
Payer: MEDICARE

## 2022-03-07 DIAGNOSIS — Z12.31 VISIT FOR SCREENING MAMMOGRAM: ICD-10-CM

## 2022-03-07 PROCEDURE — 77063 BREAST TOMOSYNTHESIS BI: CPT

## 2022-04-08 ENCOUNTER — NURSE TRIAGE (OUTPATIENT)
Dept: OTHER | Facility: CLINIC | Age: 72
End: 2022-04-08

## 2022-04-08 ENCOUNTER — OFFICE VISIT (OUTPATIENT)
Dept: PRIMARY CARE CLINIC | Age: 72
End: 2022-04-08
Payer: MEDICARE

## 2022-04-08 VITALS
OXYGEN SATURATION: 96 % | BODY MASS INDEX: 29.12 KG/M2 | DIASTOLIC BLOOD PRESSURE: 62 MMHG | SYSTOLIC BLOOD PRESSURE: 124 MMHG | HEART RATE: 75 BPM | TEMPERATURE: 97 F | WEIGHT: 175 LBS

## 2022-04-08 DIAGNOSIS — J01.10 ACUTE FRONTAL SINUSITIS, RECURRENCE NOT SPECIFIED: Primary | ICD-10-CM

## 2022-04-08 PROCEDURE — 99213 OFFICE O/P EST LOW 20 MIN: CPT | Performed by: INTERNAL MEDICINE

## 2022-04-08 RX ORDER — FLUTICASONE PROPIONATE 50 MCG
2 SPRAY, SUSPENSION (ML) NASAL DAILY
Qty: 16 G | Refills: 0 | Status: ON HOLD | OUTPATIENT
Start: 2022-04-08 | End: 2022-06-24 | Stop reason: HOSPADM

## 2022-04-08 RX ORDER — AZITHROMYCIN 250 MG/1
250 TABLET, FILM COATED ORAL SEE ADMIN INSTRUCTIONS
Qty: 6 TABLET | Refills: 0 | Status: SHIPPED | OUTPATIENT
Start: 2022-04-08 | End: 2022-04-13

## 2022-04-08 RX ORDER — BROMPHENIRAMINE MALEATE, PSEUDOEPHEDRINE HYDROCHLORIDE, AND DEXTROMETHORPHAN HYDROBROMIDE 2; 30; 10 MG/5ML; MG/5ML; MG/5ML
5 SYRUP ORAL 4 TIMES DAILY PRN
Qty: 250 ML | Refills: 0 | Status: SHIPPED
Start: 2022-04-08 | End: 2022-06-06

## 2022-04-08 NOTE — PROGRESS NOTES
4/8/22    Adilia Abernathy, a female of 66 y.o. presents today for Congestion, Cough, and Headache    She has been hvaing sinus congestion, cough. She did cough up a small blood clot. She has been having a headache. At last appointment she was treated with Medrol Zanaflex and exercises for brachial radialis tenderness as well as neck pain. She has taken a Covid test that was negative. Her symptoms have been present since Tuesday. She has been having nausea. /62   Pulse 75   Temp 97 °F (36.1 °C)   Wt 175 lb (79.4 kg)   SpO2 96%   BMI 29.12 kg/m²     Review of Systems  Constitutional:Negative for activity change, appetite change, chills, fatigue and fever. Respiratory: Negative for choking, chest tightness, shortness of breath and wheezing. Cardiovascular: Negative for chest pain, palpitations and leg swelling. Gastrointestinal: Negative for abdominal distention, constipation, diarrhea, nausea and vomiting. Musculoskeletal: Negative for arthralgias, back pain, gait problem and joint swelling. Neurological: Negative for dizziness, weakness,numbness and headaches.      Patient Active Problem List    Diagnosis Date Noted    Chronic left shoulder pain 01/25/2019    Chest pain 02/20/2018    Hyperlipidemia LDL goal <100 02/20/2018    Acute cystitis 02/20/2018     Allergies   Allergen Reactions    Daypro [Oxaprozin] Itching    Demerol Hcl [Meperidine]     Levaquin [Levofloxacin In D5w]     Tape [Adhesive Tape] Itching     Ok to use paper tape per pt    Valium [Diazepam]      Current Outpatient Medications on File Prior to Visit   Medication Sig Dispense Refill    omeprazole (PRILOSEC) 20 MG delayed release capsule Take 20 mg by mouth daily      Evolocumab (REPATHA) 140 MG/ML SOSY INJECT 1ML INTO THE SKIN EVERY 14 DAYS 2 mL 5    diclofenac (VOLTAREN) 50 MG EC tablet TAKE ONE TABLET BY MOUTH THREE TIMES A DAY WITH MEALS 270 tablet 1    citalopram (CELEXA) 20 MG tablet TAKE ONE TABLET BY MOUTH EVERY DAY 90 tablet 1    aspirin 81 MG tablet Take 81 mg by mouth daily Wayne/Dr Jyoti Funk Cholecalciferol (VITAMIN D3) 2000 UNITS CAPS Take 2,000 Units by mouth 2 times daily      Multiple Vitamins-Minerals (MULTIVITAMIN & MINERAL PO) Take 1 tablet by mouth daily      calcium carbonate 600 MG TABS tablet Take 1 tablet by mouth daily      Omega-3 Fatty Acids (FISH OIL) 1000 MG CAPS Take 2,000 mg by mouth 2 times daily        No current facility-administered medications on file prior to visit. Physical Exam   Constitutional:  Oriented to person, place, and time. Appears well-developed and well-nourished. No acute distress. HENT: No sinus tenderness or lymphadenopathy  Head: Normocephalic and atraumatic. Eyes: Eyes exhibits no discharge. No scleral icterus present. Neck: No tracheal deviation present. No thyromegaly present. Cardiovascular: Normal rate, regular rhythm, normal heart sounds and intact distal pulses. Exam reveals no gallop nor friction rub. No murmur heard. Pulmonary: Effort normal and breath sounds normal. No respiratory distress. No wheezes or rales. Abdomen: No signs of rigidity rebound or organomegaly  Musculoskeletal:  No tenderness to palpation  Neurological:Alert and oriented to person, place, and time. Skin: No diaphoresis. Psychiatric: Normal mood and affect. Behavior is Normal.     ASSESSMENT AND PLAN:    Assessment  Diagnoses and all orders for this visit:  Acute frontal sinusitis, recurrence not specified  -     azithromycin (ZITHROMAX) 250 MG tablet; Take 1 tablet by mouth See Admin Instructions for 5 days 500mg on day 1 followed by 250mg on days 2 - 5  -     brompheniramine-pseudoephedrine-DM 2-30-10 MG/5ML syrup;  Take 5 mLs by mouth 4 times daily as needed for Cough Ok to substitute nearest mL bottle  -     fluticasone (FLONASE) 50 MCG/ACT nasal spray; 2 sprays by Each Nostril route daily      Return if symptoms worsen or fail to improve.     Electronically signed by Blaine Hunt DO on 4/8/2022 at 4:23 PM    Blaine Hunt DO

## 2022-04-08 NOTE — TELEPHONE ENCOUNTER
Received call from [facility] at [facility] with [employer]. Subjective: Caller states \"Wants appointment with PCPRunny nose\"     Current Symptoms: Runny nose thick yellow drainage, head congestion, dizzy when laying on left ear, coughing with a little bit of eraser size blood clot x1, covid test negative, headache on left side feels like rubber band around    Onset: 3 days ago; rapid, worsening    Associated Symptoms: NA    Pain Severity: 7/10; aching; constant    Temperature: 98.6 orally    What has been tried: Over the counter cold med-did not help    LMP: NA Pregnant: NA    Recommended disposition: See in Office Today, advised caller if unable to get an appointment in the suggested time frame to go to an [facility] or ED, caller agreeable. Care advice provided, patient verbalizes understanding; denies any other questions or concerns; instructed to call back for any new or worsening symptoms. Patient/Caller agrees with recommended disposition; writer provided warm transfer to [facility] for appointment scheduling     Attention Provider: Thank you for allowing me to participate in the care of your patient. The patient was connected to triage in response to information provided to the ECC/PSC. Please do not respond through this encounter as the response is not directed to a shared pool.       Reason for Disposition   Coughing up santino-colored (reddish-brown) or blood-tinged sputum    Protocols used: COUGH-ADULT-OH

## 2022-04-28 ENCOUNTER — OFFICE VISIT (OUTPATIENT)
Dept: PRIMARY CARE CLINIC | Age: 72
End: 2022-04-28
Payer: MEDICARE

## 2022-04-28 VITALS
TEMPERATURE: 96.1 F | HEART RATE: 72 BPM | WEIGHT: 172 LBS | SYSTOLIC BLOOD PRESSURE: 110 MMHG | BODY MASS INDEX: 28.62 KG/M2 | OXYGEN SATURATION: 96 % | DIASTOLIC BLOOD PRESSURE: 60 MMHG

## 2022-04-28 DIAGNOSIS — M79.10 MYALGIA: Primary | ICD-10-CM

## 2022-04-28 DIAGNOSIS — J01.10 ACUTE FRONTAL SINUSITIS, RECURRENCE NOT SPECIFIED: ICD-10-CM

## 2022-04-28 DIAGNOSIS — F41.9 ANXIETY: ICD-10-CM

## 2022-04-28 DIAGNOSIS — M54.2 NECK PAIN: ICD-10-CM

## 2022-04-28 DIAGNOSIS — M79.10 MYALGIA: ICD-10-CM

## 2022-04-28 LAB
C-REACTIVE PROTEIN: 4.9 MG/DL (ref 0–0.4)
RHEUMATOID FACTOR: 10 IU/ML (ref 0–13)
TSH SERPL DL<=0.05 MIU/L-ACNC: 4.42 UIU/ML (ref 0.27–4.2)

## 2022-04-28 PROCEDURE — 99214 OFFICE O/P EST MOD 30 MIN: CPT | Performed by: INTERNAL MEDICINE

## 2022-04-28 RX ORDER — AZELASTINE 1 MG/ML
1 SPRAY, METERED NASAL 2 TIMES DAILY
Qty: 30 ML | Refills: 0 | Status: ON HOLD
Start: 2022-04-28 | End: 2022-06-24 | Stop reason: HOSPADM

## 2022-04-28 RX ORDER — CITALOPRAM 20 MG/1
TABLET ORAL
Qty: 90 TABLET | Refills: 1 | Status: SHIPPED
Start: 2022-04-28 | End: 2022-04-28

## 2022-04-28 RX ORDER — DULOXETIN HYDROCHLORIDE 30 MG/1
30 CAPSULE, DELAYED RELEASE ORAL DAILY
Qty: 30 CAPSULE | Refills: 0 | Status: SHIPPED
Start: 2022-04-28 | End: 2022-05-16 | Stop reason: SINTOL

## 2022-04-28 NOTE — PROGRESS NOTES
EVERY 14 DAYS 2 mL 5    diclofenac (VOLTAREN) 50 MG EC tablet TAKE ONE TABLET BY MOUTH THREE TIMES A DAY WITH MEALS 270 tablet 1    aspirin 81 MG tablet Take 81 mg by mouth daily Wayne/Dr Javon Miller Cholecalciferol (VITAMIN D3) 2000 UNITS CAPS Take 2,000 Units by mouth 2 times daily      Multiple Vitamins-Minerals (MULTIVITAMIN & MINERAL PO) Take 1 tablet by mouth daily      calcium carbonate 600 MG TABS tablet Take 1 tablet by mouth daily      Omega-3 Fatty Acids (FISH OIL) 1000 MG CAPS Take 2,000 mg by mouth 2 times daily        No current facility-administered medications on file prior to visit. Physical Exam   Constitutional:  Oriented to person, place, and time. Appears well-developed and well-nourished. No acute distress. HENT: No sinus tenderness or lymphadenopathy  Head: Normocephalic and atraumatic. Eyes: Eyes exhibits no discharge. No scleral icterus present. Neck: No tracheal deviation present. No thyromegaly present. Cardiovascular: Normal rate, regular rhythm, normal heart sounds and intact distal pulses. Exam reveals no gallop nor friction rub. No murmur heard. Pulmonary: Effort normal and breath sounds normal. No respiratory distress. No wheezes or rales. Abdomen: No signs of rigidity rebound or organomegaly  Musculoskeletal:  No tenderness to palpation  Neurological:Alert and oriented to person, place, and time. Skin: No diaphoresis. Psychiatric: Normal mood and affect. Behavior is Normal.     ASSESSMENT AND PLAN:    Assessment  Diagnoses and all orders for this visit:  Myalgia  -     JOIE; Future  -     Anti-DNA Antibody, Double-Stranded; Future  -     C-Reactive Protein; Future  -     Sedimentation Rate; Future  -     Rheumatoid Factor; Future  -     Cyclic Citrul Peptide Antibody, IgG; Future  -     Sjogren's Ab (SS-A, SS-B); Future  -     Ronquillo (FLAVIA) Antibody IgG; Future  -     TSH; Future  Anxiety  -     DULoxetine (CYMBALTA) 30 MG extended release capsule;  Take 1 capsule by mouth daily  Neck pain  Acute frontal sinusitis, recurrence not specified  -     azelastine (ASTELIN) 0.1 % nasal spray; 1 spray by Nasal route 2 times daily Use in each nostril as directed      Plan    1. Start Astelin, continue Flonase  2. Switch Celexa to Cymbalta for myalgias  3. Obtain autoimmune blood work  4. Consider low-dose of prednisone trial    Return in about 6 weeks (around 6/9/2022).     Electronically signed by Sol Maddox DO on 4/28/2022 at 11:36 AM    Sol Maddox DO

## 2022-04-29 LAB
ANTI DNA DOUBLE STRANDED: NEGATIVE
ANTI-NUCLEAR ANTIBODY (ANA): NEGATIVE
ENA TO SMITH (SM) ANTIBODY: NEGATIVE
ENA TO SSA (RO) ANTIBODY: NEGATIVE
ENA TO SSB (LA) ANTIBODY: NEGATIVE
SEDIMENTATION RATE, ERYTHROCYTE: 82 MM/HR (ref 0–20)

## 2022-05-02 LAB — CYCLIC CITRULLINATED PEPTIDE ANTIBODY IGG: 3.4 U/ML (ref 0–7)

## 2022-05-06 ENCOUNTER — TELEPHONE (OUTPATIENT)
Dept: PRIMARY CARE CLINIC | Age: 72
End: 2022-05-06

## 2022-05-06 NOTE — TELEPHONE ENCOUNTER
----- Message from Areli Sanchez sent at 5/6/2022 11:43 AM EDT -----  Subject: Message to Provider    QUESTIONS  Information for Provider? Pt is returning call about test results?   ---------------------------------------------------------------------------  --------------  CALL BACK INFO  What is the best way for the office to contact you? OK to leave message on   voicemail  Preferred Call Back Phone Number?  [phone]  ---------------------------------------------------------------------------  --------------  SCRIPT ANSWERS  undefined

## 2022-05-16 ENCOUNTER — TELEPHONE (OUTPATIENT)
Dept: PRIMARY CARE CLINIC | Age: 72
End: 2022-05-16

## 2022-05-16 RX ORDER — CITALOPRAM 20 MG/1
TABLET ORAL
Qty: 30 TABLET | Refills: 0 | Status: SHIPPED
Start: 2022-05-16 | End: 2022-06-27 | Stop reason: SDUPTHER

## 2022-05-16 NOTE — TELEPHONE ENCOUNTER
Pt taking DULoxetine (CYMBALTA) 30 MG extended release capsule     currently, reports upset stomach, constipation, and dizziness when taking it. Wants to try Citalopram (Celexa) instead.

## 2022-05-16 NOTE — TELEPHONE ENCOUNTER
Okay to stop Cymbalta. We will start her on citalopram to see if it helps her symptoms. Follow-up with Dr. Taiwo Barrera in 3 to 4 weeks or less she already has an appointment set.

## 2022-05-24 ENCOUNTER — OFFICE VISIT (OUTPATIENT)
Dept: PRIMARY CARE CLINIC | Age: 72
End: 2022-05-24
Payer: MEDICARE

## 2022-05-24 VITALS
OXYGEN SATURATION: 96 % | HEART RATE: 71 BPM | BODY MASS INDEX: 28.62 KG/M2 | WEIGHT: 172 LBS | DIASTOLIC BLOOD PRESSURE: 72 MMHG | SYSTOLIC BLOOD PRESSURE: 110 MMHG | TEMPERATURE: 96.2 F

## 2022-05-24 DIAGNOSIS — R79.89 ELEVATED TSH: ICD-10-CM

## 2022-05-24 DIAGNOSIS — E78.00 HYPERCHOLESTEROLEMIA: ICD-10-CM

## 2022-05-24 DIAGNOSIS — M79.10 MYALGIA: Primary | ICD-10-CM

## 2022-05-24 LAB
BASOPHILS ABSOLUTE: 0.05 E9/L (ref 0–0.2)
BASOPHILS RELATIVE PERCENT: 0.5 % (ref 0–2)
EOSINOPHILS ABSOLUTE: 0.25 E9/L (ref 0.05–0.5)
EOSINOPHILS RELATIVE PERCENT: 2.7 % (ref 0–6)
HCT VFR BLD CALC: 35.8 % (ref 34–48)
HEMOGLOBIN: 10.8 G/DL (ref 11.5–15.5)
IMMATURE GRANULOCYTES #: 0.04 E9/L
IMMATURE GRANULOCYTES %: 0.4 % (ref 0–5)
LYMPHOCYTES ABSOLUTE: 3.05 E9/L (ref 1.5–4)
LYMPHOCYTES RELATIVE PERCENT: 33 % (ref 20–42)
MCH RBC QN AUTO: 26.9 PG (ref 26–35)
MCHC RBC AUTO-ENTMCNC: 30.2 % (ref 32–34.5)
MCV RBC AUTO: 89.1 FL (ref 80–99.9)
MONOCYTES ABSOLUTE: 0.91 E9/L (ref 0.1–0.95)
MONOCYTES RELATIVE PERCENT: 9.8 % (ref 2–12)
NEUTROPHILS ABSOLUTE: 4.94 E9/L (ref 1.8–7.3)
NEUTROPHILS RELATIVE PERCENT: 53.6 % (ref 43–80)
PDW BLD-RTO: 14.6 FL (ref 11.5–15)
PLATELET # BLD: 406 E9/L (ref 130–450)
PMV BLD AUTO: 10.3 FL (ref 7–12)
RBC # BLD: 4.02 E12/L (ref 3.5–5.5)
T4 FREE: 1.03 NG/DL (ref 0.93–1.7)
TSH SERPL DL<=0.05 MIU/L-ACNC: 4.51 UIU/ML (ref 0.27–4.2)
WBC # BLD: 9.2 E9/L (ref 4.5–11.5)

## 2022-05-24 PROCEDURE — 99213 OFFICE O/P EST LOW 20 MIN: CPT | Performed by: INTERNAL MEDICINE

## 2022-05-24 RX ORDER — METHYLPREDNISOLONE 4 MG/1
TABLET ORAL
Qty: 1 KIT | Refills: 0 | Status: ON HOLD
Start: 2022-05-24 | End: 2022-06-24 | Stop reason: HOSPADM

## 2022-05-24 NOTE — PROGRESS NOTES
5/24/22    Savannah Tristan, a female of 66 y.o. presents today for GI Problem (possible constipation), Fever (having episodes of low grade fever), and Back Pain    At last appointment --- she was started on Cymbalta but could not tolerate it. She has been following with PMR. She was referred to PT but has failed this previously. She continues to have R thoracic pain. She has a CT coming up of her lungs as well. She went back on Celexa and her symptoms improved with the exception of constipation. She has been taking a stool softener and prunes. /72   Pulse 71   Temp 96.2 °F (35.7 °C)   Wt 172 lb (78 kg)   SpO2 96%   BMI 28.62 kg/m²     Review of Systems  Constitutional:Negative for activity change, appetite change, chills, fatigue and fever. Respiratory: Negative for choking, chest tightness, shortness of breath and wheezing. Cardiovascular: Negative for chest pain, palpitations and leg swelling. Gastrointestinal: Negative for abdominal distention, constipation, diarrhea, nausea and vomiting. Musculoskeletal: Negative for arthralgias, back pain, gait problem and joint swelling. Neurological: Negative for dizziness, weakness,numbness and headaches. Patient Active Problem List    Diagnosis Date Noted    Chronic left shoulder pain 01/25/2019    Chest pain 02/20/2018    Hyperlipidemia LDL goal <100 02/20/2018    Acute cystitis 02/20/2018     Allergies   Allergen Reactions    Daypro [Oxaprozin] Itching    Demerol Hcl [Meperidine]     Levaquin [Levofloxacin In D5w]     Tape [Adhesive Tape] Itching     Ok to use paper tape per pt    Valium [Diazepam]      Current Outpatient Medications on File Prior to Visit   Medication Sig Dispense Refill    citalopram (CELEXA) 20 MG tablet Take 0.5 tablets by mouth daily for 6 days, THEN 1 tablet daily for 24 days.  30 tablet 0    azelastine (ASTELIN) 0.1 % nasal spray 1 spray by Nasal route 2 times daily Use in each nostril as directed 30 mL 0    brompheniramine-pseudoephedrine-DM 2-30-10 MG/5ML syrup Take 5 mLs by mouth 4 times daily as needed for Cough Ok to substitute nearest mL bottle 250 mL 0    fluticasone (FLONASE) 50 MCG/ACT nasal spray 2 sprays by Each Nostril route daily 16 g 0    omeprazole (PRILOSEC) 20 MG delayed release capsule Take 20 mg by mouth daily      Evolocumab (REPATHA) 140 MG/ML SOSY INJECT 1ML INTO THE SKIN EVERY 14 DAYS 2 mL 5    diclofenac (VOLTAREN) 50 MG EC tablet TAKE ONE TABLET BY MOUTH THREE TIMES A DAY WITH MEALS 270 tablet 1    aspirin 81 MG tablet Take 81 mg by mouth daily Hold/Dr Cade Fruits Cholecalciferol (VITAMIN D3) 2000 UNITS CAPS Take 2,000 Units by mouth 2 times daily      Multiple Vitamins-Minerals (MULTIVITAMIN & MINERAL PO) Take 1 tablet by mouth daily      calcium carbonate 600 MG TABS tablet Take 1 tablet by mouth daily      Omega-3 Fatty Acids (FISH OIL) 1000 MG CAPS Take 2,000 mg by mouth 2 times daily        No current facility-administered medications on file prior to visit. Physical Exam   Constitutional:  Oriented to person, place, and time. Appears well-developed and well-nourished. No acute distress. HENT: No sinus tenderness or lymphadenopathy  Head: Normocephalic and atraumatic. Eyes: Eyes exhibits no discharge. No scleral icterus present. Neck: No tracheal deviation present. No thyromegaly present. Cardiovascular: Normal rate, regular rhythm, normal heart sounds and intact distal pulses. Exam reveals no gallop nor friction rub. No murmur heard. Pulmonary: Effort normal and breath sounds normal. No respiratory distress. No wheezes or rales. Abdomen: No signs of rigidity rebound or organomegaly  Musculoskeletal:  No tenderness to palpation  Neurological:Alert and oriented to person, place, and time. Skin: No diaphoresis. Psychiatric: Normal mood and affect.  Behavior is Normal.     ASSESSMENT AND PLAN:    Assessment  Diagnoses and all orders for this visit:  Myalgia  -     methylPREDNISolone (MEDROL DOSEPACK) 4 MG tablet; Take by mouth. Elevated TSH  -     TSH; Future  -     T4, Free; Future  Hypercholesterolemia  -     CBC with Auto Differential; Future      Plan    1. Start Medrol  2. Possible PMR  3. Repeat TSH - consider initiating low dose synthroid  4. Pending CT chest per physical medicine  5. Failed trial of cymbalta, now back on celexa  6. Consider discontinuing repatha at patient request    Return in about 1 month (around 6/24/2022).     Electronically signed by Judy Herbert DO on 5/24/2022 at 1:59 PM    Judy Herbert DO

## 2022-05-25 ENCOUNTER — TELEPHONE (OUTPATIENT)
Dept: PRIMARY CARE CLINIC | Age: 72
End: 2022-05-25

## 2022-05-25 DIAGNOSIS — E03.9 HYPOTHYROIDISM, UNSPECIFIED TYPE: Primary | ICD-10-CM

## 2022-05-25 RX ORDER — LEVOTHYROXINE SODIUM 0.03 MG/1
25 TABLET ORAL DAILY
Qty: 30 TABLET | Refills: 3 | Status: ON HOLD
Start: 2022-05-25 | End: 2022-07-07 | Stop reason: HOSPADM

## 2022-05-25 NOTE — TELEPHONE ENCOUNTER
Pt called and stated that synthroid was discussed during last appointment and wanted to get that called in.

## 2022-05-31 ENCOUNTER — TELEPHONE (OUTPATIENT)
Dept: PRIMARY CARE CLINIC | Age: 72
End: 2022-05-31

## 2022-05-31 ENCOUNTER — APPOINTMENT (OUTPATIENT)
Dept: CT IMAGING | Age: 72
End: 2022-05-31
Payer: MEDICARE

## 2022-05-31 VITALS
HEIGHT: 65 IN | SYSTOLIC BLOOD PRESSURE: 116 MMHG | BODY MASS INDEX: 28.16 KG/M2 | HEART RATE: 71 BPM | TEMPERATURE: 97.5 F | RESPIRATION RATE: 16 BRPM | WEIGHT: 169 LBS | DIASTOLIC BLOOD PRESSURE: 67 MMHG | OXYGEN SATURATION: 95 %

## 2022-05-31 DIAGNOSIS — R10.9 ABDOMINAL PAIN, UNSPECIFIED ABDOMINAL LOCATION: Primary | ICD-10-CM

## 2022-05-31 LAB
ALBUMIN SERPL-MCNC: 3.9 G/DL (ref 3.5–5.2)
ALP BLD-CCNC: 85 U/L (ref 35–104)
ALT SERPL-CCNC: 14 U/L (ref 0–32)
ANION GAP SERPL CALCULATED.3IONS-SCNC: 10 MMOL/L (ref 7–16)
AST SERPL-CCNC: 15 U/L (ref 0–31)
BASOPHILS ABSOLUTE: 0.08 E9/L (ref 0–0.2)
BASOPHILS RELATIVE PERCENT: 0.7 % (ref 0–2)
BILIRUB SERPL-MCNC: 0.3 MG/DL (ref 0–1.2)
BILIRUBIN URINE: NEGATIVE
BLOOD, URINE: NEGATIVE
BUN BLDV-MCNC: 20 MG/DL (ref 6–23)
CALCIUM SERPL-MCNC: 9.4 MG/DL (ref 8.6–10.2)
CHLORIDE BLD-SCNC: 103 MMOL/L (ref 98–107)
CLARITY: CLEAR
CO2: 24 MMOL/L (ref 22–29)
COLOR: YELLOW
CREAT SERPL-MCNC: 1 MG/DL (ref 0.5–1)
EOSINOPHILS ABSOLUTE: 0.29 E9/L (ref 0.05–0.5)
EOSINOPHILS RELATIVE PERCENT: 2.4 % (ref 0–6)
GFR AFRICAN AMERICAN: >60
GFR NON-AFRICAN AMERICAN: 54 ML/MIN/1.73
GLUCOSE BLD-MCNC: 109 MG/DL (ref 74–99)
GLUCOSE URINE: NEGATIVE MG/DL
HCT VFR BLD CALC: 34.5 % (ref 34–48)
HEMOGLOBIN: 10.5 G/DL (ref 11.5–15.5)
IMMATURE GRANULOCYTES #: 0.07 E9/L
IMMATURE GRANULOCYTES %: 0.6 % (ref 0–5)
KETONES, URINE: NEGATIVE MG/DL
LACTIC ACID: 0.9 MMOL/L (ref 0.5–2.2)
LEUKOCYTE ESTERASE, URINE: NEGATIVE
LIPASE: 31 U/L (ref 13–60)
LYMPHOCYTES ABSOLUTE: 3.37 E9/L (ref 1.5–4)
LYMPHOCYTES RELATIVE PERCENT: 28.2 % (ref 20–42)
MCH RBC QN AUTO: 26.5 PG (ref 26–35)
MCHC RBC AUTO-ENTMCNC: 30.4 % (ref 32–34.5)
MCV RBC AUTO: 87.1 FL (ref 80–99.9)
MONOCYTES ABSOLUTE: 1.21 E9/L (ref 0.1–0.95)
MONOCYTES RELATIVE PERCENT: 10.1 % (ref 2–12)
NEUTROPHILS ABSOLUTE: 6.93 E9/L (ref 1.8–7.3)
NEUTROPHILS RELATIVE PERCENT: 58 % (ref 43–80)
NITRITE, URINE: NEGATIVE
PDW BLD-RTO: 15 FL (ref 11.5–15)
PH UA: 6 (ref 5–9)
PLATELET # BLD: 421 E9/L (ref 130–450)
PMV BLD AUTO: 9.4 FL (ref 7–12)
POTASSIUM REFLEX MAGNESIUM: 4.6 MMOL/L (ref 3.5–5)
PROTEIN UA: NEGATIVE MG/DL
RBC # BLD: 3.96 E12/L (ref 3.5–5.5)
SODIUM BLD-SCNC: 137 MMOL/L (ref 132–146)
SPECIFIC GRAVITY UA: <=1.005 (ref 1–1.03)
TOTAL PROTEIN: 7.6 G/DL (ref 6.4–8.3)
UROBILINOGEN, URINE: 0.2 E.U./DL
WBC # BLD: 12 E9/L (ref 4.5–11.5)

## 2022-05-31 PROCEDURE — 96374 THER/PROPH/DIAG INJ IV PUSH: CPT

## 2022-05-31 PROCEDURE — 99285 EMERGENCY DEPT VISIT HI MDM: CPT

## 2022-05-31 PROCEDURE — 6360000004 HC RX CONTRAST MEDICATION: Performed by: RADIOLOGY

## 2022-05-31 PROCEDURE — 81003 URINALYSIS AUTO W/O SCOPE: CPT

## 2022-05-31 PROCEDURE — 85025 COMPLETE CBC W/AUTO DIFF WBC: CPT

## 2022-05-31 PROCEDURE — 87088 URINE BACTERIA CULTURE: CPT

## 2022-05-31 PROCEDURE — 83605 ASSAY OF LACTIC ACID: CPT

## 2022-05-31 PROCEDURE — 80053 COMPREHEN METABOLIC PANEL: CPT

## 2022-05-31 PROCEDURE — 83690 ASSAY OF LIPASE: CPT

## 2022-05-31 PROCEDURE — 74177 CT ABD & PELVIS W/CONTRAST: CPT

## 2022-05-31 PROCEDURE — 87077 CULTURE AEROBIC IDENTIFY: CPT

## 2022-05-31 PROCEDURE — 87186 SC STD MICRODIL/AGAR DIL: CPT

## 2022-05-31 PROCEDURE — 2580000003 HC RX 258: Performed by: RADIOLOGY

## 2022-05-31 RX ORDER — SODIUM CHLORIDE 0.9 % (FLUSH) 0.9 %
10 SYRINGE (ML) INJECTION PRN
Status: COMPLETED | OUTPATIENT
Start: 2022-05-31 | End: 2022-05-31

## 2022-05-31 RX ADMIN — IOPAMIDOL 75 ML: 755 INJECTION, SOLUTION INTRAVENOUS at 21:53

## 2022-05-31 RX ADMIN — SODIUM CHLORIDE, PRESERVATIVE FREE 10 ML: 5 INJECTION INTRAVENOUS at 21:50

## 2022-05-31 ASSESSMENT — PAIN DESCRIPTION - LOCATION
LOCATION: ABDOMEN
LOCATION: ABDOMEN

## 2022-05-31 ASSESSMENT — PAIN SCALES - GENERAL
PAINLEVEL_OUTOF10: 8
PAINLEVEL_OUTOF10: 8

## 2022-05-31 ASSESSMENT — PAIN - FUNCTIONAL ASSESSMENT
PAIN_FUNCTIONAL_ASSESSMENT: 0-10
PAIN_FUNCTIONAL_ASSESSMENT: 0-10

## 2022-05-31 NOTE — TELEPHONE ENCOUNTER
Pt called and stated left hip pain going into her stomach, has had some bloody stools recently. Said pain is getting worse, wants to know what she can do or if she needs to be seen sooner.

## 2022-05-31 NOTE — ED NOTES
Department of Emergency Medicine    FIRST PROVIDER TRIAGE NOTE             Independent MLP           5/31/22  4:32 PM EDT    Date of Encounter: 5/31/22   MRN: [id number]    Vitals:    05/31/22 1611 05/31/22 1626   BP:  116/67   Pulse: 75 71   Resp: 18 16   Temp:  97.5 °F (36.4 °C)   TempSrc:  Temporal   SpO2: 95% 95%   Weight: 172 lb (78 kg) 169 lb (76.7 kg)   Height:  5' 5\" (1.651 m)      HPI: Eduarda Sultana is a 66 y.o. female who presents to the ED for Abdominal Pain (lower abd pain down into right groin, being tx by pcp for same issue, supposed to have upcoming CT but unable to take pain)      ROS: Negative for cp or sob. Physical Exam:   Gen Appearance/Constitutional: alert  CV: regular rate     Initial Plan of Care: All treatment areas with department are currently occupied. Plan to order/Initiate the following while awaiting opening in ED: labs and imaging studies.     Initial Plan of Care: Initiate Treatment-Testing, Proceed toTreatment Area When Bed Available for ED Attending/MLP to Continue Care    Electronically signed by Vilma Boyce PA-C   DD: 5/31/22       Vilma Boyce PA-C  05/31/22 9065

## 2022-06-01 ENCOUNTER — APPOINTMENT (OUTPATIENT)
Dept: GENERAL RADIOLOGY | Age: 72
End: 2022-06-01
Payer: MEDICARE

## 2022-06-01 ENCOUNTER — HOSPITAL ENCOUNTER (EMERGENCY)
Age: 72
Discharge: HOME | End: 2022-06-01
Attending: EMERGENCY MEDICINE
Payer: MEDICARE

## 2022-06-01 ENCOUNTER — TELEPHONE (OUTPATIENT)
Dept: PRIMARY CARE CLINIC | Age: 72
End: 2022-06-01

## 2022-06-01 ENCOUNTER — APPOINTMENT (OUTPATIENT)
Dept: CT IMAGING | Age: 72
End: 2022-06-01
Payer: MEDICARE

## 2022-06-01 DIAGNOSIS — K59.00 CONSTIPATION, UNSPECIFIED CONSTIPATION TYPE: ICD-10-CM

## 2022-06-01 DIAGNOSIS — R10.84 GENERALIZED ABDOMINAL PAIN: Primary | ICD-10-CM

## 2022-06-01 DIAGNOSIS — R07.89 RIGHT-SIDED CHEST WALL PAIN: ICD-10-CM

## 2022-06-01 DIAGNOSIS — C79.9 METASTATIC MALIGNANT NEOPLASM, UNSPECIFIED SITE (HCC): ICD-10-CM

## 2022-06-01 PROCEDURE — 6360000004 HC RX CONTRAST MEDICATION: Performed by: EMERGENCY MEDICINE

## 2022-06-01 PROCEDURE — 73521 X-RAY EXAM HIPS BI 2 VIEWS: CPT

## 2022-06-01 PROCEDURE — 2580000003 HC RX 258: Performed by: EMERGENCY MEDICINE

## 2022-06-01 PROCEDURE — 6370000000 HC RX 637 (ALT 250 FOR IP): Performed by: EMERGENCY MEDICINE

## 2022-06-01 PROCEDURE — 6360000002 HC RX W HCPCS: Performed by: EMERGENCY MEDICINE

## 2022-06-01 PROCEDURE — 71275 CT ANGIOGRAPHY CHEST: CPT

## 2022-06-01 RX ORDER — POLYETHYLENE GLYCOL 3350 17 G/17G
17 POWDER, FOR SOLUTION ORAL DAILY PRN
Qty: 510 G | Refills: 0 | Status: SHIPPED | OUTPATIENT
Start: 2022-06-01 | End: 2022-07-01

## 2022-06-01 RX ORDER — FENTANYL CITRATE 50 UG/ML
25 INJECTION, SOLUTION INTRAMUSCULAR; INTRAVENOUS ONCE
Status: COMPLETED | OUTPATIENT
Start: 2022-06-01 | End: 2022-06-01

## 2022-06-01 RX ORDER — DOCUSATE SODIUM 100 MG/1
100 CAPSULE, LIQUID FILLED ORAL 2 TIMES DAILY
Qty: 30 CAPSULE | Refills: 0 | Status: ON HOLD | OUTPATIENT
Start: 2022-06-01 | End: 2022-06-24 | Stop reason: HOSPADM

## 2022-06-01 RX ORDER — OXYCODONE HYDROCHLORIDE AND ACETAMINOPHEN 5; 325 MG/1; MG/1
1 TABLET ORAL EVERY 6 HOURS PRN
Qty: 15 TABLET | Refills: 0 | Status: SHIPPED | OUTPATIENT
Start: 2022-06-01 | End: 2022-06-06 | Stop reason: SDUPTHER

## 2022-06-01 RX ORDER — 0.9 % SODIUM CHLORIDE 0.9 %
500 INTRAVENOUS SOLUTION INTRAVENOUS ONCE
Status: COMPLETED | OUTPATIENT
Start: 2022-06-01 | End: 2022-06-01

## 2022-06-01 RX ADMIN — FENTANYL CITRATE 25 MCG: 50 INJECTION, SOLUTION INTRAMUSCULAR; INTRAVENOUS at 01:22

## 2022-06-01 RX ADMIN — IOPAMIDOL 75 ML: 755 INJECTION, SOLUTION INTRAVENOUS at 00:59

## 2022-06-01 RX ADMIN — SODIUM CHLORIDE 500 ML: 9 INJECTION, SOLUTION INTRAVENOUS at 01:21

## 2022-06-01 RX ADMIN — MAGNESIUM CITRATE 300 ML: 1.75 LIQUID ORAL at 01:22

## 2022-06-01 ASSESSMENT — ENCOUNTER SYMPTOMS
SORE THROAT: 0
DIARRHEA: 0
BACK PAIN: 0
EYE DISCHARGE: 0
EYE PAIN: 0
SHORTNESS OF BREATH: 0
ABDOMINAL DISTENTION: 0
SINUS PRESSURE: 0
VOMITING: 0
WHEEZING: 0
NAUSEA: 0
ABDOMINAL PAIN: 1
COUGH: 0
EYE REDNESS: 0

## 2022-06-01 ASSESSMENT — PAIN SCALES - GENERAL: PAINLEVEL_OUTOF10: 10

## 2022-06-01 NOTE — ED PROVIDER NOTES
The history is provided by the patient and medical records. 76-year-old female presents emerged part with abdominal pain and right-sided hip pain. Been going on for approximately greater than 1 month. Nothing make it better or worse. Also this is having constipation for the past couple days. Previously has taken Colace which has been effective however has had a bowel movement in 2 days. Denies any chest pain or shortness of breath however does elicit some right rib pain under her breast that radiates through her back. Otherwise denies any shortness of breath. Denies any changes to urinary habits. No other acute complaints at this time. The patient presents with abd pain that has been going on for 1 month. These symptoms are moderate in severity. Symptoms are made better by nothng. Symptoms are made worse by nothgn. Associated symptoms include none. Review of Systems   Constitutional: Negative for chills and fever. HENT: Negative for ear pain, sinus pressure and sore throat. Eyes: Negative for pain, discharge and redness. Respiratory: Negative for cough, shortness of breath and wheezing. Cardiovascular: Negative for chest pain. Gastrointestinal: Positive for abdominal pain. Negative for abdominal distention, diarrhea, nausea and vomiting. Genitourinary: Negative for dysuria and frequency. Musculoskeletal: Negative for arthralgias and back pain. Skin: Negative for rash and wound. Neurological: Negative for weakness and headaches. Hematological: Negative for adenopathy. All other systems reviewed and are negative. Physical Exam  Vitals and nursing note reviewed. Constitutional:       General: She is not in acute distress. Appearance: Normal appearance. She is normal weight. She is not toxic-appearing. HENT:      Head: Normocephalic and atraumatic.    Eyes:      Conjunctiva/sclera: Conjunctivae normal.   Cardiovascular:      Rate and Rhythm: Normal rate and regular rhythm. Pulses: Normal pulses. Heart sounds: Normal heart sounds. No murmur heard. No gallop. Pulmonary:      Effort: Pulmonary effort is normal. No respiratory distress. Breath sounds: Normal breath sounds. No wheezing or rales. Abdominal:      General: Abdomen is flat. Bowel sounds are normal. There is no distension. Palpations: Abdomen is soft. Tenderness: There is no abdominal tenderness. There is no guarding or rebound. Negative signs include Pate's sign and McBurney's sign. Skin:     General: Skin is warm and dry. Capillary Refill: Capillary refill takes less than 2 seconds. Neurological:      General: No focal deficit present. Mental Status: She is alert and oriented to person, place, and time. Procedures     MDM     58-year-old female presents emerged from complaint generalized abdominal pain. Patient noted on CAT scan to have concern for metastatic disease as well as constipation. Given pain meds as well as mag citrate here in the emergency department. She was having some right-sided chest pain with the concern of metastatic cancer did get a CT of the chest.  Which showed multiple masses with some slight central cavitation on a few of them. No pulmonary embolism noted. Patient will be discharged home on pain meds as well as constipation meds as well. She is safe to be discharged to follow-up with her family doctor as well as oncologist.    Patient safe for discharge from the emergency department. Did advise on return precautions to the emergency department. Did also advise follow-up with her primary care physician. Patient agreeable with the plan moving forward.                 --------------------------------------------- PAST HISTORY ---------------------------------------------  Past Medical History:  has a past medical history of Arthritis, Hyperlipidemia, and Right shoulder pain.     Past Surgical History:  has a past surgical history that includes Tubal ligation; Colonoscopy; Ureteroscopy (Left, 09/14/2015); eye surgery (Bilateral); Hysterectomy; Shoulder arthroscopy (Right, 9/5/2019); and Shoulder arthroscopy (Left, 5/21/2020). Social History:  reports that she has never smoked. She has never used smokeless tobacco. She reports that she does not drink alcohol and does not use drugs. Family History: family history includes Breast Cancer (age of onset: 48) in her niece; Breast Cancer (age of onset: 77) in her sister. The patients home medications have been reviewed.     Allergies: Atorvastatin, Avelox [moxifloxacin], Codeine, Daypro [oxaprozin], Demerol hcl [meperidine], Levaquin [levofloxacin in d5w], Tape [adhesive tape], and Valium [diazepam]    -------------------------------------------------- RESULTS -------------------------------------------------  Labs:  Results for orders placed or performed during the hospital encounter of 06/01/22   CBC with Auto Differential   Result Value Ref Range    WBC 12.0 (H) 4.5 - 11.5 E9/L    RBC 3.96 3.50 - 5.50 E12/L    Hemoglobin 10.5 (L) 11.5 - 15.5 g/dL    Hematocrit 34.5 34.0 - 48.0 %    MCV 87.1 80.0 - 99.9 fL    MCH 26.5 26.0 - 35.0 pg    MCHC 30.4 (L) 32.0 - 34.5 %    RDW 15.0 11.5 - 15.0 fL    Platelets 778 342 - 764 E9/L    MPV 9.4 7.0 - 12.0 fL    Neutrophils % 58.0 43.0 - 80.0 %    Immature Granulocytes % 0.6 0.0 - 5.0 %    Lymphocytes % 28.2 20.0 - 42.0 %    Monocytes % 10.1 2.0 - 12.0 %    Eosinophils % 2.4 0.0 - 6.0 %    Basophils % 0.7 0.0 - 2.0 %    Neutrophils Absolute 6.93 1.80 - 7.30 E9/L    Immature Granulocytes # 0.07 E9/L    Lymphocytes Absolute 3.37 1.50 - 4.00 E9/L    Monocytes Absolute 1.21 (H) 0.10 - 0.95 E9/L    Eosinophils Absolute 0.29 0.05 - 0.50 E9/L    Basophils Absolute 0.08 0.00 - 0.20 E9/L   Comprehensive Metabolic Panel w/ Reflex to MG   Result Value Ref Range    Sodium 137 132 - 146 mmol/L    Potassium reflex Magnesium 4.6 3.5 - 5.0 mmol/L    Chloride 103 98 - 107 mmol/L    CO2 24 22 - 29 mmol/L    Anion Gap 10 7 - 16 mmol/L    Glucose 109 (H) 74 - 99 mg/dL    BUN 20 6 - 23 mg/dL    CREATININE 1.0 0.5 - 1.0 mg/dL    GFR Non-African American 54 >=60 mL/min/1.73    GFR African American >60     Calcium 9.4 8.6 - 10.2 mg/dL    Total Protein 7.6 6.4 - 8.3 g/dL    Albumin 3.9 3.5 - 5.2 g/dL    Total Bilirubin 0.3 0.0 - 1.2 mg/dL    Alkaline Phosphatase 85 35 - 104 U/L    ALT 14 0 - 32 U/L    AST 15 0 - 31 U/L   Lipase   Result Value Ref Range    Lipase 31 13 - 60 U/L   Lactic Acid   Result Value Ref Range    Lactic Acid 0.9 0.5 - 2.2 mmol/L   Urinalysis   Result Value Ref Range    Color, UA Yellow Straw/Yellow    Clarity, UA Clear Clear    Glucose, Ur Negative Negative mg/dL    Bilirubin Urine Negative Negative    Ketones, Urine Negative Negative mg/dL    Specific Gravity, UA <=1.005 1.005 - 1.030    Blood, Urine Negative Negative    pH, UA 6.0 5.0 - 9.0    Protein, UA Negative Negative mg/dL    Urobilinogen, Urine 0.2 <2.0 E.U./dL    Nitrite, Urine Negative Negative    Leukocyte Esterase, Urine Negative Negative       Radiology:  CT ABDOMEN PELVIS W IV CONTRAST Additional Contrast? None   Final Result   Colonic fecal retention involving the ascending and transverse colon. Multiple masses involving the visualized lungs worrisome for metastatic   disease. 0.0 cm lytic osseous lesion involving the right side of the symphysis pubis. Findings worrisome for metastatic disease. Hiatal hernia. CTA PULMONARY W CONTRAST    (Results Pending)   XR HIP BILATERAL W AP PELVIS (2 VIEWS)    (Results Pending)       ------------------------- NURSING NOTES AND VITALS REVIEWED ---------------------------  Date / Time Roomed:  6/1/2022 12:16 AM  ED Bed Assignment:  07/07    The nursing notes within the ED encounter and vital signs as below have been reviewed.    /67   Pulse 71   Temp 97.5 °F (36.4 °C) (Temporal)   Resp 16   Ht 5' 5\" (1.651 m)   Wt 169 lb (76.7 kg)   SpO2 95%   BMI 28.12 kg/m²   Oxygen Saturation Interpretation: Normal      ------------------------------------------ PROGRESS NOTES ------------------------------------------  12:43 AM EDT  I have spoken with the patient and discussed todays results, in addition to providing specific details for the plan of care and counseling regarding the diagnosis and prognosis. Their questions are answered at this time and they are agreeable with the plan. I discussed at length with them reasons for immediate return here for re evaluation. They will followup with their primary care physician by calling their office on Monday.      --------------------------------- ADDITIONAL PROVIDER NOTES ---------------------------------  At this time the patient is without objective evidence of an acute process requiring hospitalization or inpatient management. They have remained hemodynamically stable throughout their entire ED visit and are stable for discharge with outpatient follow-up. The plan has been discussed in detail and they are aware of the specific conditions for emergent return, as well as the importance of follow-up. New Prescriptions    DOCUSATE SODIUM (COLACE) 100 MG CAPSULE    Take 1 capsule by mouth 2 times daily    OXYCODONE-ACETAMINOPHEN (PERCOCET) 5-325 MG PER TABLET    Take 1 tablet by mouth every 6 hours as needed for Pain for up to 5 days. Intended supply: 3 days. Take lowest dose possible to manage pain    POLYETHYLENE GLYCOL (GLYCOLAX) 17 GM/SCOOP POWDER    Take 17 g by mouth daily as needed (constipation)       Diagnosis:  1. Generalized abdominal pain    2. Constipation, unspecified constipation type    3. Right-sided chest wall pain    4. Metastatic malignant neoplasm, unspecified site [facility])        Disposition:  Patient's disposition: Discharge to home  Patient's condition is stable.        Shelia Campos MD  Resident  06/01/22 3726

## 2022-06-01 NOTE — TELEPHONE ENCOUNTER
LM notifying pt we dont have anything sooner than that and told her to call back with any questions.

## 2022-06-01 NOTE — TELEPHONE ENCOUNTER
Pt was in the ER last night and was diagnosed with cancer. They told her that she needs a referral from PCP to Dr. Clayton Maldonado the oncologist. Please advise.

## 2022-06-01 NOTE — TELEPHONE ENCOUNTER
----- Message from Philipp Andrews sent at 6/1/2022  9:37 AM EDT -----  Subject: Appointment Request    Reason for Call: Urgent (Patient Request) ED Follow Up Visit    QUESTIONS  Type of Appointment? Established Patient  Reason for appointment request? Available appointments did not meet   patient need  Additional Information for Provider? I searched the next available appt   was 6/7. Patient was in ED on 5/31 @ [facility]   for Abdominal Pain (which they told her she has Cancer). The told her to   see the provider ASAP. She does have an appt on 6/6. Wanted something   sooner if possible.   ---------------------------------------------------------------------------  --------------  CALL BACK INFO  What is the best way for the office to contact you? OK to leave message on   voicemail  Preferred Call Back Phone Number? [phone]  ---------------------------------------------------------------------------  --------------  SCRIPT ANSWERS  Relationship to Patient? Self  (Patient requests to see provider urgently. )? Yes  Have you been diagnosed with, awaiting test results for, or told that you   are suspected of having COVID-19 (Coronavirus)? (If patient has tested   negative or was tested as a requirement for work, school, or travel and   not based on symptoms, answer no)? No  Within the past 10 days have you developed any of the following symptoms   (answer no if symptoms have been present longer than 10 days or began   more than 10 days ago)? Fever or Chills, Cough, Shortness of breath or   difficulty breathing, Loss of taste or smell, Sore throat, Nasal   congestion, Sneezing or runny nose, Fatigue or generalized body aches   (answer no if pain is specific to a body part e.g. back pain), Diarrhea,   Headache? No  Have you had close contact with someone with COVID-19 in the last 7 days? No  (Service Expert  click yes below to proceed with [email or web address] As Usual   Scheduling)?  Yes

## 2022-06-01 NOTE — TELEPHONE ENCOUNTER
Pt was in the ER last night and was diagnosed with cancer. They told her that she needs a referral from PCP to Dr. Rocío Foster the oncologist. Please advise.

## 2022-06-02 DIAGNOSIS — R91.8 LUNG MASS: Primary | ICD-10-CM

## 2022-06-03 LAB
ORGANISM: ABNORMAL
URINE CULTURE, ROUTINE: ABNORMAL

## 2022-06-06 ENCOUNTER — OFFICE VISIT (OUTPATIENT)
Dept: PRIMARY CARE CLINIC | Age: 72
End: 2022-06-06
Payer: MEDICARE

## 2022-06-06 VITALS
HEART RATE: 68 BPM | OXYGEN SATURATION: 95 % | SYSTOLIC BLOOD PRESSURE: 122 MMHG | BODY MASS INDEX: 28.46 KG/M2 | DIASTOLIC BLOOD PRESSURE: 62 MMHG | WEIGHT: 171 LBS | TEMPERATURE: 97.6 F

## 2022-06-06 DIAGNOSIS — R10.84 GENERALIZED ABDOMINAL PAIN: ICD-10-CM

## 2022-06-06 DIAGNOSIS — R91.8 LUNG MASS: Primary | ICD-10-CM

## 2022-06-06 DIAGNOSIS — C79.9 METASTATIC MALIGNANT NEOPLASM, UNSPECIFIED SITE (HCC): ICD-10-CM

## 2022-06-06 PROCEDURE — 99213 OFFICE O/P EST LOW 20 MIN: CPT | Performed by: INTERNAL MEDICINE

## 2022-06-06 PROCEDURE — 1123F ACP DISCUSS/DSCN MKR DOCD: CPT | Performed by: INTERNAL MEDICINE

## 2022-06-06 RX ORDER — CEPHALEXIN 500 MG/1
CAPSULE ORAL
Status: ON HOLD | COMMUNITY
Start: 2022-06-04 | End: 2022-06-24 | Stop reason: HOSPADM

## 2022-06-06 RX ORDER — OXYCODONE HYDROCHLORIDE AND ACETAMINOPHEN 5; 325 MG/1; MG/1
1 TABLET ORAL EVERY 6 HOURS PRN
Qty: 15 TABLET | Refills: 0 | Status: SHIPPED | OUTPATIENT
Start: 2022-06-06 | End: 2022-06-11

## 2022-06-06 ASSESSMENT — PATIENT HEALTH QUESTIONNAIRE - PHQ9
SUM OF ALL RESPONSES TO PHQ QUESTIONS 1-9: 0
1. LITTLE INTEREST OR PLEASURE IN DOING THINGS: 0
2. FEELING DOWN, DEPRESSED OR HOPELESS: 0
SUM OF ALL RESPONSES TO PHQ9 QUESTIONS 1 & 2: 0
SUM OF ALL RESPONSES TO PHQ QUESTIONS 1-9: 0

## 2022-06-06 NOTE — PROGRESS NOTES
6/6/22    Savannah Tristan, a female of 66 y.o. presents today for Follow-up    Last seen in the ER which revealed a left lower lobe cavitary mass. She has been having chest pain and hip pain. She is going to follow with an oncologist on Friday. She is going to hold repatha therapy. /62   Pulse 68   Temp 97.6 °F (36.4 °C)   Wt 171 lb (77.6 kg)   SpO2 95%   BMI 28.46 kg/m²     Review of Systems  Constitutional:Negative for activity change, appetite change, chills, fatigue and fever. Respiratory: Negative for choking, chest tightness, shortness of breath and wheezing. Cardiovascular: Negative for chest pain, palpitations and leg swelling. Gastrointestinal: Negative for abdominal distention, constipation, diarrhea, nausea and vomiting. Musculoskeletal: Negative for arthralgias, back pain, gait problem and joint swelling. Neurological: Negative for dizziness, weakness,numbness and headaches.      Patient Active Problem List    Diagnosis Date Noted    Chronic left shoulder pain 01/25/2019    Chest pain 02/20/2018    Hyperlipidemia LDL goal <100 02/20/2018    Acute cystitis 02/20/2018     Allergies   Allergen Reactions    Atorvastatin     Avelox [Moxifloxacin]     Codeine     Daypro [Oxaprozin] Itching    Demerol Hcl [Meperidine]     Levaquin [Levofloxacin In D5w]     Tape [Adhesive Tape] Itching     Ok to use paper tape per pt    Valium [Diazepam]      Current Outpatient Medications on File Prior to Visit   Medication Sig Dispense Refill    cephALEXin (KEFLEX) 500 MG capsule TAKE ONE CAPSULE BY MOUTH FOUR TIMES A DAY FOR 5 DAYS      docusate sodium (COLACE) 100 mg capsule Take 1 capsule by mouth 2 times daily 30 capsule 0    polyethylene glycol (GLYCOLAX) 17 GM/SCOOP powder Take 17 g by mouth daily as needed (constipation) 510 g 0    levothyroxine (SYNTHROID) 25 MCG tablet Take 1 tablet by mouth Daily 30 tablet 3    methylPREDNISolone (MEDROL DOSEPACK) 4 MG tablet Take by mouth. 1 kit 0    citalopram (CELEXA) 20 MG tablet Take 0.5 tablets by mouth daily for 6 days, THEN 1 tablet daily for 24 days. 30 tablet 0    azelastine (ASTELIN) 0.1 % nasal spray 1 spray by Nasal route 2 times daily Use in each nostril as directed 30 mL 0    fluticasone (FLONASE) 50 MCG/ACT nasal spray 2 sprays by Each Nostril route daily 16 g 0    omeprazole (PRILOSEC) 20 MG delayed release capsule Take 20 mg by mouth daily      Evolocumab (REPATHA) 140 MG/ML SOSY INJECT 1ML INTO THE SKIN EVERY 14 DAYS 2 mL 5    diclofenac (VOLTAREN) 50 MG EC tablet TAKE ONE TABLET BY MOUTH THREE TIMES A DAY WITH MEALS 270 tablet 1    aspirin 81 MG tablet Take 81 mg by mouth daily Hold/Dr Miguel Reyes Cholecalciferol (VITAMIN D3) 2000 UNITS CAPS Take 2,000 Units by mouth 2 times daily      Multiple Vitamins-Minerals (MULTIVITAMIN & MINERAL PO) Take 1 tablet by mouth daily      calcium carbonate 600 MG TABS tablet Take 1 tablet by mouth daily      Omega-3 Fatty Acids (FISH OIL) 1000 MG CAPS Take 2,000 mg by mouth 2 times daily        No current facility-administered medications on file prior to visit. Physical Exam   Constitutional:  Oriented to person, place, and time. Appears well-developed and well-nourished. No acute distress. HENT: No sinus tenderness or lymphadenopathy  Head: Normocephalic and atraumatic. Eyes: Eyes exhibits no discharge. No scleral icterus present. Neck: No tracheal deviation present. No thyromegaly present. Cardiovascular: Normal rate, regular rhythm, normal heart sounds and intact distal pulses. Exam reveals no gallop nor friction rub. No murmur heard. Pulmonary: Effort normal and breath sounds normal. No respiratory distress. No wheezes or rales. Abdomen: No signs of rigidity rebound or organomegaly  Musculoskeletal:  No tenderness to palpation  Neurological:Alert and oriented to person, place, and time. Skin: No diaphoresis. Psychiatric: Normal mood and affect. Behavior is Normal.     ASSESSMENT AND PLAN:    Assessment  Diagnoses and all orders for this visit:  Lung mass  Generalized abdominal pain  -     oxyCODONE-acetaminophen (PERCOCET) 5-325 MG per tablet; Take 1 tablet by mouth every 6 hours as needed for Pain for up to 5 days. Intended supply: 3 days. Take lowest dose possible to manage pain  Metastatic malignant neoplasm, unspecified site ([address]  -     oxyCODONE-acetaminophen (PERCOCET) 5-325 MG per tablet; Take 1 tablet by mouth every 6 hours as needed for Pain for up to 5 days. Intended supply: 3 days. Take lowest dose possible to manage pain      Plan    1. continue percocet for now for pain associated with masses, likely malignant  2. Following with oncology for further testing  3.  Pain improving       Electronically signed by Pretty Craft DO on 6/6/2022 at 10:31 AM    Pretty Craft DO

## 2022-06-20 ENCOUNTER — APPOINTMENT (OUTPATIENT)
Dept: CT IMAGING | Age: 72
DRG: 180 | End: 2022-06-20
Payer: MEDICARE

## 2022-06-20 ENCOUNTER — HOSPITAL ENCOUNTER (INPATIENT)
Age: 72
LOS: 4 days | Discharge: HOME HEALTH | DRG: 180 | End: 2022-06-24
Attending: EMERGENCY MEDICINE | Admitting: INTERNAL MEDICINE
Payer: MEDICARE

## 2022-06-20 DIAGNOSIS — R91.8 LUNG MASS: ICD-10-CM

## 2022-06-20 DIAGNOSIS — D50.0 ANEMIA DUE TO GASTROINTESTINAL BLOOD LOSS: Primary | ICD-10-CM

## 2022-06-20 DIAGNOSIS — K59.00 CONSTIPATION, UNSPECIFIED CONSTIPATION TYPE: ICD-10-CM

## 2022-06-20 DIAGNOSIS — Z51.5 PALLIATIVE CARE ENCOUNTER: ICD-10-CM

## 2022-06-20 PROBLEM — E07.9 THYROID DISEASE: Status: ACTIVE | Noted: 2022-06-20

## 2022-06-20 PROBLEM — R52 INTRACTABLE PAIN: Status: ACTIVE | Noted: 2022-06-20

## 2022-06-20 PROBLEM — M25.551 RIGHT HIP PAIN: Status: ACTIVE | Noted: 2022-06-20

## 2022-06-20 LAB
ALBUMIN SERPL-MCNC: 3.6 G/DL (ref 3.5–5.2)
ALP BLD-CCNC: 78 U/L (ref 35–104)
ALT SERPL-CCNC: 21 U/L (ref 0–32)
ANION GAP SERPL CALCULATED.3IONS-SCNC: 14 MMOL/L (ref 7–16)
AST SERPL-CCNC: 19 U/L (ref 0–31)
BACTERIA: NORMAL /HPF
BASOPHILS ABSOLUTE: 0.05 E9/L (ref 0–0.2)
BASOPHILS RELATIVE PERCENT: 0.6 % (ref 0–2)
BILIRUB SERPL-MCNC: 0.5 MG/DL (ref 0–1.2)
BILIRUBIN DIRECT: <0.2 MG/DL (ref 0–0.3)
BILIRUBIN URINE: NEGATIVE
BILIRUBIN, INDIRECT: NORMAL MG/DL (ref 0–1)
BLOOD, URINE: NEGATIVE
BUN BLDV-MCNC: 14 MG/DL (ref 6–23)
CALCIUM SERPL-MCNC: 9.5 MG/DL (ref 8.6–10.2)
CHLORIDE BLD-SCNC: 99 MMOL/L (ref 98–107)
CLARITY: CLEAR
CO2: 22 MMOL/L (ref 22–29)
COLOR: YELLOW
CREAT SERPL-MCNC: 0.6 MG/DL (ref 0.5–1)
EOSINOPHILS ABSOLUTE: 0.11 E9/L (ref 0.05–0.5)
EOSINOPHILS RELATIVE PERCENT: 1.3 % (ref 0–6)
EPITHELIAL CELLS, UA: NORMAL /HPF
GFR AFRICAN AMERICAN: >60
GFR NON-AFRICAN AMERICAN: >60 ML/MIN/1.73
GLUCOSE BLD-MCNC: 106 MG/DL (ref 74–99)
GLUCOSE URINE: NEGATIVE MG/DL
HCT VFR BLD CALC: 27.7 % (ref 34–48)
HEMOGLOBIN: 8.4 G/DL (ref 11.5–15.5)
IMMATURE GRANULOCYTES #: 0.06 E9/L
IMMATURE GRANULOCYTES %: 0.7 % (ref 0–5)
KETONES, URINE: NEGATIVE MG/DL
LACTIC ACID: 1.5 MMOL/L (ref 0.5–2.2)
LEUKOCYTE ESTERASE, URINE: NEGATIVE
LIPASE: 33 U/L (ref 13–60)
LYMPHOCYTES ABSOLUTE: 1.8 E9/L (ref 1.5–4)
LYMPHOCYTES RELATIVE PERCENT: 20.9 % (ref 20–42)
MCH RBC QN AUTO: 25.8 PG (ref 26–35)
MCHC RBC AUTO-ENTMCNC: 30.3 % (ref 32–34.5)
MCV RBC AUTO: 85.2 FL (ref 80–99.9)
MONOCYTES ABSOLUTE: 1.06 E9/L (ref 0.1–0.95)
MONOCYTES RELATIVE PERCENT: 12.3 % (ref 2–12)
NEUTROPHILS ABSOLUTE: 5.55 E9/L (ref 1.8–7.3)
NEUTROPHILS RELATIVE PERCENT: 64.2 % (ref 43–80)
NITRITE, URINE: NEGATIVE
PDW BLD-RTO: 14.7 FL (ref 11.5–15)
PH UA: 5.5 (ref 5–9)
PLATELET # BLD: 503 E9/L (ref 130–450)
PMV BLD AUTO: 9.5 FL (ref 7–12)
POTASSIUM REFLEX MAGNESIUM: 3.9 MMOL/L (ref 3.5–5)
PROTEIN UA: NEGATIVE MG/DL
RBC # BLD: 3.25 E12/L (ref 3.5–5.5)
RBC UA: NORMAL /HPF (ref 0–2)
SODIUM BLD-SCNC: 135 MMOL/L (ref 132–146)
SPECIFIC GRAVITY UA: 1.01 (ref 1–1.03)
TOTAL PROTEIN: 7.9 G/DL (ref 6.4–8.3)
UROBILINOGEN, URINE: 0.2 E.U./DL
WBC # BLD: 8.6 E9/L (ref 4.5–11.5)
WBC UA: NORMAL /HPF (ref 0–5)

## 2022-06-20 PROCEDURE — 85025 COMPLETE CBC W/AUTO DIFF WBC: CPT

## 2022-06-20 PROCEDURE — 2580000003 HC RX 258: Performed by: NURSE PRACTITIONER

## 2022-06-20 PROCEDURE — 6360000002 HC RX W HCPCS: Performed by: NURSE PRACTITIONER

## 2022-06-20 PROCEDURE — 83690 ASSAY OF LIPASE: CPT

## 2022-06-20 PROCEDURE — 6370000000 HC RX 637 (ALT 250 FOR IP): Performed by: NURSE PRACTITIONER

## 2022-06-20 PROCEDURE — 96374 THER/PROPH/DIAG INJ IV PUSH: CPT

## 2022-06-20 PROCEDURE — 80048 BASIC METABOLIC PNL TOTAL CA: CPT

## 2022-06-20 PROCEDURE — APPSS45 APP SPLIT SHARED TIME 31-45 MINUTES: Performed by: NURSE PRACTITIONER

## 2022-06-20 PROCEDURE — 6360000002 HC RX W HCPCS

## 2022-06-20 PROCEDURE — 99223 1ST HOSP IP/OBS HIGH 75: CPT | Performed by: FAMILY MEDICINE

## 2022-06-20 PROCEDURE — 6360000002 HC RX W HCPCS: Performed by: EMERGENCY MEDICINE

## 2022-06-20 PROCEDURE — 96375 TX/PRO/DX INJ NEW DRUG ADDON: CPT

## 2022-06-20 PROCEDURE — 81001 URINALYSIS AUTO W/SCOPE: CPT

## 2022-06-20 PROCEDURE — 6370000000 HC RX 637 (ALT 250 FOR IP): Performed by: FAMILY MEDICINE

## 2022-06-20 PROCEDURE — 74177 CT ABD & PELVIS W/CONTRAST: CPT

## 2022-06-20 PROCEDURE — 83605 ASSAY OF LACTIC ACID: CPT

## 2022-06-20 PROCEDURE — 80076 HEPATIC FUNCTION PANEL: CPT

## 2022-06-20 PROCEDURE — 99285 EMERGENCY DEPT VISIT HI MDM: CPT

## 2022-06-20 PROCEDURE — 1200000000 HC SEMI PRIVATE

## 2022-06-20 RX ORDER — ACETAMINOPHEN 650 MG/1
650 SUPPOSITORY RECTAL EVERY 6 HOURS PRN
Status: DISCONTINUED | OUTPATIENT
Start: 2022-06-20 | End: 2022-06-21 | Stop reason: SDUPTHER

## 2022-06-20 RX ORDER — CALCIUM CARBONATE 500(1250)
1 TABLET ORAL DAILY
Status: DISCONTINUED | OUTPATIENT
Start: 2022-06-20 | End: 2022-06-24 | Stop reason: HOSPADM

## 2022-06-20 RX ORDER — FENTANYL 25 UG/H
1 PATCH TRANSDERMAL
Status: ON HOLD | COMMUNITY
Start: 2022-06-15 | End: 2022-06-24 | Stop reason: HOSPADM

## 2022-06-20 RX ORDER — NALOXONE HYDROCHLORIDE 0.4 MG/ML
0.4 INJECTION, SOLUTION INTRAMUSCULAR; INTRAVENOUS; SUBCUTANEOUS PRN
Status: DISCONTINUED | OUTPATIENT
Start: 2022-06-20 | End: 2022-06-24 | Stop reason: HOSPADM

## 2022-06-20 RX ORDER — CITALOPRAM 20 MG/1
20 TABLET ORAL DAILY
Status: DISCONTINUED | OUTPATIENT
Start: 2022-06-21 | End: 2022-06-24 | Stop reason: HOSPADM

## 2022-06-20 RX ORDER — ONDANSETRON 2 MG/ML
4 INJECTION INTRAMUSCULAR; INTRAVENOUS EVERY 6 HOURS PRN
Status: DISCONTINUED | OUTPATIENT
Start: 2022-06-20 | End: 2022-06-24 | Stop reason: HOSPADM

## 2022-06-20 RX ORDER — SODIUM CHLORIDE 9 MG/ML
INJECTION, SOLUTION INTRAVENOUS PRN
Status: DISCONTINUED | OUTPATIENT
Start: 2022-06-20 | End: 2022-06-24 | Stop reason: HOSPADM

## 2022-06-20 RX ORDER — MORPHINE SULFATE 2 MG/ML
2 INJECTION, SOLUTION INTRAMUSCULAR; INTRAVENOUS EVERY 4 HOURS PRN
Status: DISCONTINUED | OUTPATIENT
Start: 2022-06-20 | End: 2022-06-24 | Stop reason: HOSPADM

## 2022-06-20 RX ORDER — PANTOPRAZOLE SODIUM 40 MG/1
40 TABLET, DELAYED RELEASE ORAL
Status: DISCONTINUED | OUTPATIENT
Start: 2022-06-21 | End: 2022-06-24 | Stop reason: HOSPADM

## 2022-06-20 RX ORDER — ACETAMINOPHEN 325 MG/1
650 TABLET ORAL EVERY 6 HOURS PRN
Status: DISCONTINUED | OUTPATIENT
Start: 2022-06-20 | End: 2022-06-21 | Stop reason: SDUPTHER

## 2022-06-20 RX ORDER — SODIUM CHLORIDE 0.9 % (FLUSH) 0.9 %
5-40 SYRINGE (ML) INJECTION PRN
Status: DISCONTINUED | OUTPATIENT
Start: 2022-06-20 | End: 2022-06-24 | Stop reason: HOSPADM

## 2022-06-20 RX ORDER — SODIUM CHLORIDE 0.9 % (FLUSH) 0.9 %
5-40 SYRINGE (ML) INJECTION EVERY 12 HOURS SCHEDULED
Status: DISCONTINUED | OUTPATIENT
Start: 2022-06-20 | End: 2022-06-24 | Stop reason: HOSPADM

## 2022-06-20 RX ORDER — CHOLECALCIFEROL (VITAMIN D3) 50 MCG
2000 TABLET ORAL 2 TIMES DAILY
Status: DISCONTINUED | OUTPATIENT
Start: 2022-06-20 | End: 2022-06-21

## 2022-06-20 RX ORDER — LEVOTHYROXINE SODIUM 0.03 MG/1
25 TABLET ORAL DAILY
Status: DISCONTINUED | OUTPATIENT
Start: 2022-06-20 | End: 2022-06-24 | Stop reason: HOSPADM

## 2022-06-20 RX ORDER — OXYCODONE HYDROCHLORIDE AND ACETAMINOPHEN 5; 325 MG/1; MG/1
1 TABLET ORAL EVERY 4 HOURS PRN
Status: DISCONTINUED | OUTPATIENT
Start: 2022-06-20 | End: 2022-06-21

## 2022-06-20 RX ORDER — ONDANSETRON 2 MG/ML
4 INJECTION INTRAMUSCULAR; INTRAVENOUS ONCE
Status: COMPLETED | OUTPATIENT
Start: 2022-06-20 | End: 2022-06-20

## 2022-06-20 RX ORDER — ONDANSETRON 4 MG/1
4 TABLET, ORALLY DISINTEGRATING ORAL EVERY 8 HOURS PRN
Status: DISCONTINUED | OUTPATIENT
Start: 2022-06-20 | End: 2022-06-24 | Stop reason: HOSPADM

## 2022-06-20 RX ORDER — BENZONATATE 100 MG/1
100 CAPSULE ORAL DAILY
Status: DISCONTINUED | OUTPATIENT
Start: 2022-06-20 | End: 2022-06-24 | Stop reason: HOSPADM

## 2022-06-20 RX ORDER — BENZONATATE 100 MG/1
1 CAPSULE ORAL DAILY
COMMUNITY
Start: 2022-06-10

## 2022-06-20 RX ORDER — ENOXAPARIN SODIUM 100 MG/ML
40 INJECTION SUBCUTANEOUS DAILY
Status: DISCONTINUED | OUTPATIENT
Start: 2022-06-20 | End: 2022-06-24 | Stop reason: HOSPADM

## 2022-06-20 RX ORDER — POLYETHYLENE GLYCOL 3350 17 G/17G
17 POWDER, FOR SOLUTION ORAL DAILY PRN
Status: DISCONTINUED | OUTPATIENT
Start: 2022-06-20 | End: 2022-06-24 | Stop reason: HOSPADM

## 2022-06-20 RX ORDER — DOCUSATE SODIUM 100 MG/1
100 CAPSULE, LIQUID FILLED ORAL 2 TIMES DAILY
Status: DISCONTINUED | OUTPATIENT
Start: 2022-06-20 | End: 2022-06-21

## 2022-06-20 RX ORDER — LACTULOSE 10 G/15ML
30 SOLUTION ORAL DAILY
COMMUNITY
Start: 2022-06-10

## 2022-06-20 RX ADMIN — LEVOTHYROXINE SODIUM 25 MCG: 25 TABLET ORAL at 18:01

## 2022-06-20 RX ADMIN — OXYCODONE AND ACETAMINOPHEN 1 TABLET: 5; 325 TABLET ORAL at 19:57

## 2022-06-20 RX ADMIN — ENOXAPARIN SODIUM 40 MG: 100 INJECTION SUBCUTANEOUS at 18:01

## 2022-06-20 RX ADMIN — MORPHINE SULFATE 2 MG: 2 INJECTION, SOLUTION INTRAMUSCULAR; INTRAVENOUS at 18:03

## 2022-06-20 RX ADMIN — ONDANSETRON 4 MG: 2 INJECTION INTRAMUSCULAR; INTRAVENOUS at 11:20

## 2022-06-20 RX ADMIN — HYDROMORPHONE HYDROCHLORIDE 0.5 MG: 1 INJECTION, SOLUTION INTRAMUSCULAR; INTRAVENOUS; SUBCUTANEOUS at 11:37

## 2022-06-20 RX ADMIN — CALCIUM 500 MG: 500 TABLET ORAL at 18:01

## 2022-06-20 RX ADMIN — Medication 10 ML: at 19:59

## 2022-06-20 RX ADMIN — BENZONATATE 100 MG: 100 CAPSULE ORAL at 18:14

## 2022-06-20 RX ADMIN — DOCUSATE SODIUM 100 MG: 100 CAPSULE, LIQUID FILLED ORAL at 19:58

## 2022-06-20 RX ADMIN — Medication 2000 UNITS: at 19:58

## 2022-06-20 ASSESSMENT — ENCOUNTER SYMPTOMS
ABDOMINAL PAIN: 1
PHOTOPHOBIA: 0
VOICE CHANGE: 0
BACK PAIN: 0
DIARRHEA: 0
TROUBLE SWALLOWING: 0
VOMITING: 0
WHEEZING: 0
COUGH: 1
NAUSEA: 1
EYE DISCHARGE: 0
SORE THROAT: 0
EYE REDNESS: 0
RHINORRHEA: 0

## 2022-06-20 ASSESSMENT — PAIN DESCRIPTION - ORIENTATION
ORIENTATION: RIGHT
ORIENTATION: RIGHT;LEFT
ORIENTATION: RIGHT

## 2022-06-20 ASSESSMENT — PAIN SCALES - GENERAL
PAINLEVEL_OUTOF10: 8
PAINLEVEL_OUTOF10: 10
PAINLEVEL_OUTOF10: 8
PAINLEVEL_OUTOF10: 10
PAINLEVEL_OUTOF10: 6
PAINLEVEL_OUTOF10: 8

## 2022-06-20 ASSESSMENT — PAIN DESCRIPTION - LOCATION
LOCATION: CHEST;BACK
LOCATION: HIP
LOCATION: HIP
LOCATION: CHEST;BACK
LOCATION: BACK
LOCATION: BACK

## 2022-06-20 ASSESSMENT — PAIN - FUNCTIONAL ASSESSMENT
PAIN_FUNCTIONAL_ASSESSMENT: PREVENTS OR INTERFERES SOME ACTIVE ACTIVITIES AND ADLS
PAIN_FUNCTIONAL_ASSESSMENT: ACTIVITIES ARE NOT PREVENTED
PAIN_FUNCTIONAL_ASSESSMENT: PREVENTS OR INTERFERES SOME ACTIVE ACTIVITIES AND ADLS
PAIN_FUNCTIONAL_ASSESSMENT: PREVENTS OR INTERFERES SOME ACTIVE ACTIVITIES AND ADLS

## 2022-06-20 ASSESSMENT — PAIN DESCRIPTION - DESCRIPTORS
DESCRIPTORS: STABBING
DESCRIPTORS: SHARP
DESCRIPTORS: STABBING
DESCRIPTORS: SHARP

## 2022-06-20 ASSESSMENT — PAIN DESCRIPTION - PAIN TYPE
TYPE: ACUTE PAIN
TYPE: ACUTE PAIN

## 2022-06-20 NOTE — ED NOTES
Patient attempted to urinate but was unable to produce enough sample at this time     Sandro Miramontes RN  06/20/22 8021

## 2022-06-20 NOTE — H&P
[facility]   History and Physical      CHIEF COMPLAINT:  Right leg pain    History of Present Illness:  66 y.o. female who presents from home with  and sister for evaluation of ongoing right hip and abdominal pain. Patient was seen in ER 05/31/22 for evaluation of abdominal pain that has been ongoing over the past month. She was diagnosed with constipation and discharged. She had also complained of right leg and chest pain. A CT abdomen showed bilateral lower lobe lung masses. A CTA pulmonary on 06/01 revealed multiple masses noted in the bilateral lungs some of which demonstrating central cavitation. She was scheduled to have a lung biopsy tomorrow. Patient states about 4 weeks ago she went to [facility] pain management for her back pain and was sent for a CT . She did not get a chance to get her results before having to go to ER for unbearable pain. Right leg pain is aggravated by weight bearing anf ambulation. Endorses extreme pain with palpation to back and right lower quadrant. Is unable to lie flat on her back due to pain. Associated symptoms are night sweats that began 2 weeks ago, anorexia, and urinary urgency at night. Informant(s) for H&P: patient    REVIEW OF SYSTEMS:  Denies CP, SOB subjective fever/chills, cough, congestion, n/v/d, ha, vision/hearing changes, wt changes, hot/cold flashes, other open skin lesions, constipation, dysuria/hematuria unless noted in HPI. Complete ROS performed with the patient and is otherwise negative.       PMH:  Past Medical History:   Diagnosis Date    Arthritis     Hyperlipidemia     Right shoulder pain        Surgical History:  Past Surgical History:   Procedure Laterality Date    COLONOSCOPY      EYE SURGERY Bilateral     Cataracts    HYSTERECTOMY      SHOULDER ARTHROSCOPY Right 9/5/2019    RIGHT SHOULDER ARTHROSCOPY ROTATOR CUFF REPAIR POSSIBLE BICEPS TENODESIS performed by July Barahona MD at [facility] SHOULDER ARTHROSCOPY Left 5/21/2020    LEFT SHOULDER ARTHROSCOPY, ROTATOR CUFF REPAIR BICEPS TENODESIS (ARTHREX, INTERSCALENE BLOCK) performed by Shaquille Lakhani MD at [address] URETEROSCOPY Left 09/14/2015       Medications Prior to Admission:    Prior to Admission medications    Medication Sig Start Date End Date Taking? Authorizing Provider   cephALEXin (KEFLEX) 500 MG capsule TAKE ONE CAPSULE BY MOUTH FOUR TIMES A DAY FOR 5 DAYS 6/4/22   Historical Provider, MD   docusate sodium (COLACE) 100 mg capsule Take 1 capsule by mouth 2 times daily 6/1/22   Hallie Castillo MD   polyethylene glycol [facility]) 17 GM/SCOOP powder Take 17 g by mouth daily as needed (constipation) 6/1/22 7/1/22  Hallie Castillo MD   levothyroxine (SYNTHROID) 25 MCG tablet Take 1 tablet by mouth Daily 5/25/22   Juanito Buchanan DO   methylPREDNISolone (MEDROL DOSEPACK) 4 MG tablet Take by mouth. 5/24/22   Juanito Buchanan DO   citalopram (CELEXA) 20 MG tablet Take 0.5 tablets by mouth daily for 6 days, THEN 1 tablet daily for 24 days.  5/16/22 6/15/22  Huseyin Tomlinson DO   azelastine (ASTELIN) 0.1 % nasal spray 1 spray by Nasal route 2 times daily Use in each nostril as directed 4/28/22   Juanito Buchanan DO   fluticasone [facility]) 50 MCG/ACT nasal spray 2 sprays by Each Nostril route daily 4/8/22   Juanito Buchanan DO   omeprazole (PRILOSEC) 20 MG delayed release capsule Take 20 mg by mouth daily    Historical Provider, MD   Evolocumab (REPATHA) 140 MG/ML SOSY INJECT 1ML INTO THE SKIN EVERY 14 DAYS 1/27/22   Juanito Buchanan DO   diclofenac (VOLTAREN) 50 MG EC tablet TAKE ONE TABLET BY MOUTH THREE TIMES A DAY WITH MEALS 11/11/21   Juanito Buchanan DO   aspirin 81 MG tablet Take 81 mg by mouth daily Hold/Dr Dior Diop    Historical Provider, MD   Cholecalciferol (VITAMIN D3) 2000 UNITS CAPS Take 2,000 Units by mouth 2 times daily    Historical Provider, MD   Multiple Vitamins-Minerals (MULTIVITAMIN & MINERAL PO) Take 1 tablet by mouth daily    Historical Provider, MD   calcium carbonate 600 MG TABS tablet Take 1 tablet by mouth daily    Historical Provider, MD   Omega-3 Fatty Acids (FISH OIL) 1000 MG CAPS Take 2,000 mg by mouth 2 times daily     Historical Provider, MD       Allergies:    Atorvastatin, Avelox [moxifloxacin], Codeine, Daypro [oxaprozin], Demerol hcl [meperidine], Levaquin [levofloxacin in d5w], Tape [adhesive tape], and Valium [diazepam]    Social History:    reports that she has never smoked. She has never used smokeless tobacco. She reports that she does not drink alcohol and does not use drugs. Family History:   family history includes Breast Cancer (age of onset: 48) in her niece; Breast Cancer (age of onset: 77) in her sister.      PHYSICAL EXAM:  Vitals:  /62   Pulse 72   Temp 97.8 °F (36.6 °C) (Temporal)   Resp 14   Ht 5' 4\" (1.626 m)   Wt 169 lb (76.7 kg)   SpO2 98%   BMI 29.01 kg/m²     General Appearance: alert and oriented to person, place and time and in no acute distress  Skin: warm and dry  Head: normocephalic and atraumatic  Eyes: pupils equal, round, and reactive to light, extraocular eye movements intact, conjunctivae normal  Neck: neck supple and non tender without mass   Pulmonary/Chest: clear to auscultation bilaterally- no wheezes, rales or rhonchi, normal air movement, no respiratory distress  Cardiovascular: normal rate, normal S1 and S2 and no carotid bruits  Abdomen: soft, tender, non-distended, normal bowel sounds, no masses or organomegaly  Extremities: no cyanosis, no clubbing and no edema  Neurologic: no cranial nerve deficit and speech normal    LABS:  Recent Labs     06/20/22  1115      K 3.9   CL 99   CO2 22   BUN 14   CREATININE 0.6   GLUCOSE 106*   CALCIUM 9.5       Recent Labs     06/20/22  1115   WBC 8.6   RBC 3.25*   HGB 8.4*   HCT 27.7*   MCV 85.2   MCH 25.8*   MCHC 30.3*   RDW 14.7   *   MPV 9.5       No results for input(s): POCGLU in the last 72 hours. I reviewed all labs and diagnostic images        Radiology: CT ABDOMEN PELVIS W IV CONTRAST Additional Contrast? None    Result Date: 6/20/2022  EXAMINATION: CT OF THE ABDOMEN AND PELVIS WITH CONTRAST 6/20/2022 1:05 pm TECHNIQUE: CT of the abdomen and pelvis was performed with the administration of intravenous contrast. Multiplanar reformatted images are provided for review. Automated exposure control, iterative reconstruction, and/or weight based adjustment of the mA/kV was utilized to reduce the radiation dose to as low as reasonably achievable. COMPARISON: 05/31/2022 HISTORY: ORDERING SYSTEM PROVIDED HISTORY: abdominal TECHNOLOGIST PROVIDED HISTORY: Additional Contrast?->None Reason for exam:->abdominal Decision Support Exception - unselect if not a suspected or confirmed emergency medical condition->Emergency Medical Condition (MA) FINDINGS: Lower Chest: Patchy consolidative infiltrate identified the lung bases bilaterally as well as the right middle lobe concerning for multi lobar pneumonia. Organs: The liver is homogeneous in appearance. No stones in the gallbladder. The spleen is unremarkable. Parapelvic cysts in the kidneys bilaterally. Adrenal glands are unremarkable. Symmetric enhancement of the renal parenchyma. Pancreas is homogeneous. GI/Bowel: The stomach is unremarkable. The small bowel is within normal limits. No mucosal abnormality. Stool seen scattered diffusely throughout the colon. No evidence of obvious obstruction or obstructing lesion. Pelvis: Pelvic organs reveal no wall thickening identified of the bladder. The uterus is been surgically removed. Diverticulosis with no evidence of diverticulitis. Peritoneum/Retroperitoneum: There is no abdominal retroperitoneal lymphadenopathy with mild to moderate atherosclerotic disease within the abdominal aorta and iliac vessels. There is no free fluid or free air.   No abnormal mass or fluid collections

## 2022-06-20 NOTE — ED PROVIDER NOTES
66y.o. year old female presenting to the emergency room with concerns of right leg pain, abdominal tenderness worsening this week . Patient reports that symptom's onset 3 weeks prior after diagnosis of lung cancer. Worsen with palpation, walking. Improves with nothing. Severity of 9 out of 10 pain, with radiation from anterior right hip to right leg. Symptoms are constant in timing. Symptoms described as aching. Patient reports  associated symptoms of abdominal pain, nausea. Patient in  no acute distress. Patient with recent diagnosis of lung cancer, scheduled for biopsy tomorrow, seeing Dr. Alber Thomas. denies any fever, spinal injection, blood thinner,trauma, bloody stools today, urinary retention or incontinence. Chief Complaint   Patient presents with    Leg Pain     Hip pain for 2-3 weeks    Difficulty Walking    Abdominal Pain       Review of Systems   Constitutional: Negative for chills and fever. HENT: Negative for congestion, rhinorrhea, sore throat, trouble swallowing and voice change. Eyes: Negative for photophobia, discharge, redness and visual disturbance. Respiratory: Positive for cough. Negative for wheezing. Cardiovascular: Negative for palpitations and leg swelling. Gastrointestinal: Positive for abdominal pain and nausea. Negative for diarrhea and vomiting. Genitourinary: Negative for dysuria, hematuria, urgency, vaginal bleeding, vaginal discharge and vaginal pain. Musculoskeletal: Negative for arthralgias, back pain, neck pain and neck stiffness. Skin: Negative for rash and wound. Neurological: Negative for dizziness, syncope, weakness, numbness and headaches. Psychiatric/Behavioral: Negative for behavioral problems and confusion. The patient is nervous/anxious. Physical Exam  Vitals reviewed. Constitutional:       General: She is not in acute distress. Appearance: Normal appearance. HENT:      Head: Normocephalic.       Right Ear: External ear normal.      Left Ear: External ear normal.      Nose: Nose normal.      Mouth/Throat:      Mouth: Mucous membranes are moist.      Pharynx: No oropharyngeal exudate. Eyes:      General: No scleral icterus. Right eye: No discharge. Left eye: No discharge. Conjunctiva/sclera: Conjunctivae normal.   Cardiovascular:      Rate and Rhythm: Normal rate and regular rhythm. Heart sounds: No friction rub. No gallop. Pulmonary:      Effort: Pulmonary effort is normal. No tachypnea, accessory muscle usage, respiratory distress or retractions. Breath sounds: No stridor. Rales present. No rhonchi. Abdominal:      General: There is no distension. Palpations: Abdomen is soft. Tenderness: There is abdominal tenderness in the right lower quadrant and suprapubic area. There is no guarding or rebound. Negative signs include Pate's sign. Musculoskeletal:         General: No deformity. Cervical back: Normal range of motion. No rigidity or tenderness. Lumbar back: Tenderness present. Back:       Right hip: Tenderness present. Comments: Fatty tissue lipoma    Skin:     General: Skin is warm. Coloration: Skin is not jaundiced. Neurological:      Mental Status: She is alert and oriented to person, place, and time. Sensory: No sensory deficit. Motor: No weakness. Psychiatric:         Mood and Affect: Mood is anxious. Behavior: Behavior normal.          Procedures       MDM  Number of Diagnoses or Management Options  Diagnosis management comments: 67-year-old female presenting to the emergency department with difficulty ambulating, right leg pain, abdominal pain. Recent diagnosis of lung cancer, scheduled for lung biopsy tomorrow morning. Follows with Dr. Hilario Orellana. ER UA negative. Electrolyte abnormality noted. Hemoglobin noted to have decreased since last, rectal exam performed with chaperone nurse Iva Villar in room, positive.   CT abdomen pelvis demonstrates constipation and previously visualized right pubic symphysis lytic lesion. Spoke to Dr. Michael Mathis, discussed patient recommended patient be admitted for pain control, with consult to palliative for pain control, IR for lung biopsy, and has agreed to consult. Spoke to Dr. Giselle Palacios, discussed patient will plan to admit at this time. Amount and/or Complexity of Data Reviewed  Decide to obtain previous medical records or to obtain history from someone other than the patient: yes         ED Course as of 06/20/22 2305 Mon Jun 20, 2022   1530 Spoke to Dr. Angela Al, discussed patient, recommended that patient be admitted with consult to IR for previously scheduled lung biopsy, palliative for pain control. [CHERELLE]   2593 Spoke to Dr. Rigoberto Richardson, discussed patient, accepted patient for admission at this time. [CHERELLE]      ED Course User Index  [CHERELLE] Gautam He DO        ED Course as of 06/20/22 2305 Mon Jun 20, 2022   1530 Spoke to Dr. Angela Al, discussed patient, recommended that patient be admitted with consult to IR for previously scheduled lung biopsy, palliative for pain control. [CHERELLE]   3173 Spoke to Dr. Rigoberto Richardson, discussed patient, accepted patient for admission at this time. [CHERELLE]      ED Course User Index  [CHERELLE] Gautam He DO       --------------------------------------------- PAST HISTORY ---------------------------------------------  Past Medical History:  has a past medical history of Arthritis, Hyperlipidemia, and Right shoulder pain. Past Surgical History:  has a past surgical history that includes Tubal ligation; Colonoscopy; Ureteroscopy (Left, 09/14/2015); eye surgery (Bilateral); Hysterectomy; Shoulder arthroscopy (Right, 9/5/2019); and Shoulder arthroscopy (Left, 5/21/2020). Social History:  reports that she has never smoked. She has never used smokeless tobacco. She reports that she does not drink alcohol and does not use drugs.     Family History: family history includes Breast Cancer (age of onset: 48) in her niece; Breast Cancer (age of onset: 77) in her sister. The patients home medications have been reviewed.     Allergies: Atorvastatin, Avelox [moxifloxacin], Codeine, Daypro [oxaprozin], Demerol hcl [meperidine], Levaquin [levofloxacin in d5w], Tape [adhesive tape], and Valium [diazepam]    -------------------------------------------------- RESULTS -------------------------------------------------    LABS:  Results for orders placed or performed during the hospital encounter of 06/20/22   CBC with Auto Differential   Result Value Ref Range    WBC 8.6 4.5 - 11.5 E9/L    RBC 3.25 (L) 3.50 - 5.50 E12/L    Hemoglobin 8.4 (L) 11.5 - 15.5 g/dL    Hematocrit 27.7 (L) 34.0 - 48.0 %    MCV 85.2 80.0 - 99.9 fL    MCH 25.8 (L) 26.0 - 35.0 pg    MCHC 30.3 (L) 32.0 - 34.5 %    RDW 14.7 11.5 - 15.0 fL    Platelets 855 (H) 434 - 450 E9/L    MPV 9.5 7.0 - 12.0 fL    Neutrophils % 64.2 43.0 - 80.0 %    Immature Granulocytes % 0.7 0.0 - 5.0 %    Lymphocytes % 20.9 20.0 - 42.0 %    Monocytes % 12.3 (H) 2.0 - 12.0 %    Eosinophils % 1.3 0.0 - 6.0 %    Basophils % 0.6 0.0 - 2.0 %    Neutrophils Absolute 5.55 1.80 - 7.30 E9/L    Immature Granulocytes # 0.06 E9/L    Lymphocytes Absolute 1.80 1.50 - 4.00 E9/L    Monocytes Absolute 1.06 (H) 0.10 - 0.95 E9/L    Eosinophils Absolute 0.11 0.05 - 0.50 E9/L    Basophils Absolute 0.05 0.00 - 0.20 F0/E   Basic Metabolic Panel w/ Reflex to MG   Result Value Ref Range    Sodium 135 132 - 146 mmol/L    Potassium reflex Magnesium 3.9 3.5 - 5.0 mmol/L    Chloride 99 98 - 107 mmol/L    CO2 22 22 - 29 mmol/L    Anion Gap 14 7 - 16 mmol/L    Glucose 106 (H) 74 - 99 mg/dL    BUN 14 6 - 23 mg/dL    CREATININE 0.6 0.5 - 1.0 mg/dL    GFR Non-African American >60 >=60 mL/min/1.73    GFR African American >60     Calcium 9.5 8.6 - 10.2 mg/dL   Hepatic Function Panel   Result Value Ref Range    Total Protein 7.9 6.4 - 8.3 g/dL    Albumin 3.6 3.5 - 5.2 g/dL    Alkaline Phosphatase 78 35 - 104 U/L    ALT 21 0 - 32 U/L    AST 19 0 - 31 U/L    Total Bilirubin 0.5 0.0 - 1.2 mg/dL    Bilirubin, Direct <0.2 0.0 - 0.3 mg/dL    Bilirubin, Indirect see below 0.0 - 1.0 mg/dL   Lipase   Result Value Ref Range    Lipase 33 13 - 60 U/L   Lactic Acid   Result Value Ref Range    Lactic Acid 1.5 0.5 - 2.2 mmol/L   Urinalysis with Microscopic   Result Value Ref Range    Color, UA Yellow Straw/Yellow    Clarity, UA Clear Clear    Glucose, Ur Negative Negative mg/dL    Bilirubin Urine Negative Negative    Ketones, Urine Negative Negative mg/dL    Specific Gravity, UA 1.010 1.005 - 1.030    Blood, Urine Negative Negative    pH, UA 5.5 5.0 - 9.0    Protein, UA Negative Negative mg/dL    Urobilinogen, Urine 0.2 <2.0 E.U./dL    Nitrite, Urine Negative Negative    Leukocyte Esterase, Urine Negative Negative    WBC, UA NONE 0 - 5 /HPF    RBC, UA NONE 0 - 2 /HPF    Epithelial Cells, UA RARE /HPF    Bacteria, UA NONE SEEN None Seen /HPF       RADIOLOGY:  CT ABDOMEN PELVIS W IV CONTRAST Additional Contrast? None   Final Result   Airspace disease identified lower lung fields bilaterally. There is stool   seen scattered throughout the colon to suggest clinical presentation of   constipation. Stable abnormal appearance of the right pubic symphysis with lytic lesion   identified concerning for malignancy or metastatic disease. IR Interventional Radiology Procedure Request    (Results Pending)             ------------------------- NURSING NOTES AND VITALS REVIEWED ---------------------------  Date / Time Roomed:  6/20/2022 10:38 AM  ED Bed Assignment:  [id number]    The nursing notes within the ED encounter and vital signs as below have been reviewed.      Patient Vitals for the past 24 hrs:   BP Temp Temp src Pulse Resp SpO2 Height Weight   06/20/22 2045 (!) 112/43 100.2 °F (37.9 °C) Oral 76 16 -- -- --   06/20/22 2027 -- -- -- -- 16 -- -- --   06/20/22 1957 -- -- -- -- 16 -- -- --   06/20/22 1801

## 2022-06-20 NOTE — PROGRESS NOTES
Admission database completed to best of this RN's ability. Care plan and education initiated. Pt from home with . Denies any use of DME or HHC services prior to admission. Pt states that d/t pain she has not been able to ambulate and can only lay on R side. Recent diagnosis of lung CA follows with Dr. Tamiko Dillon.

## 2022-06-21 ENCOUNTER — APPOINTMENT (OUTPATIENT)
Dept: GENERAL RADIOLOGY | Age: 72
DRG: 180 | End: 2022-06-21
Payer: MEDICARE

## 2022-06-21 ENCOUNTER — APPOINTMENT (OUTPATIENT)
Dept: CT IMAGING | Age: 72
DRG: 180 | End: 2022-06-21
Payer: MEDICARE

## 2022-06-21 LAB
ANION GAP SERPL CALCULATED.3IONS-SCNC: 11 MMOL/L (ref 7–16)
BASOPHILS ABSOLUTE: 0.05 E9/L (ref 0–0.2)
BASOPHILS RELATIVE PERCENT: 0.5 % (ref 0–2)
BUN BLDV-MCNC: 13 MG/DL (ref 6–23)
CALCIUM SERPL-MCNC: 9.3 MG/DL (ref 8.6–10.2)
CHLORIDE BLD-SCNC: 100 MMOL/L (ref 98–107)
CO2: 23 MMOL/L (ref 22–29)
CREAT SERPL-MCNC: 0.7 MG/DL (ref 0.5–1)
EOSINOPHILS ABSOLUTE: 0.18 E9/L (ref 0.05–0.5)
EOSINOPHILS RELATIVE PERCENT: 1.9 % (ref 0–6)
FERRITIN: 214 NG/ML
FOLATE: 19 NG/ML (ref 4.8–24.2)
GFR AFRICAN AMERICAN: >60
GFR NON-AFRICAN AMERICAN: >60 ML/MIN/1.73
GLUCOSE BLD-MCNC: 106 MG/DL (ref 74–99)
HCT VFR BLD CALC: 25.2 % (ref 34–48)
HCT VFR BLD CALC: 26.2 % (ref 34–48)
HEMOGLOBIN: 7.7 G/DL (ref 11.5–15.5)
IMMATURE GRANULOCYTES #: 0.05 E9/L
IMMATURE GRANULOCYTES %: 0.5 % (ref 0–5)
IMMATURE RETIC FRACT: 31.8 % (ref 3–15.9)
INR BLD: 1.6
IRON SATURATION: 6 % (ref 15–50)
IRON: 14 MCG/DL (ref 37–145)
LACTATE DEHYDROGENASE: 244 U/L (ref 135–214)
LYMPHOCYTES ABSOLUTE: 2.31 E9/L (ref 1.5–4)
LYMPHOCYTES RELATIVE PERCENT: 24.7 % (ref 20–42)
MCH RBC QN AUTO: 25.9 PG (ref 26–35)
MCHC RBC AUTO-ENTMCNC: 30.6 % (ref 32–34.5)
MCV RBC AUTO: 84.8 FL (ref 80–99.9)
MONOCYTES ABSOLUTE: 1.05 E9/L (ref 0.1–0.95)
MONOCYTES RELATIVE PERCENT: 11.2 % (ref 2–12)
NEUTROPHILS ABSOLUTE: 5.71 E9/L (ref 1.8–7.3)
NEUTROPHILS RELATIVE PERCENT: 61.2 % (ref 43–80)
PDW BLD-RTO: 14.6 FL (ref 11.5–15)
PLATELET # BLD: 487 E9/L (ref 130–450)
PMV BLD AUTO: 9.4 FL (ref 7–12)
POTASSIUM REFLEX MAGNESIUM: 4.3 MMOL/L (ref 3.5–5)
PROTHROMBIN TIME: 17 SEC (ref 9.3–12.4)
RBC # BLD: 2.97 E12/L (ref 3.5–5.5)
RETIC HGB EQUIVALENT: 21.4 PG (ref 28.2–36.6)
RETICULOCYTE ABSOLUTE COUNT: 0.09 E12/L
RETICULOCYTE COUNT PCT: 2.8 % (ref 0.4–1.9)
SODIUM BLD-SCNC: 134 MMOL/L (ref 132–146)
TOTAL IRON BINDING CAPACITY: 242 MCG/DL (ref 250–450)
VITAMIN B-12: 718 PG/ML (ref 211–946)
VITAMIN D 25-HYDROXY: 96 NG/ML (ref 30–100)
WBC # BLD: 9.4 E9/L (ref 4.5–11.5)

## 2022-06-21 PROCEDURE — 82607 VITAMIN B-12: CPT

## 2022-06-21 PROCEDURE — 83540 ASSAY OF IRON: CPT

## 2022-06-21 PROCEDURE — 32408 CORE NDL BX LNG/MED PERQ: CPT

## 2022-06-21 PROCEDURE — 88341 IMHCHEM/IMCYTCHM EA ADD ANTB: CPT

## 2022-06-21 PROCEDURE — 0BBJ3ZX EXCISION OF LEFT LOWER LUNG LOBE, PERCUTANEOUS APPROACH, DIAGNOSTIC: ICD-10-PCS | Performed by: INTERNAL MEDICINE

## 2022-06-21 PROCEDURE — 88305 TISSUE EXAM BY PATHOLOGIST: CPT

## 2022-06-21 PROCEDURE — 71250 CT THORAX DX C-: CPT

## 2022-06-21 PROCEDURE — 85045 AUTOMATED RETICULOCYTE COUNT: CPT

## 2022-06-21 PROCEDURE — 2580000003 HC RX 258: Performed by: NURSE PRACTITIONER

## 2022-06-21 PROCEDURE — 85025 COMPLETE CBC W/AUTO DIFF WBC: CPT

## 2022-06-21 PROCEDURE — 83550 IRON BINDING TEST: CPT

## 2022-06-21 PROCEDURE — APPSS30 APP SPLIT SHARED TIME 16-30 MINUTES: Performed by: NURSE PRACTITIONER

## 2022-06-21 PROCEDURE — 82728 ASSAY OF FERRITIN: CPT

## 2022-06-21 PROCEDURE — 82746 ASSAY OF FOLIC ACID SERUM: CPT

## 2022-06-21 PROCEDURE — 36415 COLL VENOUS BLD VENIPUNCTURE: CPT

## 2022-06-21 PROCEDURE — 6370000000 HC RX 637 (ALT 250 FOR IP): Performed by: FAMILY MEDICINE

## 2022-06-21 PROCEDURE — 85610 PROTHROMBIN TIME: CPT

## 2022-06-21 PROCEDURE — 6370000000 HC RX 637 (ALT 250 FOR IP): Performed by: NURSE PRACTITIONER

## 2022-06-21 PROCEDURE — 82306 VITAMIN D 25 HYDROXY: CPT

## 2022-06-21 PROCEDURE — 80048 BASIC METABOLIC PNL TOTAL CA: CPT

## 2022-06-21 PROCEDURE — 71045 X-RAY EXAM CHEST 1 VIEW: CPT

## 2022-06-21 PROCEDURE — 88360 TUMOR IMMUNOHISTOCHEM/MANUAL: CPT

## 2022-06-21 PROCEDURE — 6360000002 HC RX W HCPCS: Performed by: NURSE PRACTITIONER

## 2022-06-21 PROCEDURE — 99233 SBSQ HOSP IP/OBS HIGH 50: CPT | Performed by: FAMILY MEDICINE

## 2022-06-21 PROCEDURE — 6360000002 HC RX W HCPCS: Performed by: RADIOLOGY

## 2022-06-21 PROCEDURE — 2709999900 CT GUIDED NEEDLE PLACEMENT

## 2022-06-21 PROCEDURE — 83615 LACTATE (LD) (LDH) ENZYME: CPT

## 2022-06-21 PROCEDURE — 88342 IMHCHEM/IMCYTCHM 1ST ANTB: CPT

## 2022-06-21 PROCEDURE — 99223 1ST HOSP IP/OBS HIGH 75: CPT | Performed by: NURSE PRACTITIONER

## 2022-06-21 PROCEDURE — 1200000000 HC SEMI PRIVATE

## 2022-06-21 RX ORDER — FENTANYL CITRATE 50 UG/ML
INJECTION, SOLUTION INTRAMUSCULAR; INTRAVENOUS
Status: COMPLETED | OUTPATIENT
Start: 2022-06-21 | End: 2022-06-21

## 2022-06-21 RX ORDER — SENNA AND DOCUSATE SODIUM 50; 8.6 MG/1; MG/1
1 TABLET, FILM COATED ORAL 2 TIMES DAILY
Status: DISCONTINUED | OUTPATIENT
Start: 2022-06-21 | End: 2022-06-22

## 2022-06-21 RX ORDER — BISACODYL 10 MG
10 SUPPOSITORY, RECTAL RECTAL DAILY PRN
Status: DISCONTINUED | OUTPATIENT
Start: 2022-06-21 | End: 2022-06-24 | Stop reason: HOSPADM

## 2022-06-21 RX ORDER — OXYCODONE HYDROCHLORIDE 5 MG/1
10 TABLET ORAL EVERY 4 HOURS PRN
Status: DISCONTINUED | OUTPATIENT
Start: 2022-06-21 | End: 2022-06-23

## 2022-06-21 RX ORDER — ACETAMINOPHEN 325 MG/1
650 TABLET ORAL EVERY 6 HOURS PRN
Status: DISCONTINUED | OUTPATIENT
Start: 2022-06-21 | End: 2022-06-24 | Stop reason: HOSPADM

## 2022-06-21 RX ORDER — OXYCODONE HYDROCHLORIDE 15 MG/1
15 TABLET ORAL EVERY 4 HOURS PRN
Status: DISCONTINUED | OUTPATIENT
Start: 2022-06-21 | End: 2022-06-23

## 2022-06-21 RX ORDER — ACETAMINOPHEN 650 MG/1
650 SUPPOSITORY RECTAL EVERY 6 HOURS PRN
Status: DISCONTINUED | OUTPATIENT
Start: 2022-06-21 | End: 2022-06-24 | Stop reason: HOSPADM

## 2022-06-21 RX ADMIN — Medication 10 ML: at 20:09

## 2022-06-21 RX ADMIN — CITALOPRAM HYDROBROMIDE 20 MG: 20 TABLET ORAL at 11:09

## 2022-06-21 RX ADMIN — FENTANYL CITRATE 50 MCG: 50 INJECTION, SOLUTION INTRAMUSCULAR; INTRAVENOUS at 09:53

## 2022-06-21 RX ADMIN — DOCUSATE SODIUM 50 MG AND SENNOSIDES 8.6 MG 1 TABLET: 8.6; 5 TABLET, FILM COATED ORAL at 14:17

## 2022-06-21 RX ADMIN — DOCUSATE SODIUM 100 MG: 100 CAPSULE, LIQUID FILLED ORAL at 11:09

## 2022-06-21 RX ADMIN — OXYCODONE 15 MG: 15 TABLET ORAL at 19:26

## 2022-06-21 RX ADMIN — ENOXAPARIN SODIUM 40 MG: 100 INJECTION SUBCUTANEOUS at 18:34

## 2022-06-21 RX ADMIN — Medication 2000 UNITS: at 11:09

## 2022-06-21 RX ADMIN — ONDANSETRON 4 MG: 2 INJECTION INTRAMUSCULAR; INTRAVENOUS at 17:48

## 2022-06-21 RX ADMIN — BENZONATATE 100 MG: 100 CAPSULE ORAL at 11:09

## 2022-06-21 RX ADMIN — BISACODYL 10 MG: 10 SUPPOSITORY RECTAL at 14:23

## 2022-06-21 RX ADMIN — OXYCODONE AND ACETAMINOPHEN 1 TABLET: 5; 325 TABLET ORAL at 12:44

## 2022-06-21 RX ADMIN — LEVOTHYROXINE SODIUM 25 MCG: 25 TABLET ORAL at 06:50

## 2022-06-21 RX ADMIN — Medication 10 ML: at 11:10

## 2022-06-21 RX ADMIN — CALCIUM 500 MG: 500 TABLET ORAL at 11:10

## 2022-06-21 RX ADMIN — OXYCODONE 15 MG: 15 TABLET ORAL at 14:17

## 2022-06-21 RX ADMIN — FENTANYL CITRATE 50 MCG: 50 INJECTION, SOLUTION INTRAMUSCULAR; INTRAVENOUS at 10:05

## 2022-06-21 RX ADMIN — PANTOPRAZOLE SODIUM 40 MG: 40 TABLET, DELAYED RELEASE ORAL at 06:50

## 2022-06-21 RX ADMIN — POLYETHYLENE GLYCOL 3350 17 G: 17 POWDER, FOR SOLUTION ORAL at 11:10

## 2022-06-21 RX ADMIN — OXYCODONE AND ACETAMINOPHEN 1 TABLET: 5; 325 TABLET ORAL at 02:44

## 2022-06-21 RX ADMIN — DOCUSATE SODIUM 50 MG AND SENNOSIDES 8.6 MG 1 TABLET: 8.6; 5 TABLET, FILM COATED ORAL at 20:09

## 2022-06-21 ASSESSMENT — PAIN SCALES - GENERAL
PAINLEVEL_OUTOF10: 8
PAINLEVEL_OUTOF10: 8
PAINLEVEL_OUTOF10: 10
PAINLEVEL_OUTOF10: 5
PAINLEVEL_OUTOF10: 8
PAINLEVEL_OUTOF10: 6
PAINLEVEL_OUTOF10: 4
PAINLEVEL_OUTOF10: 7
PAINLEVEL_OUTOF10: 6

## 2022-06-21 ASSESSMENT — PAIN DESCRIPTION - DESCRIPTORS
DESCRIPTORS: SHARP
DESCRIPTORS: ACHING;BURNING
DESCRIPTORS: ACHING;BURNING
DESCRIPTORS: ACHING
DESCRIPTORS: SHARP
DESCRIPTORS: SHARP
DESCRIPTORS: ACHING;BURNING
DESCRIPTORS: ACHING;BURNING

## 2022-06-21 ASSESSMENT — PAIN - FUNCTIONAL ASSESSMENT
PAIN_FUNCTIONAL_ASSESSMENT: PREVENTS OR INTERFERES SOME ACTIVE ACTIVITIES AND ADLS
PAIN_FUNCTIONAL_ASSESSMENT: PREVENTS OR INTERFERES SOME ACTIVE ACTIVITIES AND ADLS
PAIN_FUNCTIONAL_ASSESSMENT: ACTIVITIES ARE NOT PREVENTED
PAIN_FUNCTIONAL_ASSESSMENT: PREVENTS OR INTERFERES SOME ACTIVE ACTIVITIES AND ADLS
PAIN_FUNCTIONAL_ASSESSMENT: NONE - DENIES PAIN
PAIN_FUNCTIONAL_ASSESSMENT: ACTIVITIES ARE NOT PREVENTED
PAIN_FUNCTIONAL_ASSESSMENT: PREVENTS OR INTERFERES SOME ACTIVE ACTIVITIES AND ADLS

## 2022-06-21 ASSESSMENT — PAIN DESCRIPTION - LOCATION
LOCATION: CHEST
LOCATION: HIP
LOCATION: CHEST
LOCATION: BACK
LOCATION: CHEST
LOCATION: HIP
LOCATION: CHEST
LOCATION: BACK

## 2022-06-21 ASSESSMENT — PAIN DESCRIPTION - ORIENTATION
ORIENTATION: RIGHT
ORIENTATION: RIGHT
ORIENTATION: LEFT;RIGHT

## 2022-06-21 ASSESSMENT — PAIN DESCRIPTION - PAIN TYPE
TYPE: ACUTE PAIN

## 2022-06-21 NOTE — PLAN OF CARE
Problem: Safety - Adult  Goal: Free from fall injury  6/21/2022 1631 by Domenica Hardwick RN  Outcome: Progressing  6/21/2022 0526 by Krystle Ford RN  Outcome: Progressing     Problem: Pain  Goal: Verbalizes/displays adequate comfort level or baseline comfort level  6/21/2022 1631 by Domenica Hardwick RN  Outcome: Progressing  6/21/2022 0526 by Krystle Ford RN  Outcome: Progressing

## 2022-06-21 NOTE — CONSULTS
[facility]  Hematology/Oncology  Consult      Patient Name: Mahad Weldon  YOB: 1950  PCP: Silvino Brown DO   Referring Provider:      Reason for Consultation:   Chief Complaint   Patient presents with    Leg Pain     Hip pain for 2-3 weeks    Difficulty Walking    Abdominal Pain        History of Present Illness: This is a 29-year-old female patient with a PMH of HLD, GERD, hypothyroidism, and depression who follows with Dr. Cezar Mcconnell. She was initially seen on 6/10/2022 for consult after after being seen in the ED a couple weeks before that and when she was found to have normocytic anemia with slight neutrophilic leukocytosis. CT chest no mediastinal lymphadenopathy however there were multiple large masses in the bilateral lung fields some of them was central cavitation largest in the left lower lobe measuring up to 7.5 cm. CTAP with a 2 cm lytic lesion on the right side of the symphysis pubis with a normal liver, spleen, adrenal glands, pancreas, and gallbladder. Her  230, with a normal CEA and CA 15-3. Patient is to have a CT-guided biopsy of the largest lung lesion for tissue diagnosis. Patient presented to the ED for current admission for evaluation of right leg pain, abdominal tenderness worsening this past week. CTAP with airspace disease in the left lower lung bilaterally. Constipation noted. Right pubic symphysis lytic lesion again identified. Patient is scheduled for a CT-guided biopsy of the lung while admitted. Palliative has been consulted for pain management. CBC with an Hgb of 7.7 MCV 84.8. Consultation for concerns of new lung cancer with worsening leg pain and drop of hemoglobin. Review of systems: Over 10 systems reviewed and all were negative except as mentioned above.       Diagnostic Data:     Past Medical History:   Diagnosis Date    Arthritis     Hyperlipidemia     Right shoulder pain        Patient Active Problem List    Diagnosis Date Noted    Anemia due to gastrointestinal blood loss 06/20/2022    Intractable pain 06/20/2022    Right hip pain 06/20/2022    Lung mass 06/20/2022    Thyroid disease 06/20/2022    Gastroesophageal reflux disease without esophagitis     Anxiety     Hiatal hernia     Chronic left shoulder pain 01/25/2019    Chest pain 02/20/2018    Hyperlipidemia LDL goal <100 02/20/2018    Acute cystitis 02/20/2018        Past Surgical History:   Procedure Laterality Date    COLONOSCOPY      EYE SURGERY Bilateral     Cataracts    HYSTERECTOMY (CERVIX STATUS UNKNOWN)      SHOULDER ARTHROSCOPY Right 9/5/2019    RIGHT SHOULDER ARTHROSCOPY ROTATOR CUFF REPAIR POSSIBLE BICEPS TENODESIS performed by René Taylor MD at [address] [facility] ARTHROSCOPY Left 5/21/2020    LEFT SHOULDER ARTHROSCOPY, ROTATOR CUFF REPAIR BICEPS TENODESIS (ARTHREX, INTERSCALENE BLOCK) performed by René Taylor MD at [address] [facility] URETEROSCOPY Left 09/14/2015       Family History  Family History   Problem Relation Age of Onset    Breast Cancer Sister 77    Breast Cancer Niece 48       Social History    TOBACCO:   reports that she has never smoked. She has never used smokeless tobacco.  ETOH:   reports no history of alcohol use. Home Medications  Prior to Admission medications    Medication Sig Start Date End Date Taking? Authorizing Provider   lactulose (CHRONULAC) 10 GM/15ML solution Take 30 mLs by mouth daily 6/10/22   Historical Provider, MD   fentaNYL (DURAGESIC) 25 MCG/HR Place 1 patch onto the skin every 72 hours.  6/15/22   Historical Provider, MD   benzonatate (TESSALON) 100 MG capsule Take 1 capsule by mouth daily 6/10/22   Historical Provider, MD   cephALEXin (KEFLEX) 500 MG capsule TAKE ONE CAPSULE BY MOUTH FOUR TIMES A DAY FOR 5 DAYS  Patient not taking: Reported on 6/20/2022 6/4/22   Historical Provider, MD   docusate sodium (COLACE) 100 mg capsule Take 1 capsule by mouth 2 times daily  Patient not taking: Reported on 6/20/2022 6/1/22   Durell Shone, MD   polyethylene glycol [facility]) 17 GM/SCOOP powder Take 17 g by mouth daily as needed (constipation) 6/1/22 7/1/22  Durell Shone, MD   levothyroxine (SYNTHROID) 25 MCG tablet Take 1 tablet by mouth Daily 5/25/22   Eris Buchanan DO   methylPREDNISolone (MEDROL DOSEPACK) 4 MG tablet Take by mouth. Patient not taking: Reported on 6/20/2022 5/24/22   Eris Buchanan DO   citalopram (CELEXA) 20 MG tablet Take 0.5 tablets by mouth daily for 6 days, THEN 1 tablet daily for 24 days.   Patient taking differently: Takes 20 mg daily 5/16/22 6/15/22  Kurtis Tomlinson DO   azelastine (ASTELIN) 0.1 % nasal spray 1 spray by Nasal route 2 times daily Use in each nostril as directed 4/28/22   Eris Buchanan DO   fluticasone (FLONASE) 50 MCG/ACT nasal spray 2 sprays by Each Nostril route daily  Patient not taking: Reported on 6/20/2022 4/8/22   Eris Buchanan DO   omeprazole (PRILOSEC) 20 MG delayed release capsule Take 20 mg by mouth daily    Historical Provider, MD   Evolocumab (REPATHA) 140 MG/ML SOSY INJECT 1ML INTO THE SKIN EVERY 14 DAYS  Patient not taking: Reported on 6/20/2022 1/27/22   Eris Buchanan DO   diclofenac (VOLTAREN) 50 MG EC tablet TAKE ONE TABLET BY MOUTH THREE TIMES A DAY WITH MEALS 11/11/21   Eris Buchanan DO   aspirin 81 MG tablet Take 81 mg by mouth daily Wayne/Dr KIKI Lauren MD   Cholecalciferol (VITAMIN D3) 2000 UNITS CAPS Take 5,000 Units by mouth daily     Historical Provider, MD   Multiple Vitamins-Minerals (MULTIVITAMIN & MINERAL PO) Take 1 tablet by mouth daily    Historical Provider, MD   calcium carbonate 600 MG TABS tablet Take 1 tablet by mouth daily  Patient not taking: Reported on 6/20/2022    Historical Provider, MD   Omega-3 Fatty Acids (FISH OIL) 1000 MG CAPS Take 2,000 mg by mouth 2 times daily   Patient not taking: Reported on 6/20/2022    Historical Provider, MD Allergies  Allergies   Allergen Reactions    Atorvastatin     Avelox [Moxifloxacin]     Codeine     Daypro [Oxaprozin] Itching    Demerol Hcl [Meperidine]     Levaquin [Levofloxacin In D5w]     Tape [Adhesive Tape] Itching     Ok to use paper tape per pt    Valium [Diazepam]            Objective  BP (!) 93/51   Pulse 94   Temp 98.2 °F (36.8 °C) (Oral)   Resp 16   Ht 5' 4\" (1.626 m)   Wt 167 lb 1 oz (75.8 kg)   SpO2 92%   BMI 28.68 kg/m²     Physical Exam:   Performance Status:  General: AAO to person, place, time, in no acute distress,   Head and neck : PERRLA, EOMI . Sclera non icteric. Oropharynx : Clear  Neck: no JVD,  no adenopathy  Heart: Regular rate and regular rhythm, no murmur  Lungs: Clear to auscultation   Extremities: No edema,no cyanosis, no clubbing. Abdomen: Soft, non-tender;no masses, no organomegaly  Skin:  No rash. Neurologic:Cranial nerves grossly intact. No focal motor or sensory deficits .     Recent Laboratory Data-   Lab Results   Component Value Date    WBC 9.4 06/21/2022    HGB 7.7 (L) 06/21/2022    HCT 25.2 (L) 06/21/2022    MCV 84.8 06/21/2022     (H) 06/21/2022    LYMPHOPCT 24.7 06/21/2022    RBC 2.97 (L) 06/21/2022    MCH 25.9 (L) 06/21/2022    MCHC 30.6 (L) 06/21/2022    RDW 14.6 06/21/2022    NEUTOPHILPCT 61.2 06/21/2022    MONOPCT 11.2 06/21/2022    BASOPCT 0.5 06/21/2022    NEUTROABS 5.71 06/21/2022    LYMPHSABS 2.31 06/21/2022    MONOSABS 1.05 (H) 06/21/2022    EOSABS 0.18 06/21/2022    BASOSABS 0.05 06/21/2022       Lab Results   Component Value Date     06/21/2022    K 4.3 06/21/2022     06/21/2022    CO2 23 06/21/2022    BUN 13 06/21/2022    CREATININE 0.7 06/21/2022    GLUCOSE 106 (H) 06/21/2022    CALCIUM 9.3 06/21/2022    PROT 7.9 06/20/2022    LABALBU 3.6 06/20/2022    BILITOT 0.5 06/20/2022    ALKPHOS 78 06/20/2022    AST 19 06/20/2022    ALT 21 06/20/2022    LABGLOM >60 06/21/2022    GFRAA >60 06/21/2022       No results found for: IRON, TIBC, FERRITIN        Radiology-    XR HIP BILATERAL W AP PELVIS (2 VIEWS)    Result Date: 6/1/2022  EXAMINATION: ONE XRAY VIEW OF THE PELVIS AND TWO XRAY VIEWS OF EACH OF THE BILATERAL HIPS 6/1/2022 12:42 am COMPARISON: None. HISTORY: ORDERING SYSTEM PROVIDED HISTORY: pain TECHNOLOGIST PROVIDED HISTORY: Reason for exam:->pain FINDINGS: There is no evidence of acute fracture or dislocation. No acute osseous, soft tissue or joint abnormality is seen. No radiopaque foreign body identified. Mild degenerative changes of the hip joints are noted bilaterally, as well as mild degenerative changes involving the sacroiliac joints and lower lumbar spine. No acute findings. Mild degenerative changes. CT ABDOMEN PELVIS W IV CONTRAST Additional Contrast? None    Result Date: 6/20/2022  EXAMINATION: CT OF THE ABDOMEN AND PELVIS WITH CONTRAST 6/20/2022 1:05 pm TECHNIQUE: CT of the abdomen and pelvis was performed with the administration of intravenous contrast. Multiplanar reformatted images are provided for review. Automated exposure control, iterative reconstruction, and/or weight based adjustment of the mA/kV was utilized to reduce the radiation dose to as low as reasonably achievable. COMPARISON: 05/31/2022 HISTORY: ORDERING SYSTEM PROVIDED HISTORY: abdominal TECHNOLOGIST PROVIDED HISTORY: Additional Contrast?->None Reason for exam:->abdominal Decision Support Exception - unselect if not a suspected or confirmed emergency medical condition->Emergency Medical Condition (MA) FINDINGS: Lower Chest: Patchy consolidative infiltrate identified the lung bases bilaterally as well as the right middle lobe concerning for multi lobar pneumonia. Organs: The liver is homogeneous in appearance. No stones in the gallbladder. The spleen is unremarkable. Parapelvic cysts in the kidneys bilaterally. Adrenal glands are unremarkable. Symmetric enhancement of the renal parenchyma. Pancreas is homogeneous. GI/Bowel:  The stomach is unremarkable. The small bowel is within normal limits. No mucosal abnormality. Stool seen scattered diffusely throughout the colon. No evidence of obvious obstruction or obstructing lesion. Pelvis: Pelvic organs reveal no wall thickening identified of the bladder. The uterus is been surgically removed. Diverticulosis with no evidence of diverticulitis. Peritoneum/Retroperitoneum: There is no abdominal retroperitoneal lymphadenopathy with mild to moderate atherosclerotic disease within the abdominal aorta and iliac vessels. There is no free fluid or free air. No abnormal mass or fluid collections identified. Bones/Soft Tissues: The structures reveal right pubic symphysis with a lytic lesion identified. Underlying malignancy is of concern. Degenerative changes seen within the spine. Airspace disease identified lower lung fields bilaterally. There is stool seen scattered throughout the colon to suggest clinical presentation of constipation. Stable abnormal appearance of the right pubic symphysis with lytic lesion identified concerning for malignancy or metastatic disease. CT ABDOMEN PELVIS W IV CONTRAST Additional Contrast? None    Result Date: 5/31/2022  EXAMINATION: CT OF THE ABDOMEN AND PELVIS WITH CONTRAST 5/31/2022 9:55 pm TECHNIQUE: CT of the abdomen and pelvis was performed with the administration of intravenous contrast. Multiplanar reformatted images are provided for review. Automated exposure control, iterative reconstruction, and/or weight based adjustment of the mA/kV was utilized to reduce the radiation dose to as low as reasonably achievable. COMPARISON: None.  HISTORY: ORDERING SYSTEM PROVIDED HISTORY: abd pain TECHNOLOGIST PROVIDED HISTORY: Reason for exam:->abd pain Additional Contrast?->None Decision Support Exception - unselect if not a suspected or confirmed emergency medical condition->Emergency Medical Condition (MA) FINDINGS: Lower Chest: 3.3 cm spiculated mass right middle

## 2022-06-21 NOTE — CONSULTS
Palliative Care Department  [phone]  Palliative Care Initial Consult  Provider Maxx Jolly, APRN - JACOB     Luiz Goodrich  [id number]  Hospital Day: 2  Date of Initial Consult: 6/21/2022  Referring Provider: Ted Dolan MD  Palliative Medicine was consulted for assistance with: Symptom Management     HPI:   Luiz Goodrich is a 66 y.o. with a medical history of HLD, GERD, hypothyroidism, depression, newly lung CA who was admitted on 6/20/2022 from home with a CHIEF COMPLAINT of right leg pain and abdominal tenderness. On 5/31, patient presented to ED with abdominal tenderness and right leg pain. Imaging was significant for large masses in the bilateral lung fields and a 2 cm lytic lesion on the right side of the symphysis pubis. Patient was seen in oncology office on 6/10. Patient presented back to ED on 6/20 with right leg pain and abdominal tenderness. Patient was admitted for further management and taken for CT biopsy of the lung on 6/21. Palliative medicine is consulted for symptom management. ASSESSMENT/PLAN:     Pertinent Hospital Diagnoses      Concern for new lung CA   Lytic lesion of the symphysis pubis      Palliative Care Encounter / Counseling Regarding Goals of Care  Please see detailed goals of care discussion as below   At this time, Luiz Goodrich, Does have capacity for medical decision-making.   Capacity is time limited and situation/question specific   Outcome of goals of care meeting: improve pain and quality of life, symptom management regimen adjustments as below   Code status Full Code   Advanced Directives: no POA or living will in [facility]   Surrogate/Legal NOK:  devaughn Herman Turner, spouse, [phone]  o Quyen Littlejohn, child, [phone]      Neoplasm related pain:   -Oxycodone-acetaminophen 5-325mg 1 tablet q4h PRN: 2 doses in last 24h-discontinue   -Oxy IR 10mg or 15mr q4h PRN   -Morphine 2 mg IV q4h PRN: 1 dose in last 24 hours    Bowel regimen:   -  Senokot-S 1 tablet BID   -  Glycolax packet daily PRN    Spiritual assessment: no spiritual distress identified  Bereavement and grief: to be determined  Referrals to: none today  SUBJECTIVE:     Current medical issues leading to Palliative Medicine involvement include   Active Hospital Problems    Diagnosis Date Noted    Intractable pain [R52] 06/20/2022     Priority: Medium    Right hip pain [M25.551] 06/20/2022     Priority: Medium    Lung mass [R91.8] 06/20/2022     Priority: Medium    Thyroid disease [E07.9] 06/20/2022     Priority: Medium    Gastroesophageal reflux disease without esophagitis [K21.9]      Priority: Medium    Anxiety [F41.9]      Priority: Medium    Hiatal hernia [K44.9]      Priority: Medium    Hyperlipidemia LDL goal <100 [E78.5] 02/20/2018       Details of Conversation:  Chart reviewed and patient seen. Patient is awake in bed, spouse and daughter at bedside. Role of palliative medicine explained. Discussion regarding patient's symptoms. Patient states that her pain is so severe that she is unable to move. She goes on to explain that she cannot walk to the bathroom because the pain is so intense. States pain is 10/10 and at best after medication, both percocet and morphine, pain is 7/10. Patient states pain is in her back and radiates to her chest. Patient also states pain is in her R hip. Patient describes pain as cramping, aching, and constant. We discussed starting oxy IR and utilizing morphine for breakthrough pain. Patient is in agreement. Patient also complains of constipation. Last bowel movement was Sunday. States she was taking senokot at home-1 tablet BID- and was having regular bowel movements. Will add Senokot-S to bowel regimen. Patient denies any other symptoms at this time. Support provided to patient and family. Will follow.         OBJECTIVE:   Prognosis: depends upon goals and unknown    Physical Exam:  BP (!) 93/51   Pulse 94   Temp 98.2 °F (36.8 °C) (Oral)   Resp 16   Ht 5' 4\" (1.626 m)   Wt 167 lb 1 oz (75.8 kg)   SpO2 92%   BMI 28.68 kg/m²   Constitutional:  Elderly, NAD, awake, alert  Eyes: no scleral icterus, normal lids, no discharge  ENMT:  Normocephalic, atraumatic, mucosa moist, EOMI  Neck:  trachea midline, no JVD  Lungs: RA, diminished bilaterally, no audible rhonchi or wheezes noted, respirations unlabored, no retractions  Heart:  RRR, distant heart tones, no murmur, rub, or gallop noted during exam  Abd:  Soft, non tender, non distended, bowel sounds hypoactive  Ext:  Moving all extremities, no edema, pulses present  Skin:  Warm and dry  Neuro: Alert, grossly nonfocal; following commands    Objective data reviewed: labs, images, records, medication use, vitals and chart    Discussed patient and the plan of care with the other IDT members: Palliative Medicine IDT Team    Time/Communication  Greater than 50% of time spent, total 70 minutes in counseling and coordination of care at the bedside regarding symptom management. Thank you for allowing Palliative Medicine to participate in the care of Micheal Wakefield.

## 2022-06-21 NOTE — CARE COORDINATION
Social Work/Discharge Planning:  Met with patient and her  Abel Mae and completed initial assessment. Explained Social Work role and discussed transition of care/discharge planning. Patient lives with her  in a two story house. She states has Lung Cancer and had a lung biopsy today. Patient indicated she has been having right hip pain, which has caused difficulty in walking. She has a shower chair and a walker at home. She denies any history with hhc or snf. Patient PCP is Dr. Osito Pino and pharmacy is [facility]. She plans to return home at discharge and denies any need for home health care at this time. Will continue to follow and assist with discharge planning.  Electronically signed by CHAR Le on 6/21/2022 at 11:51 AM

## 2022-06-21 NOTE — OR NURSING
Patient brought into CT room and procedure explained by Dr. Sundeep Stubbs. Procedural consent obtained. Patient placed supine on CT table with O2 and patient placed on monitor. Patient medicated with Fentanyl for patient comfort. Using CT, needle inserted and biopsy x 4 obtained from left lateral chest to obtain left lung by Dr. Sundeep Stubbs. Patient re-scanned and imaging reviewed by Dr. Sundeep Stubbs. Biopsy site cleaned and dressing applied. Post orders placed for bedrest, vital signs and chest x ray. Patient taken back to room and reported called to floor RN .

## 2022-06-21 NOTE — PLAN OF CARE
Problem: Safety - Adult  Goal: Free from fall injury  6/21/2022 0526 by Andre Marshall RN  Outcome: Progressing     Problem: Discharge Planning  Goal: Discharge to home or other facility with appropriate resources  Outcome: Progressing     Problem: Pain  Goal: Verbalizes/displays adequate comfort level or baseline comfort level  6/21/2022 0526 by Andre Marshall RN  Outcome: Progressing

## 2022-06-21 NOTE — PROGRESS NOTES
Physical Therapy  Facility/Department: [address] MED SURG/TELE     Name: Parviz Tellez  : 1950  MRN: [id number]  Date of Service: 2022    PT eval was attempted this am and pt is on bedrest as of 10:30 am 22. Will re-attempt PT eval another time when pt is cleared for OOB activity. Andres Stevens., P.T.   License Number: [id number]

## 2022-06-21 NOTE — PROGRESS NOTES
[facility] Hospitalist   Progress Note    Admitting Date and Time: 6/20/2022 10:38 AM  Admit Dx: Anemia due to gastrointestinal blood loss [D50.0]  Intractable pain [R52]    Subjective:    Pt states she is having significant pain. Returning from lung biopsy. Complaints of pain to right hip RUQ that shoots through to her back. Also under left breast.  Requesting R/S. States she was up to the bathroom but feels that she is having difficulty having a bowel movement. Palliative medicine in to see patient. To change analgesics. Will check with nursing for availability. Patient  and daughter at bedside. Per RN: Returning from lung biopsy in IR. ROS: denies fever, chills, cp, sob, n/v, HA unless stated above.      calcium elemental  1 tablet Oral Daily    vitamin D  2,000 Units Oral BID    docusate sodium  100 mg Oral BID    levothyroxine  25 mcg Oral Daily    pantoprazole  40 mg Oral QAM AC    sodium chloride flush  5-40 mL IntraVENous 2 times per day    enoxaparin  40 mg SubCUTAneous Daily    benzonatate  100 mg Oral Daily    citalopram  20 mg Oral Daily     acetaminophen, 650 mg, Q6H PRN   Or  acetaminophen, 650 mg, Q6H PRN  iopamidol, 50 mL, ONCE PRN  sodium chloride flush, 5-40 mL, PRN  sodium chloride, , PRN  ondansetron, 4 mg, Q8H PRN   Or  ondansetron, 4 mg, Q6H PRN  polyethylene glycol, 17 g, Daily PRN  oxyCODONE-acetaminophen, 1 tablet, Q4H PRN  morphine, 2 mg, Q4H PRN  naloxone, 0.4 mg, PRN         Objective:    BP (!) 93/51   Pulse 94   Temp 98.2 °F (36.8 °C) (Oral)   Resp 16   Ht 5' 4\" (1.626 m)   Wt 167 lb 1 oz (75.8 kg)   SpO2 92%   BMI 28.68 kg/m²   General Appearance: alert and oriented to person, place and time and in no acute distress  Skin: warm and dry  Head: normocephalic and atraumatic  Eyes: pupils equal, round, and reactive to light, extraocular eye movements intact, conjunctivae normal  Neck: neck supple and non tender without mass Pulmonary/Chest: clear to auscultation bilaterally- no wheezes, rales or rhonchi, normal air movement, no respiratory distress  Cardiovascular: normal rate, normal S1 and S2 and no RGM  Abdomen: soft, non-tender, non-distended, normal bowel sounds,  Extremities: no cyanosis, no clubbing and no edema. Neurologic: no cranial nerve deficit and speech normal      Recent Labs     06/20/22  1115 06/21/22  0245    134   K 3.9 4.3   CL 99 100   CO2 22 23   BUN 14 13   CREATININE 0.6 0.7   GLUCOSE 106* 106*   CALCIUM 9.5 9.3       Recent Labs     06/20/22  1115   ALKPHOS 78   PROT 7.9   LABALBU 3.6   BILITOT 0.5   AST 19   ALT 21       Recent Labs     06/20/22  1115 06/21/22  0245   WBC 8.6 9.4   RBC 3.25* 2.97*   HGB 8.4* 7.7*   HCT 27.7* 25.2*   MCV 85.2 84.8   MCH 25.8* 25.9*   MCHC 30.3* 30.6*   RDW 14.7 14.6   * 487*   MPV 9.5 9.4           Radiology:   CT ABDOMEN PELVIS W IV CONTRAST Additional Contrast? None   Final Result   Airspace disease identified lower lung fields bilaterally. There is stool   seen scattered throughout the colon to suggest clinical presentation of   constipation. Stable abnormal appearance of the right pubic symphysis with lytic lesion   identified concerning for malignancy or metastatic disease. CT NEEDLE BIOPSY LUNG/MEDIASTINUM PERCUTANEOUS    (Results Pending)   CT GUIDED NEEDLE PLACEMENT    (Results Pending)   CT CHEST WO CONTRAST    (Results Pending)       Assessment:  Active Problems:    Intractable pain    Right hip pain    Lung mass    Thyroid disease    Gastroesophageal reflux disease without esophagitis    Anxiety    Hiatal hernia    Hyperlipidemia LDL goal <100  Resolved Problems:    * No resolved hospital problems. *      Plan:  1. Lung mass with metastasis- CT chest no mediastinal lymphadenopathy multiple large masses in the bilateral lung fields. Central cavitation largest in the left lower lobe measuring up to 7.5 cm.   CT A/P 2 cm lytic lesion right side of the symphysis pubis.  230. CEA/CA normal.  CT-guided biopsy of lung ordered. IR consulted. Analgesics. Palliative medicine/hematology/oncology input appreciated. 2. Intractable bone pain- CT A/P right pubic symphysis with lytic lesion concerning for malignancy/metastatic disease. PT/OT. Palliative medicine/hematology/oncology input appreciated. 3. HLD- resume omega-3 fatty acids following biopsy. Lipid panel at goal.  4. Hypothyroidism-  5. GERD- Continue pantoprazole. 6. Vitamin D deficiency- Continue supplementation. NOTE: This report was transcribed using voice recognition software. Every effort was made to ensure accuracy; however, inadvertent computerized transcription errors may be present. Electronically signed by Allen Patterson CNP on 6/21/2022 at 9:00 AM

## 2022-06-22 PROCEDURE — 99231 SBSQ HOSP IP/OBS SF/LOW 25: CPT | Performed by: NURSE PRACTITIONER

## 2022-06-22 PROCEDURE — 97535 SELF CARE MNGMENT TRAINING: CPT

## 2022-06-22 PROCEDURE — 97165 OT EVAL LOW COMPLEX 30 MIN: CPT

## 2022-06-22 PROCEDURE — 2580000003 HC RX 258: Performed by: NURSE PRACTITIONER

## 2022-06-22 PROCEDURE — 97530 THERAPEUTIC ACTIVITIES: CPT

## 2022-06-22 PROCEDURE — 6360000002 HC RX W HCPCS: Performed by: NURSE PRACTITIONER

## 2022-06-22 PROCEDURE — 99233 SBSQ HOSP IP/OBS HIGH 50: CPT | Performed by: INTERNAL MEDICINE

## 2022-06-22 PROCEDURE — 6370000000 HC RX 637 (ALT 250 FOR IP): Performed by: FAMILY MEDICINE

## 2022-06-22 PROCEDURE — APPSS30 APP SPLIT SHARED TIME 16-30 MINUTES: Performed by: NURSE PRACTITIONER

## 2022-06-22 PROCEDURE — 97161 PT EVAL LOW COMPLEX 20 MIN: CPT

## 2022-06-22 PROCEDURE — 6370000000 HC RX 637 (ALT 250 FOR IP): Performed by: NURSE PRACTITIONER

## 2022-06-22 PROCEDURE — 1200000000 HC SEMI PRIVATE

## 2022-06-22 RX ORDER — SENNA AND DOCUSATE SODIUM 50; 8.6 MG/1; MG/1
2 TABLET, FILM COATED ORAL 2 TIMES DAILY
Status: DISCONTINUED | OUTPATIENT
Start: 2022-06-22 | End: 2022-06-24 | Stop reason: HOSPADM

## 2022-06-22 RX ADMIN — ONDANSETRON 4 MG: 4 TABLET, ORALLY DISINTEGRATING ORAL at 09:57

## 2022-06-22 RX ADMIN — ONDANSETRON 4 MG: 2 INJECTION INTRAMUSCULAR; INTRAVENOUS at 16:24

## 2022-06-22 RX ADMIN — BISACODYL 10 MG: 10 SUPPOSITORY RECTAL at 11:48

## 2022-06-22 RX ADMIN — OXYCODONE 15 MG: 15 TABLET ORAL at 05:38

## 2022-06-22 RX ADMIN — Medication 10 ML: at 23:19

## 2022-06-22 RX ADMIN — CALCIUM 500 MG: 500 TABLET ORAL at 08:36

## 2022-06-22 RX ADMIN — BENZONATATE 100 MG: 100 CAPSULE ORAL at 08:36

## 2022-06-22 RX ADMIN — MORPHINE SULFATE 2 MG: 2 INJECTION, SOLUTION INTRAMUSCULAR; INTRAVENOUS at 20:34

## 2022-06-22 RX ADMIN — LEVOTHYROXINE SODIUM 25 MCG: 25 TABLET ORAL at 05:32

## 2022-06-22 RX ADMIN — PANTOPRAZOLE SODIUM 40 MG: 40 TABLET, DELAYED RELEASE ORAL at 05:32

## 2022-06-22 RX ADMIN — ENOXAPARIN SODIUM 40 MG: 100 INJECTION SUBCUTANEOUS at 18:35

## 2022-06-22 RX ADMIN — DOCUSATE SODIUM 50 MG AND SENNOSIDES 8.6 MG 2 TABLET: 8.6; 5 TABLET, FILM COATED ORAL at 20:33

## 2022-06-22 RX ADMIN — Medication 10 ML: at 08:37

## 2022-06-22 RX ADMIN — CITALOPRAM HYDROBROMIDE 20 MG: 20 TABLET ORAL at 08:36

## 2022-06-22 RX ADMIN — OXYCODONE 15 MG: 15 TABLET ORAL at 00:16

## 2022-06-22 RX ADMIN — OXYCODONE 15 MG: 15 TABLET ORAL at 23:26

## 2022-06-22 RX ADMIN — MORPHINE SULFATE 2 MG: 2 INJECTION, SOLUTION INTRAMUSCULAR; INTRAVENOUS at 16:24

## 2022-06-22 RX ADMIN — DOCUSATE SODIUM 50 MG AND SENNOSIDES 8.6 MG 1 TABLET: 8.6; 5 TABLET, FILM COATED ORAL at 08:35

## 2022-06-22 RX ADMIN — OXYCODONE 15 MG: 15 TABLET ORAL at 18:35

## 2022-06-22 RX ADMIN — ACETAMINOPHEN 650 MG: 325 TABLET ORAL at 08:35

## 2022-06-22 RX ADMIN — OXYCODONE 15 MG: 15 TABLET ORAL at 09:56

## 2022-06-22 RX ADMIN — OXYCODONE 15 MG: 15 TABLET ORAL at 14:11

## 2022-06-22 ASSESSMENT — PAIN SCALES - GENERAL
PAINLEVEL_OUTOF10: 5
PAINLEVEL_OUTOF10: 8
PAINLEVEL_OUTOF10: 9
PAINLEVEL_OUTOF10: 4
PAINLEVEL_OUTOF10: 7
PAINLEVEL_OUTOF10: 6
PAINLEVEL_OUTOF10: 7
PAINLEVEL_OUTOF10: 9
PAINLEVEL_OUTOF10: 6
PAINLEVEL_OUTOF10: 7
PAINLEVEL_OUTOF10: 0
PAINLEVEL_OUTOF10: 2
PAINLEVEL_OUTOF10: 10
PAINLEVEL_OUTOF10: 0
PAINLEVEL_OUTOF10: 7

## 2022-06-22 ASSESSMENT — PAIN DESCRIPTION - LOCATION
LOCATION: ABDOMEN;INCISION
LOCATION: ABDOMEN
LOCATION: ABDOMEN
LOCATION: RIB CAGE
LOCATION: CHEST
LOCATION: CHEST
LOCATION: GENERALIZED
LOCATION: RIB CAGE;BACK

## 2022-06-22 ASSESSMENT — PAIN DESCRIPTION - DESCRIPTORS
DESCRIPTORS: ACHING;JABBING;PRESSURE
DESCRIPTORS: ACHING;DISCOMFORT
DESCRIPTORS: CRUSHING;ACHING
DESCRIPTORS: CRUSHING;ACHING
DESCRIPTORS: ACHING;SORE
DESCRIPTORS: ACHING;DISCOMFORT

## 2022-06-22 ASSESSMENT — PAIN DESCRIPTION - ORIENTATION
ORIENTATION: RIGHT;LEFT
ORIENTATION: RIGHT;LEFT
ORIENTATION: DISTAL
ORIENTATION: POSTERIOR
ORIENTATION: RIGHT;LEFT
ORIENTATION: POSTERIOR
ORIENTATION: POSTERIOR

## 2022-06-22 ASSESSMENT — PAIN - FUNCTIONAL ASSESSMENT
PAIN_FUNCTIONAL_ASSESSMENT: PREVENTS OR INTERFERES SOME ACTIVE ACTIVITIES AND ADLS
PAIN_FUNCTIONAL_ASSESSMENT: PREVENTS OR INTERFERES SOME ACTIVE ACTIVITIES AND ADLS
PAIN_FUNCTIONAL_ASSESSMENT: PREVENTS OR INTERFERES WITH MANY ACTIVE NOT PASSIVE ACTIVITIES
PAIN_FUNCTIONAL_ASSESSMENT: PREVENTS OR INTERFERES SOME ACTIVE ACTIVITIES AND ADLS
PAIN_FUNCTIONAL_ASSESSMENT: PREVENTS OR INTERFERES WITH MANY ACTIVE NOT PASSIVE ACTIVITIES

## 2022-06-22 ASSESSMENT — PAIN SCALES - WONG BAKER: WONGBAKER_NUMERICALRESPONSE: 0

## 2022-06-22 ASSESSMENT — PAIN DESCRIPTION - PAIN TYPE: TYPE: ACUTE PAIN

## 2022-06-22 ASSESSMENT — PAIN DESCRIPTION - ONSET: ONSET: ON-GOING

## 2022-06-22 ASSESSMENT — PAIN DESCRIPTION - FREQUENCY: FREQUENCY: CONTINUOUS

## 2022-06-22 NOTE — PROGRESS NOTES
modifications for increased safety with functional transfers/mobility and ADLs  * Therapeutic exercise to improve motor endurance, ROM, and functional strength for ADLs/functional transfers  * Therapeutic activities to facilitate/challenge dynamic balance, stand tolerance for increased safety and independence with ADLs  * Neuro-muscular re-education: facilitation of righting/equilibrium reactions, midline orientation, scapular stability/mobility, normalization of muscle tone, and facilitation of volitional active controled movement    Recommended Adaptive Equipment: TBD     Home Living: Patient lives with her  in a two-floor home; patient's bedroom and bathroom are on the main living level. Bathroom Setup: walk-in shower (with seat, grab bars, and handheld shower head) and standard-height toilet  Equipment Owned: N/A   Patient's family members noted that they can borrow a BSC from family members, if needed. Prior Level of Function (PLOF): Patient was independent with ADLs; patient's  and patient share responsibility for completion of most IADLs. Patient's  and other family members can assist with IADLs, as needed. Patient was independent with functional mobility (without device) prior to this hospitalization. Driving: Yes - family members can assist with transportation    Pain Level: Patient reported experiencing pain in her chest, but did not rate her pain; nursing had provided patient with pain medication recently. Cognition: Patient alert and oriented x3. WFL command follow demonstrated. Patient pleasant, cooperative, and motivated to return to [facility] and home environment.   Memory: WFL  Sequencing: WFL  Problem Solving: WFL  Judgement/Safety: WFL grossly    Functional Assessment:  AM-PAC Daily Activity Raw Score: 19/24   Initial Eval Status  Date: 6/22/2022 Treatment Status  Date:  Short Term Goals = Long Term Goals   Feeding Independent  N/A   Grooming SBA for hand hygiene while standing at sink. Independent / Mod I  (seated/standing at sink)   UB Dressing SBA  Mod I / Independent   LB Dressing Min A  Family members can assist with LB dressing tasks, as needed. Patient education provided regarding compensatory strategies to maximize independence/safety with LB dressing tasks. Mod I / Independent - with use of AE, as needed/appropriate   Bathing Min A  Mod I / Independent - with use of AE/DME, as needed/appropriate   Toileting Min A  Mod I / Independent   Bed Mobility  Not assessed. Independent in order to maximize patient's independence with ADLs, re-positioning, and other functional tasks. Functional Transfers Sit-to-Stand: CGA - Min A   From toilet and EOB  Independent   Functional Mobility Min A   (with handheld assistance) within patient's bathroom and room. Patient declined use of walker during this session. Mod I / Independent with functional mobility (with device, as needed/appropriate) in order to maximize independence with ADLs/IADLs and other functional tasks. Balance Sitting: Good  (at EOB)  Standing: Fair-  (with handheld assistance)  Good dynamic standing balance during completion of ADLs/IADLs and other functional tasks. Activity Tolerance Fair-  Limited secondary to pain. Patient will demonstrate Good understanding and consistent implementation of energy conservation techniques and work simplification techniques into ADL/IADL routines. Visual/  Perceptual WFL     N/A     Additional Long-Term Goal: Patient will increase functional independence to PLOF in order to allow patient to live in least restrictive environment. Strength: ROM: Additional Information:    R UE  WFL grossly  (per observation) [facility] Patient reported h/o rotator cuff repair. L UE WFL grossly  (per observation) [facility] Patient reported h/o rotator cuff repair. Hearing: [facility]  Sensation: Patient denied experiencing numbness/tingling in B UEs.   Tone: WFL  Edema: No    Comments: RN approved patient's participation in [facility] activities. OT evaluation initially attempted at 1500 this afternoon, however, patient declined to participate in OOB activities secondary to pain; patient agreeable to re-attempt of OT evaluation later this PM. Upon arrival for re-attempt, patient was seated on the toilet in the bathroom with family member present. At end of session, patient seated in bedside chair with call light and phone within reach, patient's RN present, several family members present, tray table within reach, and all lines and tubes intact. Patient would benefit from continued skilled OT to increase safety and independence with completion of ADL/IADL tasks for functional independence and quality of life. Treatment: OT treatment provided this date included:    Instruction/training on safety and adapted techniques for completion of ADLs.   Instruction/training on safe functional mobility/transfer techniques.   Instruction/training on energy conservation/work simplification for completion of ADLs - including potential benefits of DME to maximize independence/safety with ADLs in home environment. Patient education provided regardin) importance of having staff assistance with ADLs and other OOB activities to prevent falls/injury during hospitalization. Patient indicated understanding. Further skilled OT treatment indicated to increase patient's safety and independence with completion of ADL/IADL tasks in order to maximize patient's functional independence and quality of life. Rehab Potential: Good for established goals. Patient / Family Goal: Patient anticipates returning home upon discharge. Patient noted that family members will be able to provide consistent assistance with ADLs and IADLs, as needed. Patient and/or family were instructed on functional diagnosis, prognosis/goals, and OT plan of care. Demonstrated Good understanding.     Eval Complexity: Low    Time In: 5749  Time Out: 1625  Total Treatment Time: 10 minutes      Minutes Units   OT Eval Low 97895 15 1   OT Eval Medium 60561     OT Eval High 12314     OT Re-Eval [id number]     Therapeutic Ex 02701     Therapeutic Activities 68183     ADL/Self Care 29766 10 1   Orthotic Management 23517     Neuro Re-Ed 26595     Non-Billable Time N/A ---     Evaluation time includes thorough review of current medical information, gathering information on past medical history/social history and prior level of function, completion of standardized testing/informal observation of tasks, assessment of data, and education on plan of care and goals. Casandra Benjamin OTR/L  License Number: [id number]

## 2022-06-22 NOTE — PROGRESS NOTES
[facility] Hospitalist   Progress Note    Admitting Date and Time: 6/20/2022 10:38 AM  Admit Dx: Anemia due to gastrointestinal blood loss [D50.0]  Intractable pain [R52]    Subjective:    Pt lying in bed in no acute distress. Feels much better today. States pain medicine has helped.  at bedside. Await specialty plan. Per RN: No new concerns noted at this time. ROS: denies fever, chills, cp, sob, n/v, HA unless stated above.      sennosides-docusate sodium  1 tablet Oral BID    calcium elemental  1 tablet Oral Daily    levothyroxine  25 mcg Oral Daily    pantoprazole  40 mg Oral QAM AC    sodium chloride flush  5-40 mL IntraVENous 2 times per day    enoxaparin  40 mg SubCUTAneous Daily    benzonatate  100 mg Oral Daily    citalopram  20 mg Oral Daily     acetaminophen, 650 mg, Q6H PRN   Or  acetaminophen, 650 mg, Q6H PRN  oxyCODONE, 10 mg, Q4H PRN   Or  oxyCODONE, 15 mg, Q4H PRN  bisacodyl, 10 mg, Daily PRN  iopamidol, 50 mL, ONCE PRN  sodium chloride flush, 5-40 mL, PRN  sodium chloride, , PRN  ondansetron, 4 mg, Q8H PRN   Or  ondansetron, 4 mg, Q6H PRN  polyethylene glycol, 17 g, Daily PRN  morphine, 2 mg, Q4H PRN  naloxone, 0.4 mg, PRN         Objective:    BP (!) 105/54   Pulse 77   Temp 99.1 °F (37.3 °C) (Oral)   Resp 16   Ht 5' 4\" (1.626 m)   Wt 167 lb (75.8 kg)   SpO2 93%   BMI 28.67 kg/m²   General Appearance: alert and oriented to person, place and time and in no acute distress  Skin: warm and dry  Head: normocephalic and atraumatic  Eyes: pupils equal, round, and reactive to light, extraocular eye movements intact, conjunctivae normal  Neck: neck supple and non tender without mass   Pulmonary/Chest: clear to auscultation bilaterally- no wheezes, rales or rhonchi, normal air movement, no respiratory distress  Cardiovascular: normal rate, normal S1 and S2 and no RGM  Abdomen: soft, non-tender, non-distended, normal bowel sounds,  Extremities: no cyanosis, no clubbing and no edema. Neurologic: no cranial nerve deficit and speech normal      Recent Labs     06/20/22  1115 06/21/22  0245    134   K 3.9 4.3   CL 99 100   CO2 22 23   BUN 14 13   CREATININE 0.6 0.7   GLUCOSE 106* 106*   CALCIUM 9.5 9.3       Recent Labs     06/20/22  1115   ALKPHOS 78   PROT 7.9   LABALBU 3.6   BILITOT 0.5   AST 19   ALT 21       Recent Labs     06/20/22  1115 06/21/22  0245 06/21/22  1539   WBC 8.6 9.4  --    RBC 3.25* 2.97*  --    HGB 8.4* 7.7*  --    HCT 27.7* 25.2* 26.2*   MCV 85.2 84.8  --    MCH 25.8* 25.9*  --    MCHC 30.3* 30.6*  --    RDW 14.7 14.6  --    * 487*  --    MPV 9.5 9.4  --            Radiology:   XR CHEST 1 VIEW   Final Result   1. Successful CT-guided biopsy of the mass within the left lower lobe   2. There is no left pneumothorax         CT NEEDLE BIOPSY LUNG/MEDIASTINUM PERCUTANEOUS   Final Result   Successful 20-gauge core biopsy of the 5 cm x 7 cm solid mass seen within the   left lower lobe. Administration of conscious sedation. CT CHEST WO CONTRAST   Final Result   1. Multiple bilateral pulmonary masses, unchanged when compared with the   patient's previous CT scan of the thorax of 6/1/2022.   2. Postobstructive pneumonia within the right upper lobe, right middle lobe   and right lower lobe. 3. The patient is scheduled for CT-guided biopsy. RECOMMENDATIONS:   Unavailable         CT GUIDED NEEDLE PLACEMENT   Final Result   Successful 20-gauge core biopsy of the 5 cm x 7 cm solid mass seen within the   left lower lobe. Administration of conscious sedation. CT ABDOMEN PELVIS W IV CONTRAST Additional Contrast? None   Final Result   Airspace disease identified lower lung fields bilaterally. There is stool   seen scattered throughout the colon to suggest clinical presentation of   constipation.       Stable abnormal appearance of the right pubic symphysis with lytic lesion   identified concerning for malignancy or metastatic disease. Assessment:  Active Problems:    Intractable pain    Right hip pain    Lung mass    Thyroid disease    Gastroesophageal reflux disease without esophagitis    Anxiety    Hiatal hernia    Palliative care encounter    Goals of care, counseling/discussion    DNR (do not resuscitate) discussion    Hyperlipidemia LDL goal <100  Resolved Problems:    * No resolved hospital problems. *      Plan:  1. Lung mass with metastasis- CT chest no mediastinal lymphadenopathy multiple large masses in the bilateral lung fields. Central cavitation largest in the left lower lobe measuring up to 7.5 cm. CT A/P 2 cm lytic lesion right side of the symphysis pubis.  230. CEA/CA normal.  CT-guided biopsy of lung/21/22. Await results. .  Analgesics. PET scan as outpatient. Palliative medicine/hematology/oncology input appreciated. 2. Intractable bone pain- CT A/P right pubic symphysis with lytic lesion concerning for malignancy/metastatic disease. PT/OT. Palliative medicine/hematology/oncology input appreciated. 3. HLD- resume omega-3 fatty acids following biopsy. Lipid panel at goal.  4. Hypothyroidism-  5. GERD- Continue pantoprazole. 6. Vitamin D deficiency- Continue supplementation. NOTE: This report was transcribed using voice recognition software. Every effort was made to ensure accuracy; however, inadvertent computerized transcription errors may be present. Electronically signed by Eliud Patterson CNP on 6/22/2022 at 7:54 AM

## 2022-06-22 NOTE — PROGRESS NOTES
Physical Therapy  Facility/Department: [facility] SURG/TELE  Physical Therapy Initial Assessment    Name: Abhishek Gibbs  : 1950  MRN: [id number]  Date of Service: 2022      Attending Provider:  Ana Murphy DO    Evaluating PT:  Jenn Cox, P.T. Room #:  9307/7996-N  Diagnosis:  Anemia due to gastrointestinal blood loss [D50.0]  Intractable pain [R52]  Pertinent PMHx/PSHx:  22 pt was found to have B large lung masses and a lytic lesion R side of pubic symphysis  Procedure/Surgery:  CT guided needle biopsy x4  Precautions:  falls    SUBJECTIVE:    Pt lives with her  in a 2 story home with 3 stairs and 2 rails to enter. Her bed and bath are on the first floor. Pt ambulated with no AD, but has a ww if needed. OBJECTIVE:   Initial Evaluation  Date: 22 Treatment Short Term/ Long Term   Goals   Was pt agreeable to Eval/treatment? yes     Does pt have pain? 5/10 R hip area pain and L side of chest pain where biopsies were taken and this pain increased with activity. Bed Mobility  Rolling: SBA  Supine to sit: MIN A  Sit to supine: NA  Scooting: SBA  Independent    Transfers Sit to stand: SBA  Stand to sit: SBA  Stand pivot: SBA with ww  Independent    Ambulation   15 +30 feet with ww MIN A  150 feet with ww Independent    Stair negotiation: ascended and descended NA, amb distance limited due to L side rib area pain  3 steps with 1 rail Independent    AM-PAC 6 Clicks 63/61       BLE ROM is WFL. BLE strength is grossly 4/5.    Sensation:  Pt denies numbness and tingling to extremities  Edema:  None noted  Balance: sitting is supervision and standing with ww is SBA  Endurance: fair    Patient education  Pt educated on gait sequence with ww to decrease pain R hip with amb    Patient response to education:   Pt verbalized understanding Pt demonstrated skill Pt requires further education in this area   yes yes yes     ASSESSMENT:    Conditions Requiring Skilled Therapeutic Intervention:    [x]Decreased strength     []Decreased ROM  [x]Decreased functional mobility  [x]Decreased balance   [x]Decreased endurance   []Decreased posture  []Decreased sensation  []Decreased coordination   []Decreased vision  []Decreased safety awareness   [x]Increased pain     Comments:  Pt was in bed and agreeable to PT. She sat up to EOB and c/o nausea. She asked to use BR. She walked with ww to BR and transferred on/off commode with SBA. She performed self hygiene care and then stood at sink to wash her hands with SBA. Pt walked with ww in the room and then back to her bed due to c/o increased L rib area pain that limited further amb distance at this time. Pt had no LOB with amb using ww, but at times was a little unsteady and SBA was provided for safety. Treatment:  Patient practiced and was instructed in the following treatment:     Bed mobility, transfers, ADLs, and gait with ww to improve functional strength and endurance. Pt was left sitting on EOB per her request with call light left by patient and daughter and  were present. Pt's RN was notified pt was asking for pain and nausea medication and she was going to check on pt. Pt's/ family goals   1. To decrease her pain and go home. Patient and or family understand(s) diagnosis, prognosis, and plan of care. PHYSICAL THERAPY PLAN OF CARE:    PT POC is established based on physician order and patient diagnosis     Referring provider/PT Order:  PT eval and treat  Diagnosis:  Anemia due to gastrointestinal blood loss [D50.0]  Intractable pain [R52]  Specific instructions for next treatment:  Increase amb distance as pt is able.       Current Treatment Recommendations:     [x] Strengthening to improve independence with functional mobility   [] ROM to improve ROM and decrease spasm and pain which will help promote independence with functional mobility   [x] Balance Training to improve static/dynamic balance and to reduce fall risk  [x] Endurance Training to improve activity tolerance during functional mobility   [x] Transfer Training to improve safety and independence with all functional transfers   [x] Gait Training to improve gait mechanics, endurance and assess need for appropriate assistive device  [x] Stair Training in preparation for safe discharge home and/or into the community   [] Positioning to prevent skin breakdown and contractures  [] Safety and Education Training   [x] Patient/Caregiver Education   [] HEP  [] Other     PT long term treatment goals are located in above grid    Frequency of treatments: 2-5x/week x 1-2 weeks. Time in  13:45  Time out  14:10    Total Treatment Time  10 minutes     Evaluation Time includes thorough review of current medical information, gathering information on past medical history/social history and prior level of function, completion of standardized testing/informal observation of tasks, assessment of data and education on plan of care and goals. CPT codes:  [x] Low Complexity PT evaluation 86235  [] Moderate Complexity PT evaluation 60706  [] High Complexity PT evaluation 53111  [] PT Re-evaluation 51132  [] Gait training 61655 ** minutes  [] Manual therapy 74955 ** minutes  [x] Therapeutic activities 42555 10 minutes  [] Therapeutic exercises 74900 ** minutes  [] Neuromuscular reeducation 85994 ** minutes     Mike Caballero., P.T.   License Number: [id number]

## 2022-06-22 NOTE — CARE COORDINATION
CASE MANAGEMENT. .... Chart reviewed. Ct bx of the chest pending from yesterday. Palliative Care following. Anticipate dc. Need therapy input for dc planning/needs. PT/OT ordered - requested to see. Will follow.

## 2022-06-22 NOTE — PROGRESS NOTES
[facility] Addendum    I have personally participated in the history, exam, medical decision making with Paxton Barkley NP on the date of service, I have personally reviewed objective data and I agree with past medical, family, and social history as well as assessment and plan unless otherwise noted. Objective  Physical Exam  Vitals: BP (!) 97/55   Pulse 77   Temp 98 °F (36.7 °C) (Oral)   Resp 16   Ht 5' 4\" (1.626 m)   Wt 167 lb (75.8 kg)   SpO2 93%   BMI 28.67 kg/m²   General: well-developed, well-nourished, no acute distress, cooperative  Skin: generally warm, dry, and intact, with normal color  HEENT: normocephalic, atraumatic, no gross abnormalities  Respiratory: clear to auscultation bilaterally without respiratory distress  Cardiovascular: regular rate and rhythm without murmur / rub / gallop  Abdominal: soft, nontender, nondistended, normoactive bowel sounds  Extremities: no obvious edema or deformity  Neurologic: awake, alert, no gross deficits  Psychiatric: normal affect, cooperative    Assessment / Plan  · Lesions of the lungs and pelvis, suspect malignancy  · Anemia of chronic dz  · Hx HPL, GERD, hypothyroid, depression    Pt is a 71F w PMH HPL and arthritis who was admitted 6/20 with abdominal pain. Originally seen in ED 5/31 for abd pain, CT revealed multiple lung masses and follow-up CTA 6/1 showed central cavitation of some of the lesions and pt scheduled for lung biopsy to be done 6/21. Was also referred to pain mgmt and had been sent for CT of back at an outside of facility; this was completed but pt had not met w pain mgmt again yet. Abd pain was unbearable and pt presented here. CT showed lung lesions but also pelvic lesions. Pt was admitted with intractable pain with consults to oncology and palliative care.  elevated but CEA and 15-3 were normal.  S/p lung biopsy 6/21. For outpatient PET scan. Pain more controlled today.   Otherwise as per Ginna's note.  Please see orders for further plan of care.     Electronically signed by Ana Murphy DO on 6/22/2022 at 2:34 PM

## 2022-06-22 NOTE — PROGRESS NOTES
Palliative Care Department  [phone]  Palliative Care Progress Note  Provider Keyur Pate, PORSHA - JACOB     Sowmya Bui  [id number]  Hospital Day: 3  Date of Initial Consult: 6/21/2022  Referring Provider: Kya Mayfield MD  Palliative Medicine was consulted for assistance with: Symptom Management     HPI:   Sowmya Bui is a 66 y.o. with a medical history of HLD, GERD, hypothyroidism, depression, newly lung CA who was admitted on 6/20/2022 from home with a CHIEF COMPLAINT of right leg pain and abdominal tenderness. On 5/31, patient presented to ED with abdominal tenderness and right leg pain. Imaging was significant for large masses in the bilateral lung fields and a 2 cm lytic lesion on the right side of the symphysis pubis. Patient was seen in oncology office on 6/10. Patient presented back to ED on 6/20 with right leg pain and abdominal tenderness. Patient was admitted for further management and taken for CT biopsy of the lung on 6/21. Palliative medicine is consulted for symptom management. ASSESSMENT/PLAN:     Pertinent Hospital Diagnoses      Concern for new lung CA   Lytic lesion of the symphysis pubis      Palliative Care Encounter / Counseling Regarding Goals of Care  Please see detailed goals of care discussion as below   At this time, Sowmya Bui, Does have capacity for medical decision-making.   Capacity is time limited and situation/question specific   Outcome of goals of care meeting: improve pain and quality of life, symptom management regimen adjustments as below   Code status Full Code   Advanced Directives: no POA or living will in [facility]   Surrogate/Legal NOK:  o Hawa Hodgkin, spouse, [phone]  o Chuy Timmons, child, [phone]      Neoplasm related pain:   -Oxy IR 10mg or 15mg q4h PRN: 5 doses of 15 mg in last 24 hours   -Morphine 2 mg IV q4h PRN: 0 doses in last 24    Bowel regimen:   -  Senokot-S 2 tablets BID   -  Glycolax packet daily PRN    Spiritual assessment: no spiritual distress identified  Bereavement and grief: to be determined  Referrals to: none today  SUBJECTIVE:     Current medical issues leading to Palliative Medicine involvement include   Active Hospital Problems    Diagnosis Date Noted    Palliative care encounter [Z51.5]      Priority: Medium    Goals of care, counseling/discussion [Z71.89]      Priority: Medium    DNR (do not resuscitate) discussion [Z71.89]      Priority: Medium    Intractable pain [R52] 06/20/2022     Priority: Medium    Right hip pain [M25.551] 06/20/2022     Priority: Medium    Lung mass [R91.8] 06/20/2022     Priority: Medium    Thyroid disease [E07.9] 06/20/2022     Priority: Medium    Gastroesophageal reflux disease without esophagitis [K21.9]      Priority: Medium    Anxiety [F41.9]      Priority: Medium    Hiatal hernia [K44.9]      Priority: Medium    Hyperlipidemia LDL goal <100 [E78.5] 02/20/2018       Details of Conversation:  Chart reviewed and patient seen. Patient is awake in bed, spouse is at the bedside. Patient much more relaxed today. Patient states her pain level is now a 4/10 which is tolerable for her. Patient states that she has been able to be mobile and just got back from walking to the bathroom. Patient satisfied with current pain level and wishes to continue with current regimen. Patient had 1 small bowel movement. She would like to increase senokot-s to 2 tablets twice daily. Patient states that appetite has been good. She did have 1 episode of vomiting yesterday however feels better today. Patient would like to be followed by palliative medicine outpatient clinic.       OBJECTIVE:   Prognosis: depends upon goals and unknown    Physical Exam:  BP (!) 97/55   Pulse 77   Temp 98 °F (36.7 °C) (Oral)   Resp 18   Ht 5' 4\" (1.626 m)   Wt 167 lb (75.8 kg)   SpO2 93%   BMI 28.67 kg/m²   Constitutional:  Elderly, NAD, awake, alert  Eyes: no scleral icterus, normal lids, no discharge  ENMT:  Normocephalic, atraumatic, mucosa moist, EOMI  Neck:  trachea midline, no JVD  Lungs: RA, diminished bilaterally, no audible rhonchi or wheezes noted, respirations unlabored, no retractions  Heart:  RRR, distant heart tones, no murmur, rub, or gallop noted during exam  Abd:  Soft, non tender, non distended, bowel sounds active  Ext:  Moving all extremities, no edema, pulses present  Skin:  Warm and dry  Neuro: Alert, grossly nonfocal; following commands    Objective data reviewed: labs, images, records, medication use, vitals and chart    Discussed patient and the plan of care with the other IDT members: Palliative Medicine IDT Team    Time/Communication  Greater than 50% of time spent, total 15 minutes in counseling and coordination of care at the bedside regarding symptom management. Thank you for allowing Palliative Medicine to participate in the care of Naresh Swift.

## 2022-06-22 NOTE — PROGRESS NOTES
Palliative medicine outpatient clinic appointment made for July 12th at 10 AM.  Placed on discharge instructions.

## 2022-06-22 NOTE — PLAN OF CARE
Problem: Safety - Adult  Goal: Free from fall injury  6/21/2022 2243 by Patrick Mcintosh RN  Outcome: Progressing  6/21/2022 1631 by Patrick Mcintosh RN  Outcome: Progressing     Problem: Pain  Goal: Verbalizes/displays adequate comfort level or baseline comfort level  6/21/2022 2243 by Patrick Mcintosh RN  Outcome: Progressing  6/21/2022 1631 by Patrick Mcintosh RN  Outcome: Progressing

## 2022-06-22 NOTE — PROGRESS NOTES
Physical Therapy  Facility/Department: [address] MED SURG/TELE     Name: Haile De Anda  : 1950  MRN: [id number]  Date of Service: 2022    PT eval was attempted again this am and pt is still on bedrest as of 10:30 am 22. Will re-attempt PT eval another time when pt is cleared for OOB activity. Mary Jane Everett., P.T.   License Number: [id number]

## 2022-06-23 LAB
ABO/RH: NORMAL
ANION GAP SERPL CALCULATED.3IONS-SCNC: 13 MMOL/L (ref 7–16)
ANTIBODY SCREEN: NORMAL
BLOOD BANK DISPENSE STATUS: NORMAL
BLOOD BANK PRODUCT CODE: NORMAL
BPU ID: NORMAL
BUN BLDV-MCNC: 16 MG/DL (ref 6–23)
C-REACTIVE PROTEIN: 14.3 MG/DL (ref 0–0.4)
CALCIUM SERPL-MCNC: 9.3 MG/DL (ref 8.6–10.2)
CHLORIDE BLD-SCNC: 94 MMOL/L (ref 98–107)
CO2: 24 MMOL/L (ref 22–29)
CREAT SERPL-MCNC: 0.7 MG/DL (ref 0.5–1)
DESCRIPTION BLOOD BANK: NORMAL
GFR AFRICAN AMERICAN: >60
GFR NON-AFRICAN AMERICAN: >60 ML/MIN/1.73
GLUCOSE BLD-MCNC: 182 MG/DL (ref 74–99)
HCT VFR BLD CALC: 24.1 % (ref 34–48)
HEMOGLOBIN: 7 G/DL (ref 11.5–15.5)
MCH RBC QN AUTO: 25.3 PG (ref 26–35)
MCHC RBC AUTO-ENTMCNC: 29 % (ref 32–34.5)
MCV RBC AUTO: 87 FL (ref 80–99.9)
PDW BLD-RTO: 14.7 FL (ref 11.5–15)
PLATELET # BLD: 513 E9/L (ref 130–450)
PMV BLD AUTO: 9.6 FL (ref 7–12)
POTASSIUM SERPL-SCNC: 3.8 MMOL/L (ref 3.5–5)
PROCALCITONIN: 0.08 NG/ML (ref 0–0.08)
RBC # BLD: 2.77 E12/L (ref 3.5–5.5)
SODIUM BLD-SCNC: 131 MMOL/L (ref 132–146)
WBC # BLD: 10.1 E9/L (ref 4.5–11.5)

## 2022-06-23 PROCEDURE — 86923 COMPATIBILITY TEST ELECTRIC: CPT

## 2022-06-23 PROCEDURE — 99231 SBSQ HOSP IP/OBS SF/LOW 25: CPT | Performed by: NURSE PRACTITIONER

## 2022-06-23 PROCEDURE — P9016 RBC LEUKOCYTES REDUCED: HCPCS

## 2022-06-23 PROCEDURE — 36430 TRANSFUSION BLD/BLD COMPNT: CPT

## 2022-06-23 PROCEDURE — 87206 SMEAR FLUORESCENT/ACID STAI: CPT

## 2022-06-23 PROCEDURE — 6370000000 HC RX 637 (ALT 250 FOR IP): Performed by: NURSE PRACTITIONER

## 2022-06-23 PROCEDURE — 1200000000 HC SEMI PRIVATE

## 2022-06-23 PROCEDURE — 85027 COMPLETE CBC AUTOMATED: CPT

## 2022-06-23 PROCEDURE — 6370000000 HC RX 637 (ALT 250 FOR IP): Performed by: FAMILY MEDICINE

## 2022-06-23 PROCEDURE — 86140 C-REACTIVE PROTEIN: CPT

## 2022-06-23 PROCEDURE — 86850 RBC ANTIBODY SCREEN: CPT

## 2022-06-23 PROCEDURE — 2580000003 HC RX 258: Performed by: NURSE PRACTITIONER

## 2022-06-23 PROCEDURE — 36415 COLL VENOUS BLD VENIPUNCTURE: CPT

## 2022-06-23 PROCEDURE — 80048 BASIC METABOLIC PNL TOTAL CA: CPT

## 2022-06-23 PROCEDURE — 84145 PROCALCITONIN (PCT): CPT

## 2022-06-23 PROCEDURE — 87070 CULTURE OTHR SPECIMN AEROBIC: CPT

## 2022-06-23 PROCEDURE — 86901 BLOOD TYPING SEROLOGIC RH(D): CPT

## 2022-06-23 PROCEDURE — 99233 SBSQ HOSP IP/OBS HIGH 50: CPT | Performed by: INTERNAL MEDICINE

## 2022-06-23 PROCEDURE — 86900 BLOOD TYPING SEROLOGIC ABO: CPT

## 2022-06-23 PROCEDURE — 6370000000 HC RX 637 (ALT 250 FOR IP): Performed by: INTERNAL MEDICINE

## 2022-06-23 RX ORDER — CALCIUM CARBONATE 200(500)MG
500 TABLET,CHEWABLE ORAL 3 TIMES DAILY PRN
Status: DISCONTINUED | OUTPATIENT
Start: 2022-06-23 | End: 2022-06-24 | Stop reason: HOSPADM

## 2022-06-23 RX ORDER — SODIUM CHLORIDE 9 MG/ML
INJECTION, SOLUTION INTRAVENOUS PRN
Status: DISCONTINUED | OUTPATIENT
Start: 2022-06-23 | End: 2022-06-24

## 2022-06-23 RX ORDER — CYCLOBENZAPRINE HCL 10 MG
10 TABLET ORAL 3 TIMES DAILY PRN
Status: DISCONTINUED | OUTPATIENT
Start: 2022-06-23 | End: 2022-06-23

## 2022-06-23 RX ORDER — LACTULOSE 10 G/15ML
20 SOLUTION ORAL DAILY PRN
Status: DISCONTINUED | OUTPATIENT
Start: 2022-06-23 | End: 2022-06-24 | Stop reason: HOSPADM

## 2022-06-23 RX ORDER — OXYCODONE HYDROCHLORIDE 15 MG/1
15 TABLET ORAL EVERY 4 HOURS PRN
Status: DISCONTINUED | OUTPATIENT
Start: 2022-06-23 | End: 2022-06-24 | Stop reason: HOSPADM

## 2022-06-23 RX ORDER — LACTULOSE 10 G/15ML
20 SOLUTION ORAL ONCE
Status: COMPLETED | OUTPATIENT
Start: 2022-06-23 | End: 2022-06-23

## 2022-06-23 RX ORDER — CYCLOBENZAPRINE HCL 10 MG
5 TABLET ORAL 3 TIMES DAILY PRN
Status: DISCONTINUED | OUTPATIENT
Start: 2022-06-23 | End: 2022-06-24 | Stop reason: HOSPADM

## 2022-06-23 RX ORDER — PROMETHAZINE HYDROCHLORIDE 25 MG/ML
6.25 INJECTION, SOLUTION INTRAMUSCULAR; INTRAVENOUS EVERY 6 HOURS PRN
Status: DISCONTINUED | OUTPATIENT
Start: 2022-06-23 | End: 2022-06-23 | Stop reason: RX

## 2022-06-23 RX ADMIN — LACTULOSE 20 G: 20 SOLUTION ORAL at 12:10

## 2022-06-23 RX ADMIN — CALCIUM 500 MG: 500 TABLET ORAL at 08:50

## 2022-06-23 RX ADMIN — OXYCODONE 15 MG: 15 TABLET ORAL at 09:36

## 2022-06-23 RX ADMIN — PANTOPRAZOLE SODIUM 40 MG: 40 TABLET, DELAYED RELEASE ORAL at 05:09

## 2022-06-23 RX ADMIN — DOCUSATE SODIUM 50 MG AND SENNOSIDES 8.6 MG 2 TABLET: 8.6; 5 TABLET, FILM COATED ORAL at 08:50

## 2022-06-23 RX ADMIN — OXYCODONE 20 MG: 15 TABLET ORAL at 22:28

## 2022-06-23 RX ADMIN — DOCUSATE SODIUM 50 MG AND SENNOSIDES 8.6 MG 2 TABLET: 8.6; 5 TABLET, FILM COATED ORAL at 22:29

## 2022-06-23 RX ADMIN — CALCIUM CARBONATE 500 MG: 500 TABLET, CHEWABLE ORAL at 16:34

## 2022-06-23 RX ADMIN — OXYCODONE 15 MG: 15 TABLET ORAL at 13:58

## 2022-06-23 RX ADMIN — Medication 10 ML: at 22:30

## 2022-06-23 RX ADMIN — ONDANSETRON 4 MG: 4 TABLET, ORALLY DISINTEGRATING ORAL at 16:34

## 2022-06-23 RX ADMIN — OXYCODONE 20 MG: 15 TABLET ORAL at 18:19

## 2022-06-23 RX ADMIN — BENZONATATE 100 MG: 100 CAPSULE ORAL at 08:50

## 2022-06-23 RX ADMIN — CYCLOBENZAPRINE 5 MG: 10 TABLET, FILM COATED ORAL at 16:34

## 2022-06-23 RX ADMIN — LEVOTHYROXINE SODIUM 25 MCG: 25 TABLET ORAL at 05:09

## 2022-06-23 RX ADMIN — OXYCODONE 15 MG: 15 TABLET ORAL at 05:08

## 2022-06-23 RX ADMIN — CITALOPRAM HYDROBROMIDE 20 MG: 20 TABLET ORAL at 08:50

## 2022-06-23 ASSESSMENT — PAIN SCALES - GENERAL
PAINLEVEL_OUTOF10: 5
PAINLEVEL_OUTOF10: 0
PAINLEVEL_OUTOF10: 0
PAINLEVEL_OUTOF10: 8
PAINLEVEL_OUTOF10: 7
PAINLEVEL_OUTOF10: 9
PAINLEVEL_OUTOF10: 6
PAINLEVEL_OUTOF10: 9
PAINLEVEL_OUTOF10: 6

## 2022-06-23 ASSESSMENT — PAIN DESCRIPTION - ORIENTATION
ORIENTATION: LEFT
ORIENTATION: LEFT
ORIENTATION: ANTERIOR
ORIENTATION: LEFT
ORIENTATION: MID

## 2022-06-23 ASSESSMENT — PAIN DESCRIPTION - DESCRIPTORS
DESCRIPTORS: ACHING;SORE
DESCRIPTORS: SORE;TENDER
DESCRIPTORS: ACHING

## 2022-06-23 ASSESSMENT — PAIN DESCRIPTION - FREQUENCY: FREQUENCY: CONTINUOUS

## 2022-06-23 ASSESSMENT — PAIN DESCRIPTION - LOCATION
LOCATION: CHEST
LOCATION: CHEST
LOCATION: OTHER (COMMENT)
LOCATION: ABDOMEN
LOCATION: BACK

## 2022-06-23 ASSESSMENT — PAIN SCALES - WONG BAKER
WONGBAKER_NUMERICALRESPONSE: 0
WONGBAKER_NUMERICALRESPONSE: 0

## 2022-06-23 ASSESSMENT — PAIN - FUNCTIONAL ASSESSMENT
PAIN_FUNCTIONAL_ASSESSMENT: PREVENTS OR INTERFERES SOME ACTIVE ACTIVITIES AND ADLS

## 2022-06-23 ASSESSMENT — PAIN DESCRIPTION - ONSET: ONSET: ON-GOING

## 2022-06-23 ASSESSMENT — PAIN DESCRIPTION - PAIN TYPE: TYPE: INTRACTABLE PAIN

## 2022-06-23 NOTE — PLAN OF CARE
Problem: Safety - Adult  Goal: Free from fall injury  6/23/2022 0207 by Brody Francis  Outcome: Progressing  6/22/2022 2236 by Juany Collier RN  Outcome: Progressing     Problem: Pain  Goal: Verbalizes/displays adequate comfort level or baseline comfort level  Recent Flowsheet Documentation  Taken 6/22/2022 2315 by Brody Francis  Verbalizes/displays adequate comfort level or baseline comfort level: Encourage patient to monitor pain and request assistance  6/22/2022 2236 by Juany Collier RN  Outcome: Progressing     Problem: Discharge Planning  Goal: Discharge to home or other facility with appropriate resources  Recent Flowsheet Documentation  Taken 6/22/2022 2315 by Brody Francis  Discharge to home or other facility with appropriate resources: Identify barriers to discharge with patient and caregiver

## 2022-06-23 NOTE — PROGRESS NOTES
non-distended, normal bowel sound  Extremities: no cyanosis, no clubbing and no edema  Neurologic: speech normal         Recent Labs     06/20/22  1115 06/21/22  0245    134   K 3.9 4.3   CL 99 100   CO2 22 23   BUN 14 13   CREATININE 0.6 0.7   GLUCOSE 106* 106*   CALCIUM 9.5 9.3       Recent Labs     06/20/22  1115 06/21/22  0245 06/21/22  1539   WBC 8.6 9.4  --    RBC 3.25* 2.97*  --    HGB 8.4* 7.7*  --    HCT 27.7* 25.2* 26.2*   MCV 85.2 84.8  --    MCH 25.8* 25.9*  --    MCHC 30.3* 30.6*  --    RDW 14.7 14.6  --    * 487*  --    MPV 9.5 9.4  --          Assessment:    Active Problems:    Intractable pain    Right hip pain    Lung mass    Thyroid disease    Gastroesophageal reflux disease without esophagitis    Anxiety    Hiatal hernia    Palliative care encounter    Goals of care, counseling/discussion    DNR (do not resuscitate) discussion    Hyperlipidemia LDL goal <100  Resolved Problems:    * No resolved hospital problems. *      Plan:    1. Lung mass- concern for new lung cancer:Pt presented to the ER with right hip/ abdominal pain. She had been having abdominal pain for several months. She was seen in ER 5/31 and diagnosed with constipation. CTA lungs on 6/1 revealed multiple masses in both lungs some with central cavitation. She was scheduled to have a lung biopsy this week. She came to ER due to uncontrolled pain. S/p IR guided biopsy on 6/21. C/o a lot of pain overnight ? Muscle spasm. Reporting pain better controlled currently- monitor. 2. Intractable abdominal pain/bone pain: likely related to above. CT abdomen reviewed- showing lytic lesion of the right pubic symphysis. Palliative care consulted for likely neoplasm related pain. Currently on IV morphine prn. Oxy IR prn    3. Post obstructive pneumonia: attending discussed case with ID on floor. Pt afebrile/ no leukocytosis. Hold off on abx for now     4. Anemia: likely of chronic inflammation: no s/s bleeding: Hem/onc following. Anemia work up. Hg 7.7. repeat pending. 5. Thyroid disease: synthroid    6. Deconditioning: PT/OT - am pac 18     7. Acute respiratory failure with hypoxia: related to #1- currently on 2 L NC . Will need ambulatory pulse ox prior to dc.     8. Constipation: bowel regimen. Takes lactulose prn at home. NOTE: This report was transcribed using voice recognition software. Every effort was made to ensure accuracy; however, inadvertent computerized transcription errors may be present.   Electronically signed by MICHELLE Lynn on 6/23/2022 at 8:42 AM

## 2022-06-23 NOTE — CARE COORDINATION
CASE MANAGEMENT. .. Chart reviewed. CT chest revealed multiple pedro pulm nodules, likely lung ca, and postobstructed pneumonia-no need for atbs at this time. Hemonc following. Monitoring labs closely. Palliative also on board and will be following as outpt-has appt scheduled for 7/12/22. Pending patient progress, anticipate dc tomorrow. PT 18/OT 19. Plan is home. No needs. Will follow.

## 2022-06-23 NOTE — PROGRESS NOTES
Palliative Care Department  [phone]  Palliative Care Progress Note  Provider PORSHA Corona - CNP     Balbir New  [id number]  Hospital Day: 4  Date of Initial Consult: 6/21/2022  Referring Provider: Santos Le MD  Palliative Medicine was consulted for assistance with: Symptom Management     HPI:   Balbir Billingsley is a 66 y.o. with a medical history of HLD, GERD, hypothyroidism, depression, newly lung CA who was admitted on 6/20/2022 from home with a CHIEF COMPLAINT of right leg pain and abdominal tenderness. On 5/31, patient presented to ED with abdominal tenderness and right leg pain. Imaging was significant for large masses in the bilateral lung fields and a 2 cm lytic lesion on the right side of the symphysis pubis. Patient was seen in oncology office on 6/10. Patient presented back to ED on 6/20 with right leg pain and abdominal tenderness. Patient was admitted for further management and taken for CT biopsy of the lung on 6/21. Palliative medicine is consulted for symptom management. ASSESSMENT/PLAN:     Pertinent Hospital Diagnoses      Concern for new lung CA   Lytic lesion of the symphysis pubis      Palliative Care Encounter / Counseling Regarding Goals of Care  Please see detailed goals of care discussion as below   At this time, Balbir Billingsley, Does have capacity for medical decision-making.   Capacity is time limited and situation/question specific   Outcome of goals of care meeting: improve pain and quality of life, symptom management regimen adjustments as below   Code status Full Code   Advanced Directives: no POA or living will in [facility]   Surrogate/Legal NOK:  o Anna Serrato, spouse, [phone]  o Estelle Mejia, child, [phone]      Neoplasm related pain:   -Oxy IR 10mg or 15mg q4h PRN: 6 doses of 15 mg in last 24 hours--increase to oxy IR 15mg or 20mg   -Morphine 2 mg IV q4h PRN: 2 doses in last 24 hours    Muscle spasms:   -Cyclobenzaprine 5mg TID PRN    Bowel regimen:   -  Senokot-S 2 tablets BID   -  Glycolax packet daily PRN   -  Lactulose 20 g daily as needed    Spiritual assessment: no spiritual distress identified  Bereavement and grief: to be determined  Referrals to: none today  SUBJECTIVE:     Current medical issues leading to Palliative Medicine involvement include   Active Hospital Problems    Diagnosis Date Noted    Palliative care encounter [Z51.5]      Priority: Medium    Goals of care, counseling/discussion [Z71.89]      Priority: Medium    DNR (do not resuscitate) discussion [Z71.89]      Priority: Medium    Intractable pain [R52] 06/20/2022     Priority: Medium    Right hip pain [M25.551] 06/20/2022     Priority: Medium    Lung mass [R91.8] 06/20/2022     Priority: Medium    Thyroid disease [E07.9] 06/20/2022     Priority: Medium    Gastroesophageal reflux disease without esophagitis [K21.9]      Priority: Medium    Anxiety [F41.9]      Priority: Medium    Hiatal hernia [K44.9]      Priority: Medium    Hyperlipidemia LDL goal <100 [E78.5] 02/20/2018       Details of Conversation:  Chart reviewed and patient seen. Patient is awake in bed, spouse and children at bedside. Patient states she is not feeling well today. She rates pain 7.5/10 however she is due for a dose of oxy IR. Patient states that after oxycodone IR is administered, pain is brought down to a 5/10. Patient states she is now having muscle spasms in her L chest that are significantly painful. Patient states that she had an episode of spasming last evening that lasted from 1730 p.m. until 11:30 PM.  We discussed starting Flexeril and patient would like to try this. Patient also started on lactulose daily as needed for bowel movements. Patient denies other needs from palliative medicine at this time.   Will follow    OBJECTIVE:   Prognosis: depends upon goals and unknown    Physical Exam:  /75   Pulse 73   Temp 98 °F (36.7 °C) (Oral)   Resp 18   Ht 5' 4\" (1.626 m)   Wt 170 lb 4.8 oz (77.2 kg)   SpO2 94%   BMI 29.23 kg/m²   Constitutional:  Elderly, NAD, awake, alert  Eyes: no scleral icterus, normal lids, no discharge  ENMT:  Normocephalic, atraumatic, mucosa moist, EOMI  Neck:  trachea midline, no JVD  Lungs: RA, diminished bilaterally, no audible rhonchi or wheezes noted, respirations unlabored, no retractions  Heart:  RRR, distant heart tones, no murmur, rub, or gallop noted during exam  Abd:  Soft, non tender, non distended, bowel sounds active  Ext:  Moving all extremities, no edema, pulses present  Skin:  Warm and dry  Neuro: Alert, grossly nonfocal; following commands    Objective data reviewed: labs, images, records, medication use, vitals and chart    Discussed patient and the plan of care with the other IDT members: Palliative Medicine IDT Team    Time/Communication  Greater than 50% of time spent, total 15 minutes in counseling and coordination of care at the bedside regarding symptom management. Thank you for allowing Palliative Medicine to participate in the care of Terrence Prieto.

## 2022-06-23 NOTE — PROGRESS NOTES
[facility] Addendum    I have personally participated in the history, exam, medical decision making with Aleksandr Ulrich NP on the date of service, I have personally reviewed objective data and I agree with past medical, family, and social history as well as assessment and plan unless otherwise noted. Objective  Physical Exam  Vitals: /78   Pulse 74   Temp 98.2 °F (36.8 °C) (Oral)   Resp 16   Ht 5' 4\" (1.626 m)   Wt 170 lb 4.8 oz (77.2 kg)   SpO2 94%   BMI 29.23 kg/m²   General: well-developed, well-nourished, no acute distress, cooperative  Skin: generally warm, dry, and intact, with normal color  HEENT: normocephalic, atraumatic, no gross abnormalities  Respiratory: clear to auscultation bilaterally without respiratory distress  Cardiovascular: regular rate and rhythm without murmur / rub / gallop  Abdominal: soft, nontender, nondistended, normoactive bowel sounds  Extremities: no obvious edema or deformity  Neurologic: awake, alert, no gross deficits  Psychiatric: normal affect, cooperative    Assessment / Plan  · Lesions of the lungs and pelvis, suspect malignancy  · Anemia of chronic dz  · Hx HPL, GERD, hypothyroid, depression    Pt is a 71F w PMH HPL and arthritis who was admitted 6/20 with abdominal pain. Originally seen in ED 5/31 for abd pain, CT revealed multiple lung masses and follow-up CTA 6/1 showed central cavitation of some of the lesions and pt scheduled for lung biopsy to be done 6/21. Was also referred to pain mgmt and had been sent for CT of back at an outside of facility; this was completed but pt had not met w pain mgmt again yet. Abd pain was unbearable and pt presented here. CT showed lung lesions but also pelvic lesions. Pt was admitted with intractable pain with consults to oncology and palliative care.  elevated but CEA and 15-3 were normal.  S/p lung biopsy 6/21, biopsy results pending. For outpatient PET scan.      , son, daughter present today. Pt had a lot of pain at site of biopsy overnight, possibly muscular spasms, told pt if it continues to be an issue through the day to let us know and we can consider low-dose muscle relaxant. Otherwise there was the question of what to do about her postobstructive pna seen on imaging. She is on 2L with a junky cough but having no fevers or leukocytosis and has other reasons for needing O2 and having cough (namely the cancer). Discussed this in person w Dr. Marianne Pierre this morning and it is our mutual opinion that this doesn't currently require antibiotics and ID consult is being deferred for this reason. Will check a sputum culture, if for nothing else than to compare it to future cx if she has an issue. Otherwise as per Shirlene's note. Please see orders for further plan of care.     Electronically signed by Charmayne Milliner, DO on 6/23/2022 at 9:36 AM

## 2022-06-23 NOTE — PROGRESS NOTES
[facility]  Hematology/Oncology  Consult      Patient Name: Jarod Luna  YOB: 1950  PCP: Kandis Edwards DO   Referring Provider:      Reason for Consultation:   Chief Complaint   Patient presents with    Leg Pain     Hip pain for 2-3 weeks    Difficulty Walking    Abdominal Pain      Subjective: Feels ok today. States she is short of breath requiring oxygen 2 L. She is very anxious about biopsy results and next steps. Family at bedside with questions answered. History of Present Illness: This is a 79-year-old female patient with a PMH of HLD, GERD, hypothyroidism, and depression who follows with Dr. Alexia Waggoner. She was initially seen on 6/10/2022 for consult after after being seen in the ED a couple weeks before that and when she was found to have normocytic anemia with slight neutrophilic leukocytosis. CT chest no mediastinal lymphadenopathy however there were multiple large masses in the bilateral lung fields some of them was central cavitation largest in the left lower lobe measuring up to 7.5 cm. CTAP with a 2 cm lytic lesion on the right side of the symphysis pubis with a normal liver, spleen, adrenal glands, pancreas, and gallbladder. Her  230, with a normal CEA and CA 15-3. Patient is to have a CT-guided biopsy of the largest lung lesion for tissue diagnosis. Patient presented to the ED for current admission for evaluation of right leg pain, abdominal tenderness worsening this past week. CTAP with airspace disease in the left lower lung bilaterally. Constipation noted. Right pubic symphysis lytic lesion again identified. Patient is scheduled for a CT-guided biopsy of the lung while admitted. Palliative has been consulted for pain management. CBC with an Hgb of 7.7 MCV 84.8. Consultation for concerns of new lung cancer with worsening leg pain and drop of hemoglobin.      Review of systems: Over 10 systems reviewed and all were negative except as mentioned above.      Diagnostic Data:     Past Medical History:   Diagnosis Date    Arthritis     Hyperlipidemia     Right shoulder pain        Patient Active Problem List    Diagnosis Date Noted    Palliative care encounter     Goals of care, counseling/discussion     DNR (do not resuscitate) discussion     Anemia due to gastrointestinal blood loss 06/20/2022    Intractable pain 06/20/2022    Right hip pain 06/20/2022    Lung mass 06/20/2022    Thyroid disease 06/20/2022    Gastroesophageal reflux disease without esophagitis     Anxiety     Hiatal hernia     Chronic left shoulder pain 01/25/2019    Chest pain 02/20/2018    Hyperlipidemia LDL goal <100 02/20/2018    Acute cystitis 02/20/2018        Past Surgical History:   Procedure Laterality Date    COLONOSCOPY      CT NEEDLE BIOPSY LUNG PERCUTANEOUS  6/21/2022    CT NEEDLE BIOPSY LUNG PERCUTANEOUS 6/21/2022 [facility] CT    EYE SURGERY Bilateral     Cataracts    HYSTERECTOMY (CERVIX STATUS UNKNOWN)      SHOULDER ARTHROSCOPY Right 9/5/2019    RIGHT SHOULDER ARTHROSCOPY ROTATOR CUFF REPAIR POSSIBLE BICEPS TENODESIS performed by Uday Mark MD at [address] [facility] ARTHROSCOPY Left 5/21/2020    LEFT SHOULDER ARTHROSCOPY, ROTATOR CUFF REPAIR BICEPS TENODESIS (ARTHREX, INTERSCALENE BLOCK) performed by Uday Mark MD at [address] [facility] URETEROSCOPY Left 09/14/2015       Family History  Family History   Problem Relation Age of Onset    Breast Cancer Sister 77    Breast Cancer Niece 48       Social History    TOBACCO:   reports that she has never smoked. She has never used smokeless tobacco.  ETOH:   reports no history of alcohol use. Home Medications  Prior to Admission medications    Medication Sig Start Date End Date Taking?  Authorizing Provider   lactulose (CHRONULAC) 10 GM/15ML solution Take 30 mLs by mouth daily 6/10/22   Historical Provider, MD   fentaNYL (DURAGESIC) 25 MCG/HR Place 1 patch onto the skin every 72 hours. 6/15/22   Historical Provider, MD   benzonatate (TESSALON) 100 MG capsule Take 1 capsule by mouth daily 6/10/22   Historical Provider, MD   cephALEXin (KEFLEX) 500 MG capsule TAKE ONE CAPSULE BY MOUTH FOUR TIMES A DAY FOR 5 DAYS  Patient not taking: Reported on 6/20/2022 6/4/22   Historical Provider, MD   docusate sodium (COLACE) 100 mg capsule Take 1 capsule by mouth 2 times daily  Patient not taking: Reported on 6/20/2022 6/1/22   Viet Rush MD   polyethylene glycol [facility]) 17 GM/SCOOP powder Take 17 g by mouth daily as needed (constipation) 6/1/22 7/1/22  Viet Rush MD   levothyroxine (SYNTHROID) 25 MCG tablet Take 1 tablet by mouth Daily 5/25/22   Angela Buchanan DO   methylPREDNISolone (MEDROL DOSEPACK) 4 MG tablet Take by mouth. Patient not taking: Reported on 6/20/2022 5/24/22   Angela Buchanan DO   citalopram (CELEXA) 20 MG tablet Take 0.5 tablets by mouth daily for 6 days, THEN 1 tablet daily for 24 days.   Patient taking differently: Takes 20 mg daily 5/16/22 6/15/22  Mike Tomlinson DO   azelastine (ASTELIN) 0.1 % nasal spray 1 spray by Nasal route 2 times daily Use in each nostril as directed 4/28/22   Angela Buchanan DO   fluticasone (FLONASE) 50 MCG/ACT nasal spray 2 sprays by Each Nostril route daily  Patient not taking: Reported on 6/20/2022 4/8/22   Angela Buchanan DO   omeprazole (PRILOSEC) 20 MG delayed release capsule Take 20 mg by mouth daily    Historical Provider, MD   Evolocumab (REPATHA) 140 MG/ML SOSY INJECT 1ML INTO THE SKIN EVERY 14 DAYS  Patient not taking: Reported on 6/20/2022 1/27/22   Angela Buchanan DO   diclofenac (VOLTAREN) 50 MG EC tablet TAKE ONE TABLET BY MOUTH THREE TIMES A DAY WITH MEALS 11/11/21   Angela Ole Rudnicki, DO   aspirin 81 MG tablet Take 81 mg by mouth daily Hold/Dr Madhu Fried    Historical Provider, MD   Cholecalciferol (VITAMIN D3) 2000 UNITS CAPS Take 5,000 Units by mouth daily     Historical Provider, MD   Multiple Vitamins-Minerals (MULTIVITAMIN & MINERAL PO) Take 1 tablet by mouth daily    Historical Provider, MD   calcium carbonate 600 MG TABS tablet Take 1 tablet by mouth daily  Patient not taking: Reported on 6/20/2022    Historical Provider, MD   Omega-3 Fatty Acids (FISH OIL) 1000 MG CAPS Take 2,000 mg by mouth 2 times daily   Patient not taking: Reported on 6/20/2022    Historical Provider, MD       Allergies  Allergies   Allergen Reactions    Atorvastatin     Avelox [Moxifloxacin]     Codeine     Daypro [Oxaprozin] Itching    Demerol Hcl [Meperidine]     Levaquin [Levofloxacin In D5w]     Tape [Adhesive Tape] Itching     Ok to use paper tape per pt    Valium [Diazepam]            Objective  /78   Pulse 74   Temp 98.2 °F (36.8 °C) (Oral)   Resp 16   Ht 5' 4\" (1.626 m)   Wt 170 lb 4.8 oz (77.2 kg)   SpO2 94%   BMI 29.23 kg/m²     Physical Exam:   Performance Status:  General: AAO to person, place, time, in no acute distress,   Head and neck : PERRLA, EOMI . Sclera non icteric. Oropharynx : Clear  Neck: no JVD,  no adenopathy  Heart: Regular rate and regular rhythm, no murmur  Lungs: Clear to auscultation   Extremities: No edema,no cyanosis, no clubbing. Abdomen: Soft, non-tender;no masses, no organomegaly  Skin:  No rash. Neurologic:Cranial nerves grossly intact. No focal motor or sensory deficits .     Recent Laboratory Data-   Lab Results   Component Value Date    WBC 9.4 06/21/2022    HGB 7.7 (L) 06/21/2022    HCT 26.2 (L) 06/21/2022    MCV 84.8 06/21/2022     (H) 06/21/2022    LYMPHOPCT 24.7 06/21/2022    RBC 2.97 (L) 06/21/2022    MCH 25.9 (L) 06/21/2022    MCHC 30.6 (L) 06/21/2022    RDW 14.6 06/21/2022    NEUTOPHILPCT 61.2 06/21/2022    MONOPCT 11.2 06/21/2022    BASOPCT 0.5 06/21/2022    NEUTROABS 5.71 06/21/2022    LYMPHSABS 2.31 06/21/2022    MONOSABS 1.05 (H) 06/21/2022    EOSABS 0.18 06/21/2022    BASOSABS 0.05 06/21/2022       Lab Results   Component Value Date     06/21/2022    K 4.3 06/21/2022     06/21/2022    CO2 23 06/21/2022    BUN 13 06/21/2022    CREATININE 0.7 06/21/2022    GLUCOSE 106 (H) 06/21/2022    CALCIUM 9.3 06/21/2022    PROT 7.9 06/20/2022    LABALBU 3.6 06/20/2022    BILITOT 0.5 06/20/2022    ALKPHOS 78 06/20/2022    AST 19 06/20/2022    ALT 21 06/20/2022    LABGLOM >60 06/21/2022    GFRAA >60 06/21/2022       Lab Results   Component Value Date    IRON 14 (L) 06/21/2022    TIBC 242 (L) 06/21/2022    FERRITIN 214 06/21/2022           Radiology-    XR HIP BILATERAL W AP PELVIS (2 VIEWS)    Result Date: 6/1/2022  EXAMINATION: ONE XRAY VIEW OF THE PELVIS AND TWO XRAY VIEWS OF EACH OF THE BILATERAL HIPS 6/1/2022 12:42 am COMPARISON: None. HISTORY: ORDERING SYSTEM PROVIDED HISTORY: pain TECHNOLOGIST PROVIDED HISTORY: Reason for exam:->pain FINDINGS: There is no evidence of acute fracture or dislocation. No acute osseous, soft tissue or joint abnormality is seen. No radiopaque foreign body identified. Mild degenerative changes of the hip joints are noted bilaterally, as well as mild degenerative changes involving the sacroiliac joints and lower lumbar spine. No acute findings. Mild degenerative changes. CT ABDOMEN PELVIS W IV CONTRAST Additional Contrast? None    Result Date: 6/20/2022  EXAMINATION: CT OF THE ABDOMEN AND PELVIS WITH CONTRAST 6/20/2022 1:05 pm TECHNIQUE: CT of the abdomen and pelvis was performed with the administration of intravenous contrast. Multiplanar reformatted images are provided for review. Automated exposure control, iterative reconstruction, and/or weight based adjustment of the mA/kV was utilized to reduce the radiation dose to as low as reasonably achievable.  COMPARISON: 05/31/2022 HISTORY: ORDERING SYSTEM PROVIDED HISTORY: abdominal TECHNOLOGIST PROVIDED HISTORY: Additional Contrast?->None Reason for exam:->abdominal Decision Support Exception - unselect if not a suspected or confirmed emergency medical condition->Emergency Medical Condition (MA) FINDINGS: Lower Chest: Patchy consolidative infiltrate identified the lung bases bilaterally as well as the right middle lobe concerning for multi lobar pneumonia. Organs: The liver is homogeneous in appearance. No stones in the gallbladder. The spleen is unremarkable. Parapelvic cysts in the kidneys bilaterally. Adrenal glands are unremarkable. Symmetric enhancement of the renal parenchyma. Pancreas is homogeneous. GI/Bowel: The stomach is unremarkable. The small bowel is within normal limits. No mucosal abnormality. Stool seen scattered diffusely throughout the colon. No evidence of obvious obstruction or obstructing lesion. Pelvis: Pelvic organs reveal no wall thickening identified of the bladder. The uterus is been surgically removed. Diverticulosis with no evidence of diverticulitis. Peritoneum/Retroperitoneum: There is no abdominal retroperitoneal lymphadenopathy with mild to moderate atherosclerotic disease within the abdominal aorta and iliac vessels. There is no free fluid or free air. No abnormal mass or fluid collections identified. Bones/Soft Tissues: The structures reveal right pubic symphysis with a lytic lesion identified. Underlying malignancy is of concern. Degenerative changes seen within the spine. Airspace disease identified lower lung fields bilaterally. There is stool seen scattered throughout the colon to suggest clinical presentation of constipation. Stable abnormal appearance of the right pubic symphysis with lytic lesion identified concerning for malignancy or metastatic disease. CT ABDOMEN PELVIS W IV CONTRAST Additional Contrast? None    Result Date: 5/31/2022  EXAMINATION: CT OF THE ABDOMEN AND PELVIS WITH CONTRAST 5/31/2022 9:55 pm TECHNIQUE: CT of the abdomen and pelvis was performed with the administration of intravenous contrast. Multiplanar reformatted images are provided for review.  Automated exposure control, iterative reconstruction, and/or weight based adjustment of the mA/kV was utilized to reduce the radiation dose to as low as reasonably achievable. COMPARISON: None. HISTORY: ORDERING SYSTEM PROVIDED HISTORY: abd pain TECHNOLOGIST PROVIDED HISTORY: Reason for exam:->abd pain Additional Contrast?->None Decision Support Exception - unselect if not a suspected or confirmed emergency medical condition->Emergency Medical Condition (MA) FINDINGS: Lower Chest: 3.3 cm spiculated mass right middle lobe with large adjacent area of ground-glass appearance. Organs: 7.0 cm mass left lower lobe. Multiple smaller masses involving the right and left lower lobes. Findings worrisome for metastatic disease. Hiatal hernia. The liver, spleen, adrenal glands, pancreas and gallbladder are normal.  Bilateral peripelvic cyst. GI/bowel: Normal small bowel. Colonic fecal retention involving the ascending and transverse colon. Pelvis: Normal urinary bladder. Peritoneum/Retroperitoneum: No free fluid or free air. Bones/Soft Tissues: 2.0 cm lytic lesion involving the right-side of the symphysis pubis. Colonic fecal retention involving the ascending and transverse colon. Multiple masses involving the visualized lungs worrisome for metastatic disease. 0.0 cm lytic osseous lesion involving the right side of the symphysis pubis. Findings worrisome for metastatic disease. Hiatal hernia. CTA PULMONARY W CONTRAST    Result Date: 6/1/2022  EXAMINATION: CTA OF THE CHEST 6/1/2022 12:48 am TECHNIQUE: CTA of the chest was performed after the administration of intravenous contrast.  Multiplanar reformatted images are provided for review. MIP images are provided for review. Automated exposure control, iterative reconstruction, and/or weight based adjustment of the mA/kV was utilized to reduce the radiation dose to as low as reasonably achievable. COMPARISON: None.  HISTORY: ORDERING SYSTEM PROVIDED HISTORY: mets, + pleurisy TECHNOLOGIST PROVIDED HISTORY: Reason for exam:->mets, + pleurisy Decision Support Exception - unselect if not a suspected or confirmed emergency medical condition->Emergency Medical Condition (MA) FINDINGS: Pulmonary Arteries: Pulmonary arteries are adequately opacified for evaluation. No evidence of intraluminal filling defect to suggest pulmonary embolism. Main pulmonary artery is normal in caliber. Mediastinum: No evidence of mediastinal lymphadenopathy. The heart and pericardium demonstrate no acute abnormality. There is no acute abnormality of the thoracic aorta. Lungs/pleura: Multiple masses are noted in the bilateral lungs some of which demonstrating central cavitation. The largest of these is at the basal segment of the left lower lobe measuring up to 7.5 cm. Further evaluation and workup is recommended. No pleural effusion. Upper Abdomen: Small hiatal hernia. Soft Tissues/Bones: No acute bone or soft tissue abnormality. No evidence of pulmonary embolism. Multiple masses are noted in the bilateral lungs some of which demonstrating central cavitation. The largest of these is at the basal segment of the left lower lobe measuring up to 7.5 cm. Further evaluation and workup is recommended. RECOMMENDATIONS: Unavailable         ASSESSMENT/PLAN :  74-year-old female  - HLD, GERD, hypothyroidism, and depression   - Recently in ED. Normocytic anemia with slight neutrophilic leukocytosis. CT chest no mediastinal lymphadenopathy however there were multiple large masses in the bilateral lung fields some of them was central cavitation largest in the left lower lobe measuring up to 7.5 cm. CTAP with a 2 cm lytic lesion on the right side of the symphysis pubis.  230, with a normal CEA and CA 15-3.  - Concern for new lung ca with worsening anemia and leg pain to have biopsy.    - CTAP with airspace disease in the left lower lung bilaterally. Constipation noted. Right pubic symphysis lytic lesion again identified.   -S/p CT-guided biopsy of the lung lesion.    - Palliative consulted for pain management. - CBC with an Hgb of 7.7 MCV 84.8. Likely anemia of chronic inflammation. Will check reticulocyte count iron panel B12 lactate dehydrogenase, FOBT.    6/23/2022  -CBC hgb 7.0 platelets 257. Rhenda Chelsey Continue to trend daily and transfuse for Hgb <7.  -Iron 14, TIBC 242, iron sat 6, ferritin 214, B12 718, folate 19. Retic pct 2.8, immature retic fract 31.8, retic hgb equiv. 21.4. .   -S/p CT-guided lung biopsy 6/21/2022. Pathology pending will follow results. - Possible postobstructive pna seen on imaging. No antibiotics warranted per ID. -CTs as above. To transfuse with 1 unit of PRBC due to dyspnea and cardiovascular compromise from large lung tumor burden  -We will follow    PORSHA Melgar - CNP  Electronically signed 6/23/2022 at 9:01 AM   Patient seen and examined. Agree with CNP's assessment and plan. Note updated.     Le Mg MD

## 2022-06-24 VITALS
SYSTOLIC BLOOD PRESSURE: 116 MMHG | DIASTOLIC BLOOD PRESSURE: 59 MMHG | WEIGHT: 170 LBS | RESPIRATION RATE: 18 BRPM | HEART RATE: 79 BPM | OXYGEN SATURATION: 95 % | HEIGHT: 64 IN | BODY MASS INDEX: 29.02 KG/M2 | TEMPERATURE: 98.4 F

## 2022-06-24 LAB
ANION GAP SERPL CALCULATED.3IONS-SCNC: 12 MMOL/L (ref 7–16)
BUN BLDV-MCNC: 14 MG/DL (ref 6–23)
CALCIUM SERPL-MCNC: 9.6 MG/DL (ref 8.6–10.2)
CHLORIDE BLD-SCNC: 97 MMOL/L (ref 98–107)
CO2: 23 MMOL/L (ref 22–29)
CREAT SERPL-MCNC: 0.6 MG/DL (ref 0.5–1)
GFR AFRICAN AMERICAN: >60
GFR NON-AFRICAN AMERICAN: >60 ML/MIN/1.73
GLUCOSE BLD-MCNC: 163 MG/DL (ref 74–99)
HCT VFR BLD CALC: 24.8 % (ref 34–48)
HCT VFR BLD CALC: 27.3 % (ref 34–48)
HEMOGLOBIN: 7.7 G/DL (ref 11.5–15.5)
HEMOGLOBIN: 8.4 G/DL (ref 11.5–15.5)
MCH RBC QN AUTO: 26.4 PG (ref 26–35)
MCHC RBC AUTO-ENTMCNC: 30.8 % (ref 32–34.5)
MCV RBC AUTO: 85.8 FL (ref 80–99.9)
PDW BLD-RTO: 14.5 FL (ref 11.5–15)
PLATELET # BLD: 492 E9/L (ref 130–450)
PMV BLD AUTO: 9.5 FL (ref 7–12)
POTASSIUM SERPL-SCNC: 3.8 MMOL/L (ref 3.5–5)
RBC # BLD: 3.18 E12/L (ref 3.5–5.5)
SODIUM BLD-SCNC: 132 MMOL/L (ref 132–146)
WBC # BLD: 10.9 E9/L (ref 4.5–11.5)

## 2022-06-24 PROCEDURE — 97535 SELF CARE MNGMENT TRAINING: CPT

## 2022-06-24 PROCEDURE — 6370000000 HC RX 637 (ALT 250 FOR IP): Performed by: FAMILY MEDICINE

## 2022-06-24 PROCEDURE — 97530 THERAPEUTIC ACTIVITIES: CPT

## 2022-06-24 PROCEDURE — 85027 COMPLETE CBC AUTOMATED: CPT

## 2022-06-24 PROCEDURE — 36415 COLL VENOUS BLD VENIPUNCTURE: CPT

## 2022-06-24 PROCEDURE — 85018 HEMOGLOBIN: CPT

## 2022-06-24 PROCEDURE — 6370000000 HC RX 637 (ALT 250 FOR IP): Performed by: NURSE PRACTITIONER

## 2022-06-24 PROCEDURE — 85014 HEMATOCRIT: CPT

## 2022-06-24 PROCEDURE — 2580000003 HC RX 258: Performed by: NURSE PRACTITIONER

## 2022-06-24 PROCEDURE — 80048 BASIC METABOLIC PNL TOTAL CA: CPT

## 2022-06-24 PROCEDURE — APPSS45 APP SPLIT SHARED TIME 31-45 MINUTES: Performed by: NURSE PRACTITIONER

## 2022-06-24 PROCEDURE — 99233 SBSQ HOSP IP/OBS HIGH 50: CPT | Performed by: INTERNAL MEDICINE

## 2022-06-24 RX ORDER — CYCLOBENZAPRINE HCL 5 MG
5 TABLET ORAL 3 TIMES DAILY PRN
Qty: 10 TABLET | Refills: 0 | Status: CANCELLED | OUTPATIENT
Start: 2022-06-24 | End: 2022-07-01

## 2022-06-24 RX ORDER — ONDANSETRON 4 MG/1
4 TABLET, ORALLY DISINTEGRATING ORAL EVERY 8 HOURS PRN
Qty: 30 TABLET | Refills: 0 | Status: SHIPPED | OUTPATIENT
Start: 2022-06-24 | End: 2022-07-24

## 2022-06-24 RX ORDER — CYCLOBENZAPRINE HCL 5 MG
5 TABLET ORAL 3 TIMES DAILY PRN
Qty: 90 TABLET | Refills: 0 | Status: ON HOLD | OUTPATIENT
Start: 2022-06-24 | End: 2022-07-07 | Stop reason: HOSPADM

## 2022-06-24 RX ORDER — OXYCODONE HYDROCHLORIDE 20 MG/1
20 TABLET ORAL EVERY 4 HOURS PRN
Qty: 180 TABLET | Refills: 0 | Status: ON HOLD | OUTPATIENT
Start: 2022-06-24 | End: 2022-07-11

## 2022-06-24 RX ORDER — SENNA AND DOCUSATE SODIUM 50; 8.6 MG/1; MG/1
2 TABLET, FILM COATED ORAL 2 TIMES DAILY
Qty: 120 TABLET | Refills: 0 | Status: SHIPPED | OUTPATIENT
Start: 2022-06-24 | End: 2022-07-24

## 2022-06-24 RX ADMIN — PANTOPRAZOLE SODIUM 40 MG: 40 TABLET, DELAYED RELEASE ORAL at 06:19

## 2022-06-24 RX ADMIN — Medication 10 ML: at 08:57

## 2022-06-24 RX ADMIN — LEVOTHYROXINE SODIUM 25 MCG: 25 TABLET ORAL at 06:19

## 2022-06-24 RX ADMIN — OXYCODONE 20 MG: 15 TABLET ORAL at 03:57

## 2022-06-24 RX ADMIN — OXYCODONE 20 MG: 15 TABLET ORAL at 08:49

## 2022-06-24 RX ADMIN — BENZONATATE 100 MG: 100 CAPSULE ORAL at 08:57

## 2022-06-24 RX ADMIN — CYCLOBENZAPRINE 5 MG: 10 TABLET, FILM COATED ORAL at 15:40

## 2022-06-24 RX ADMIN — CITALOPRAM HYDROBROMIDE 20 MG: 20 TABLET ORAL at 08:57

## 2022-06-24 RX ADMIN — OXYCODONE 20 MG: 15 TABLET ORAL at 13:36

## 2022-06-24 RX ADMIN — DOCUSATE SODIUM 50 MG AND SENNOSIDES 8.6 MG 2 TABLET: 8.6; 5 TABLET, FILM COATED ORAL at 08:57

## 2022-06-24 RX ADMIN — CALCIUM 500 MG: 500 TABLET ORAL at 08:57

## 2022-06-24 ASSESSMENT — PAIN SCALES - WONG BAKER
WONGBAKER_NUMERICALRESPONSE: 0

## 2022-06-24 ASSESSMENT — PAIN DESCRIPTION - ORIENTATION
ORIENTATION: LEFT
ORIENTATION: RIGHT;LEFT

## 2022-06-24 ASSESSMENT — PAIN DESCRIPTION - LOCATION
LOCATION: CHEST
LOCATION: CHEST
LOCATION: BACK
LOCATION: CHEST
LOCATION: CHEST

## 2022-06-24 ASSESSMENT — PAIN DESCRIPTION - DESCRIPTORS
DESCRIPTORS: SPASM
DESCRIPTORS: SPASM;SHARP
DESCRIPTORS: SPASM;SHARP
DESCRIPTORS: ACHING;SORE
DESCRIPTORS: SPASM;SHARP

## 2022-06-24 ASSESSMENT — PAIN SCALES - GENERAL
PAINLEVEL_OUTOF10: 0
PAINLEVEL_OUTOF10: 5
PAINLEVEL_OUTOF10: 7
PAINLEVEL_OUTOF10: 7
PAINLEVEL_OUTOF10: 5
PAINLEVEL_OUTOF10: 7
PAINLEVEL_OUTOF10: 7

## 2022-06-24 ASSESSMENT — PAIN DESCRIPTION - ONSET: ONSET: ON-GOING

## 2022-06-24 ASSESSMENT — PAIN DESCRIPTION - FREQUENCY: FREQUENCY: CONTINUOUS

## 2022-06-24 NOTE — PROGRESS NOTES
[id number] Harriet Good for discharge from hematology. Can follow up with Iron transfusions out pt in office.

## 2022-06-24 NOTE — CARE COORDINATION
[place] [facility] out of network. [facility] is in network. Referral called to [facility]. Awaiting call back. Katharina Lux RN      Addendum: [facility] able to accept. Family and patient updated. Orders placed.

## 2022-06-24 NOTE — DISCHARGE SUMMARY
[facility] Physician Discharge Summary       Regis Dumont, APRN - CNP  [address]. [place] [address]  [phone]      Palliative medicine symptom management appointment     July 12, 2022 at [address]: [phone]    Radha nogueira [place] [phone]    Call in 1 week  Call PCP for follow up appoinment. MarleyZechariah [place]ton [place] [phone]      Go to scheduled appoitnment       Activity level: As tolerated     Dispo: Home with Shay Bhardwaj     Condition on discharge: Stable    DME ordere for oxygen 2 L continuous     Patient ID:  Trudy Victoria  [id number]  37 y.o.  1950    Admit date: 6/20/2022    Discharge date and time:  6/24/2022  1:44 PM    Admission Diagnoses: Active Problems:    Intractable pain    Right hip pain    Lung mass    Thyroid disease    Gastroesophageal reflux disease without esophagitis    Anxiety    Hiatal hernia    Palliative care encounter    Goals of care, counseling/discussion    DNR (do not resuscitate) discussion    Hyperlipidemia LDL goal <100  Resolved Problems:    * No resolved hospital problems. *      Discharge Diagnoses: Active Problems:    Intractable pain    Right hip pain    Lung mass    Thyroid disease    Gastroesophageal reflux disease without esophagitis    Anxiety    Hiatal hernia    Palliative care encounter    Goals of care, counseling/discussion    DNR (do not resuscitate) discussion    Hyperlipidemia LDL goal <100  Resolved Problems:    * No resolved hospital problems.  *      Consults:  IP CONSULT TO HEM/ONC  IP CONSULT TO PALLIATIVE CARE  IP CONSULT TO [address] Course:   Patient Trudy Victoria is a 66 y.o. presented with Anemia due to gastrointestinal blood loss [D50.0]  Intractable pain [R52]   Patient presented to the ER with concerns of intractable pain in right leg and chest. During admission she was followed by hematology/ oncology and Palliative medicine. Patient was followed and treated for;    1. Lung mass- concern for new lung cancer:Pt presented to the ER with right hip/ abdominal pain. She had been having abdominal pain for several months. She was seen in ER 5/31 and diagnosed with constipation. CTA lungs on 6/1 revealed multiple masses in both lungs some with central cavitation. She was scheduled to have a lung biopsy this week. She came to ER due to uncontrolled pain. S/p IR guided biopsy on 6/21. PT will need to follow up with hematology/ oncology early next week for results. Pt reporting she has an appointment on Tuesday.      2. Intractable abdominal pain/bone pain: likely related to above. CT abdomen reviewed- showing lytic lesion of the right pubic symphysis. Palliative care consulted for likely neoplasm related pain. Plan is for patient to continue to follow up outpatient with palliative medicine. 3. Post obstructive pneumonia: attending discussed case with ID on floor. Pt afebrile/ no leukocytosis. Hold off on abx for now      4. Anemia: likely of chronic inflammation: no s/s bleeding: Hem/onc following. Anemia work up. Hg stable.      5. Thyroid disease: synthroid     6. Deconditioning: PT/OT - am pac 18      7. Acute respiratory failure with hypoxia: related to #1- currently on 2 L NC .  2L NC ordered for dc.     8. Constipation: bowel regimen. Takes lactulose prn at home.      Patient discharged in stable condition with the following medication, instructions and follow up.      Discharge Exam:  General Appearance: alert and oriented to person, place and time and in no acute distress  Skin: warm and dry  Head: normocephalic and atraumatic  Neck: neck supple and non tender without mass   Pulmonary/Chest: crackles/ rhonchi right lung  Cardiovascular: normal rate, normal S1 and S2 and no carotid bruits  Abdomen: soft, non-tender, non-distended, normal bowel sound  Extremities: no cyanosis, no clubbing and no edema  Neurologic: speech normal        I/O last 3 completed shifts: In: 720 [P.O.:720]  Out: 400 [Urine:400]  I/O this shift:  In: 180 [P.O.:180]  Out: -       LABS:  Recent Labs     06/23/22  0953 06/24/22  0933   * 132   K 3.8 3.8   CL 94* 97*   CO2 24 23   BUN 16 14   CREATININE 0.7 0.6   GLUCOSE 182* 163*   CALCIUM 9.3 9.6       Recent Labs     06/23/22  0953 06/24/22  0339 06/24/22  0933   WBC 10.1  --  10.9   RBC 2.77*  --  3.18*   HGB 7.0* 7.7* 8.4*   HCT 24.1* 24.8* 27.3*   MCV 87.0  --  85.8   MCH 25.3*  --  26.4   MCHC 29.0*  --  30.8*   RDW 14.7  --  14.5   *  --  492*   MPV 9.6  --  9.5       No results for input(s): POCGLU in the last 72 hours. Imaging:  CT CHEST WO CONTRAST    Result Date: 6/21/2022  EXAMINATION: CT OF THE CHEST WITHOUT CONTRAST 6/21/2022 9:16 am TECHNIQUE: CT of the chest was performed without the administration of intravenous contrast. Multiplanar reformatted images are provided for review. Automated exposure control, iterative reconstruction, and/or weight based adjustment of the mA/kV was utilized to reduce the radiation dose to as low as reasonably achievable. COMPARISON: 6/1/2022 HISTORY: ORDERING SYSTEM PROVIDED HISTORY: mass TECHNOLOGIST PROVIDED HISTORY: Reason for exam:->mass FINDINGS: Mediastinum: The thoracic aorta is normal in caliber. Gross lymphadenopathy is not appreciated on the unenhanced images. .  There is no pericardial effusion. Lungs/pleura: Within the left lower lobe, there is a dense mass measuring approximately 7 x 5 cm. There is a mass seen within the right middle lobe laterally measuring approximately 3 x 4.2 cm. There is surrounding pulmonary infiltration consistent with postobstructive pneumonia. There is a cavitary mass seen within the right upper lobe measuring 3.0 x 3.0 cm. Ground-glass infiltrates are seen within the right upper lobe. There is a nodule seen within the left upper lobe measuring 6 mm.   There is a cavitary lesion seen within the left upper lobe laterally measuring 2 x 1.1 cm. Upper Abdomen: There is a moderate sized hiatal hernia. Soft Tissues/Bones:  Age related degenerative changes of the visualized osseous structures without focal destructive lesion. No gross lytic or blastic lesion is seen within the thoracic spine or ribs. 1. Multiple bilateral pulmonary masses, unchanged when compared with the patient's previous CT scan of the thorax of 6/1/2022. 2. Postobstructive pneumonia within the right upper lobe, right middle lobe and right lower lobe. 3. The patient is scheduled for CT-guided biopsy. RECOMMENDATIONS: Unavailable     CT GUIDED NEEDLE PLACEMENT    Result Date: 6/21/2022  PROCEDURE: CT NEEDLE BIOPSY LUNG PERCUTANEOUS; CT GUIDED NDL PLACEMENT MODERATE CONSCIOUS SEDATION 6/21/2022 HISTORY: ORDERING SYSTEM PROVIDED HISTORY: lung biopsy-pt was on outpt schedule for this procedure 06/21/22 TECHNOLOGIST PROVIDED HISTORY: Reason for exam:->lung biopsy-pt was on outpt schedule for this procedure 06/21/22 TECHNIQUE: Automated exposure control, iterative reconstruction, and/or weight based adjustment of the mA/kV was utilized to reduce the radiation dose to as low as reasonably achievable. Correlation is made with the patient's previous CT scan of 6/1/2022. CONTRAST: None SEDATION: The administration, documentation and monitoring of IV conscious sedation was under my direct supervision for time frame of 35 minutes. 100 mcg of IV fentanyl was administered. Versed was not administered. Interventional radiology nursing staff monitored the patient throughout the exam. DESCRIPTION OF PROCEDURE: Informed consent was obtained after a detailed explanation of the procedure including risks, benefits, and alternatives. Universal protocol was observed. FINDINGS: The patient was placed on the CT table and a time-out was performed. Axial images through the thorax were obtained.  The more solid mass seen within the left lower lobe measuring 5 cm x 7 cm was identified. Additional masses within the right lung were seen; however, it was felt that the mass within the left lower lobe is more solid and may give a more accurate biopsy result. The patient's left side was then prepped and draped in a sterile fashion using maximum sterile barrier technique. Following the uneventful administration of lidocaine, I introduced a 20-gauge coaxial needle into the mass. A total of four 20-gauge core biopsies were obtained and placed in formalin. The patient tolerated the procedure well and there were no complications. Successful 20-gauge core biopsy of the 5 cm x 7 cm solid mass seen within the left lower lobe. Administration of conscious sedation. CT ABDOMEN PELVIS W IV CONTRAST Additional Contrast? None    Result Date: 6/20/2022  EXAMINATION: CT OF THE ABDOMEN AND PELVIS WITH CONTRAST 6/20/2022 1:05 pm TECHNIQUE: CT of the abdomen and pelvis was performed with the administration of intravenous contrast. Multiplanar reformatted images are provided for review. Automated exposure control, iterative reconstruction, and/or weight based adjustment of the mA/kV was utilized to reduce the radiation dose to as low as reasonably achievable. COMPARISON: 05/31/2022 HISTORY: ORDERING SYSTEM PROVIDED HISTORY: abdominal TECHNOLOGIST PROVIDED HISTORY: Additional Contrast?->None Reason for exam:->abdominal Decision Support Exception - unselect if not a suspected or confirmed emergency medical condition->Emergency Medical Condition (MA) FINDINGS: Lower Chest: Patchy consolidative infiltrate identified the lung bases bilaterally as well as the right middle lobe concerning for multi lobar pneumonia. Organs: The liver is homogeneous in appearance. No stones in the gallbladder. The spleen is unremarkable. Parapelvic cysts in the kidneys bilaterally. Adrenal glands are unremarkable. Symmetric enhancement of the renal parenchyma. Pancreas is homogeneous.  GI/Bowel: The stomach is unremarkable. The small bowel is within normal limits. No mucosal abnormality. Stool seen scattered diffusely throughout the colon. No evidence of obvious obstruction or obstructing lesion. Pelvis: Pelvic organs reveal no wall thickening identified of the bladder. The uterus is been surgically removed. Diverticulosis with no evidence of diverticulitis. Peritoneum/Retroperitoneum: There is no abdominal retroperitoneal lymphadenopathy with mild to moderate atherosclerotic disease within the abdominal aorta and iliac vessels. There is no free fluid or free air. No abnormal mass or fluid collections identified. Bones/Soft Tissues: The structures reveal right pubic symphysis with a lytic lesion identified. Underlying malignancy is of concern. Degenerative changes seen within the spine. Airspace disease identified lower lung fields bilaterally. There is stool seen scattered throughout the colon to suggest clinical presentation of constipation. Stable abnormal appearance of the right pubic symphysis with lytic lesion identified concerning for malignancy or metastatic disease. XR CHEST 1 VIEW    Result Date: 6/21/2022  EXAMINATION: ONE XRAY VIEW OF THE CHEST 6/21/2022 12:01 pm COMPARISON: None. HISTORY: ORDERING SYSTEM PROVIDED HISTORY: poist lung biopsy TECHNOLOGIST PROVIDED HISTORY: TIMED FOR 1130 1 HOUR Reason for exam:->poist lung biopsy FINDINGS: A single view of the chest were obtained following the left lung biopsy 1 hour post biopsy. There is no left pneumothorax There are multiple masses seen within the right lung, the largest is within the right lower lobe. There are multiple masses seen within the left lung. The largest is seen within the left lower lobe. The left lower lobe mass was biopsied under CT guidance. The cardiac silhouette is within normals. 1. Successful CT-guided biopsy of the mass within the left lower lobe 2.  There is no left pneumothorax     CT NEEDLE BIOPSY LUNG/MEDIASTINUM PERCUTANEOUS    Result Date: 6/21/2022  PROCEDURE: CT NEEDLE BIOPSY LUNG PERCUTANEOUS; CT GUIDED NDL PLACEMENT MODERATE CONSCIOUS SEDATION 6/21/2022 HISTORY: ORDERING SYSTEM PROVIDED HISTORY: lung biopsy-pt was on outpt schedule for this procedure 06/21/22 TECHNOLOGIST PROVIDED HISTORY: Reason for exam:->lung biopsy-pt was on outpt schedule for this procedure 06/21/22 TECHNIQUE: Automated exposure control, iterative reconstruction, and/or weight based adjustment of the mA/kV was utilized to reduce the radiation dose to as low as reasonably achievable. Correlation is made with the patient's previous CT scan of 6/1/2022. CONTRAST: None SEDATION: The administration, documentation and monitoring of IV conscious sedation was under my direct supervision for time frame of 35 minutes. 100 mcg of IV fentanyl was administered. Versed was not administered. Interventional radiology nursing staff monitored the patient throughout the exam. DESCRIPTION OF PROCEDURE: Informed consent was obtained after a detailed explanation of the procedure including risks, benefits, and alternatives. Universal protocol was observed. FINDINGS: The patient was placed on the CT table and a time-out was performed. Axial images through the thorax were obtained. The more solid mass seen within the left lower lobe measuring 5 cm x 7 cm was identified. Additional masses within the right lung were seen; however, it was felt that the mass within the left lower lobe is more solid and may give a more accurate biopsy result. The patient's left side was then prepped and draped in a sterile fashion using maximum sterile barrier technique. Following the uneventful administration of lidocaine, I introduced a 20-gauge coaxial needle into the mass. A total of four 20-gauge core biopsies were obtained and placed in formalin. The patient tolerated the procedure well and there were no complications.      Successful 20-gauge core biopsy of the 5 cm x 7 cm solid mass seen within the left lower lobe. Administration of conscious sedation. Patient Instructions:      Medication List      START taking these medications    cyclobenzaprine 5 MG tablet  Commonly known as: FLEXERIL  Take 1 tablet by mouth 3 times daily as needed for Muscle spasms     ondansetron 4 MG disintegrating tablet  Commonly known as: ZOFRAN-ODT  Take 1 tablet by mouth every 8 hours as needed for Nausea or Vomiting     oxyCODONE 20 MG immediate release tablet  Commonly known as: ROXICODONE  Take 1 tablet by mouth every 4 hours as needed for Pain for up to 30 days. sennosides-docusate sodium 8.6-50 MG tablet  Commonly known as: SENOKOT-S  Take 2 tablets by mouth 2 times daily        CHANGE how you take these medications    citalopram 20 MG tablet  Commonly known as: CeleXA  Take 0.5 tablets by mouth daily for 6 days, THEN 1 tablet daily for 24 days. Start taking on: May 16, 2022  What changed: See the new instructions.         CONTINUE taking these medications    benzonatate 100 MG capsule  Commonly known as: TESSALON     calcium carbonate 600 MG Tabs tablet     fish oil 1000 MG Caps     lactulose 10 GM/15ML solution  Commonly known as: CHRONULAC     levothyroxine 25 MCG tablet  Commonly known as: Synthroid  Take 1 tablet by mouth Daily     MULTIVITAMIN & MINERAL PO     omeprazole 20 MG delayed release capsule  Commonly known as: PRILOSEC     polyethylene glycol 17 GM/SCOOP powder  Commonly known as: GLYCOLAX  Take 17 g by mouth daily as needed (constipation)     Vitamin D3 50 MCG (2000 UT) Caps        STOP taking these medications    aspirin 81 MG tablet     azelastine 0.1 % nasal spray  Commonly known as: ASTELIN     cephALEXin 500 MG capsule  Commonly known as: KEFLEX     diclofenac 50 MG EC tablet  Commonly known as: VOLTAREN     docusate sodium 100 MG capsule  Commonly known as: Colace     fentaNYL 25 MCG/HR  Commonly known as: DURAGESIC     fluticasone 50 MCG/ACT nasal spray  Commonly known as: FLONASE     methylPREDNISolone 4 MG tablet  Commonly known as: MEDROL DOSEPACK     Repatha 140 MG/ML Sosy  Generic drug: Evolocumab           Where to Get Your Medications      These medications were sent to [facility], [facility] [phone]  [address], [address] [place] [address]    Phone: [phone]   · cyclobenzaprine 5 MG tablet  · ondansetron 4 MG disintegrating tablet  · oxyCODONE 20 MG immediate release tablet  · sennosides-docusate sodium 8.6-50 MG tablet           Note that more than 30 minutes was spent in preparing discharge papers, discussing discharge with patient, medication review, etc.    Signed:  Electronically signed by MICHELLE Bryson on 6/24/2022 at 1:44 PM

## 2022-06-24 NOTE — PROGRESS NOTES
[facility]  Hematology/Oncology  Consult      Patient Name: Saniya Ambrocio  YOB: 1950  PCP: Leslie Mendieta DO   Referring Provider:      Reason for Consultation:   Chief Complaint   Patient presents with    Leg Pain     Hip pain for 2-3 weeks    Difficulty Walking    Abdominal Pain      Subjective: Feels better today. History of Present Illness: This is a 69-year-old female patient with a PMH of HLD, GERD, hypothyroidism, and depression who follows with Dr. Edmon Dakins. She was initially seen on 6/10/2022 for consult after after being seen in the ED a couple weeks before that and when she was found to have normocytic anemia with slight neutrophilic leukocytosis. CT chest no mediastinal lymphadenopathy however there were multiple large masses in the bilateral lung fields some of them was central cavitation largest in the left lower lobe measuring up to 7.5 cm. CTAP with a 2 cm lytic lesion on the right side of the symphysis pubis with a normal liver, spleen, adrenal glands, pancreas, and gallbladder. Her  230, with a normal CEA and CA 15-3. Patient is to have a CT-guided biopsy of the largest lung lesion for tissue diagnosis. Patient presented to the ED for current admission for evaluation of right leg pain, abdominal tenderness worsening this past week. CTAP with airspace disease in the left lower lung bilaterally. Constipation noted. Right pubic symphysis lytic lesion again identified. Patient is scheduled for a CT-guided biopsy of the lung while admitted. Palliative has been consulted for pain management. CBC with an Hgb of 7.7 MCV 84.8. Consultation for concerns of new lung cancer with worsening leg pain and drop of hemoglobin. Review of systems: Over 10 systems reviewed and all were negative except as mentioned above.       Diagnostic Data:     Past Medical History:   Diagnosis Date    Arthritis     Hyperlipidemia     Right shoulder pain        Patient Active Problem List    Diagnosis Date Noted    Palliative care encounter     Goals of care, counseling/discussion     DNR (do not resuscitate) discussion     Anemia due to gastrointestinal blood loss 06/20/2022    Intractable pain 06/20/2022    Right hip pain 06/20/2022    Lung mass 06/20/2022    Thyroid disease 06/20/2022    Gastroesophageal reflux disease without esophagitis     Anxiety     Hiatal hernia     Chronic left shoulder pain 01/25/2019    Chest pain 02/20/2018    Hyperlipidemia LDL goal <100 02/20/2018    Acute cystitis 02/20/2018        Past Surgical History:   Procedure Laterality Date    COLONOSCOPY      CT NEEDLE BIOPSY LUNG PERCUTANEOUS  6/21/2022    CT NEEDLE BIOPSY LUNG PERCUTANEOUS 6/21/2022 [facility] CT    EYE SURGERY Bilateral     Cataracts    HYSTERECTOMY (CERVIX STATUS UNKNOWN)      SHOULDER ARTHROSCOPY Right 9/5/2019    RIGHT SHOULDER ARTHROSCOPY ROTATOR CUFF REPAIR POSSIBLE BICEPS TENODESIS performed by Jennifer Valencia MD at [address] [facility] ARTHROSCOPY Left 5/21/2020    LEFT SHOULDER ARTHROSCOPY, ROTATOR CUFF REPAIR BICEPS TENODESIS (ARTHREX, INTERSCALENE BLOCK) performed by Jennifer Valencia MD at [address] [facility] URETEROSCOPY Left 09/14/2015       Family History  Family History   Problem Relation Age of Onset    Breast Cancer Sister 77    Breast Cancer Niece 48       Social History    TOBACCO:   reports that she has never smoked. She has never used smokeless tobacco.  ETOH:   reports no history of alcohol use. Home Medications  Prior to Admission medications    Medication Sig Start Date End Date Taking? Authorizing Provider   lactulose (CHRONULAC) 10 GM/15ML solution Take 30 mLs by mouth daily 6/10/22   Historical Provider, MD   fentaNYL (DURAGESIC) 25 MCG/HR Place 1 patch onto the skin every 72 hours.  6/15/22   Historical Provider, MD   benzonatate (TESSALON) 100 MG capsule Take 1 capsule by mouth daily 6/10/22   Historical Provider, MD cephALEXin (KEFLEX) 500 MG capsule TAKE ONE CAPSULE BY MOUTH FOUR TIMES A DAY FOR 5 DAYS  Patient not taking: Reported on 6/20/2022 6/4/22   Historical Provider, MD   docusate sodium (COLACE) 100 mg capsule Take 1 capsule by mouth 2 times daily  Patient not taking: Reported on 6/20/2022 6/1/22   Katt Arreaga MD   polyethylene glycol [facility]) 17 GM/SCOOP powder Take 17 g by mouth daily as needed (constipation) 6/1/22 7/1/22  Katt Arreaga MD   levothyroxine (SYNTHROID) 25 MCG tablet Take 1 tablet by mouth Daily 5/25/22   Cuauhtemoc Buchanan DO   methylPREDNISolone (MEDROL DOSEPACK) 4 MG tablet Take by mouth. Patient not taking: Reported on 6/20/2022 5/24/22   Cuauhtemoc Buchanan DO   citalopram (CELEXA) 20 MG tablet Take 0.5 tablets by mouth daily for 6 days, THEN 1 tablet daily for 24 days.   Patient taking differently: Takes 20 mg daily 5/16/22 6/15/22  Alireza Tomlinson,    azelastine (ASTELIN) 0.1 % nasal spray 1 spray by Nasal route 2 times daily Use in each nostril as directed 4/28/22   Cuauhtemoc Buchanan DO   fluticasone (FLONASE) 50 MCG/ACT nasal spray 2 sprays by Each Nostril route daily  Patient not taking: Reported on 6/20/2022 4/8/22   Cuauhtemoc Buchanan DO   omeprazole (PRILOSEC) 20 MG delayed release capsule Take 20 mg by mouth daily    Historical Provider, MD   Evolocumab (REPATHA) 140 MG/ML SOSY INJECT 1ML INTO THE SKIN EVERY 14 DAYS  Patient not taking: Reported on 6/20/2022 1/27/22   Cuauhtemoc Buchanan DO   diclofenac (VOLTAREN) 50 MG EC tablet TAKE ONE TABLET BY MOUTH THREE TIMES A DAY WITH MEALS 11/11/21   Cuauhtemoc Buchanan DO   aspirin 81 MG tablet Take 81 mg by mouth daily Wayne/Dr Porter Lipcorbin    Historical Provider, MD   Cholecalciferol (VITAMIN D3) 2000 UNITS CAPS Take 5,000 Units by mouth daily     Historical Provider, MD   Multiple Vitamins-Minerals (MULTIVITAMIN & MINERAL PO) Take 1 tablet by mouth daily    Historical Provider, MD   calcium carbonate 600 MG TABS tablet Take 1 tablet by mouth daily  Patient not taking: Reported on 6/20/2022    Historical Provider, MD   Omega-3 Fatty Acids (FISH OIL) 1000 MG CAPS Take 2,000 mg by mouth 2 times daily   Patient not taking: Reported on 6/20/2022    Historical Provider, MD       Allergies  Allergies   Allergen Reactions    Atorvastatin     Avelox [Moxifloxacin]     Codeine     Daypro [Oxaprozin] Itching    Demerol Hcl [Meperidine]     Levaquin [Levofloxacin In D5w]     Tape [Adhesive Tape] Itching     Ok to use paper tape per pt    Valium [Diazepam]            Objective  BP (!) 116/59   Pulse 79   Temp 98.4 °F (36.9 °C) (Oral)   Resp 18   Ht 5' 4\" (1.626 m)   Wt 170 lb (77.1 kg)   SpO2 95%   BMI 29.18 kg/m²     Physical Exam:   Performance Status:  General: AAO to person, place, time, in no acute distress,   Head and neck : PERRLA, EOMI . Sclera non icteric. Oropharynx : Clear  Neck: no JVD,  no adenopathy  Heart: Regular rate and regular rhythm, no murmur  Lungs: Clear to auscultation   Extremities: No edema,no cyanosis, no clubbing. Abdomen: Soft, non-tender;no masses, no organomegaly  Skin:  No rash. Neurologic:Cranial nerves grossly intact. No focal motor or sensory deficits .     Recent Laboratory Data-   Lab Results   Component Value Date    WBC 10.1 06/23/2022    HGB 7.7 (L) 06/24/2022    HCT 24.8 (L) 06/24/2022    MCV 87.0 06/23/2022     (H) 06/23/2022    LYMPHOPCT 24.7 06/21/2022    RBC 2.77 (L) 06/23/2022    MCH 25.3 (L) 06/23/2022    MCHC 29.0 (L) 06/23/2022    RDW 14.7 06/23/2022    NEUTOPHILPCT 61.2 06/21/2022    MONOPCT 11.2 06/21/2022    BASOPCT 0.5 06/21/2022    NEUTROABS 5.71 06/21/2022    LYMPHSABS 2.31 06/21/2022    MONOSABS 1.05 (H) 06/21/2022    EOSABS 0.18 06/21/2022    BASOSABS 0.05 06/21/2022       Lab Results   Component Value Date     (L) 06/23/2022    K 3.8 06/23/2022    CL 94 (L) 06/23/2022    CO2 24 06/23/2022    BUN 16 06/23/2022    CREATININE 0.7 06/23/2022    GLUCOSE 182 (H) 06/23/2022    CALCIUM 9.3 06/23/2022    PROT 7.9 06/20/2022    LABALBU 3.6 06/20/2022    BILITOT 0.5 06/20/2022    ALKPHOS 78 06/20/2022    AST 19 06/20/2022    ALT 21 06/20/2022    LABGLOM >60 06/23/2022    GFRAA >60 06/23/2022       Lab Results   Component Value Date    IRON 14 (L) 06/21/2022    TIBC 242 (L) 06/21/2022    FERRITIN 214 06/21/2022           Radiology-    XR HIP BILATERAL W AP PELVIS (2 VIEWS)    Result Date: 6/1/2022  EXAMINATION: ONE XRAY VIEW OF THE PELVIS AND TWO XRAY VIEWS OF EACH OF THE BILATERAL HIPS 6/1/2022 12:42 am COMPARISON: None. HISTORY: ORDERING SYSTEM PROVIDED HISTORY: pain TECHNOLOGIST PROVIDED HISTORY: Reason for exam:->pain FINDINGS: There is no evidence of acute fracture or dislocation. No acute osseous, soft tissue or joint abnormality is seen. No radiopaque foreign body identified. Mild degenerative changes of the hip joints are noted bilaterally, as well as mild degenerative changes involving the sacroiliac joints and lower lumbar spine. No acute findings. Mild degenerative changes. CT ABDOMEN PELVIS W IV CONTRAST Additional Contrast? None    Result Date: 6/20/2022  EXAMINATION: CT OF THE ABDOMEN AND PELVIS WITH CONTRAST 6/20/2022 1:05 pm TECHNIQUE: CT of the abdomen and pelvis was performed with the administration of intravenous contrast. Multiplanar reformatted images are provided for review. Automated exposure control, iterative reconstruction, and/or weight based adjustment of the mA/kV was utilized to reduce the radiation dose to as low as reasonably achievable.  COMPARISON: 05/31/2022 HISTORY: ORDERING SYSTEM PROVIDED HISTORY: abdominal TECHNOLOGIST PROVIDED HISTORY: Additional Contrast?->None Reason for exam:->abdominal Decision Support Exception - unselect if not a suspected or confirmed emergency medical condition->Emergency Medical Condition (MA) FINDINGS: Lower Chest: Patchy consolidative infiltrate identified the lung bases bilaterally as well as the right middle lobe concerning for multi lobar pneumonia. Organs: The liver is homogeneous in appearance. No stones in the gallbladder. The spleen is unremarkable. Parapelvic cysts in the kidneys bilaterally. Adrenal glands are unremarkable. Symmetric enhancement of the renal parenchyma. Pancreas is homogeneous. GI/Bowel: The stomach is unremarkable. The small bowel is within normal limits. No mucosal abnormality. Stool seen scattered diffusely throughout the colon. No evidence of obvious obstruction or obstructing lesion. Pelvis: Pelvic organs reveal no wall thickening identified of the bladder. The uterus is been surgically removed. Diverticulosis with no evidence of diverticulitis. Peritoneum/Retroperitoneum: There is no abdominal retroperitoneal lymphadenopathy with mild to moderate atherosclerotic disease within the abdominal aorta and iliac vessels. There is no free fluid or free air. No abnormal mass or fluid collections identified. Bones/Soft Tissues: The structures reveal right pubic symphysis with a lytic lesion identified. Underlying malignancy is of concern. Degenerative changes seen within the spine. Airspace disease identified lower lung fields bilaterally. There is stool seen scattered throughout the colon to suggest clinical presentation of constipation. Stable abnormal appearance of the right pubic symphysis with lytic lesion identified concerning for malignancy or metastatic disease. CT ABDOMEN PELVIS W IV CONTRAST Additional Contrast? None    Result Date: 5/31/2022  EXAMINATION: CT OF THE ABDOMEN AND PELVIS WITH CONTRAST 5/31/2022 9:55 pm TECHNIQUE: CT of the abdomen and pelvis was performed with the administration of intravenous contrast. Multiplanar reformatted images are provided for review. Automated exposure control, iterative reconstruction, and/or weight based adjustment of the mA/kV was utilized to reduce the radiation dose to as low as reasonably achievable.  COMPARISON: None. HISTORY: ORDERING SYSTEM PROVIDED HISTORY: abd pain TECHNOLOGIST PROVIDED HISTORY: Reason for exam:->abd pain Additional Contrast?->None Decision Support Exception - unselect if not a suspected or confirmed emergency medical condition->Emergency Medical Condition (MA) FINDINGS: Lower Chest: 3.3 cm spiculated mass right middle lobe with large adjacent area of ground-glass appearance. Organs: 7.0 cm mass left lower lobe. Multiple smaller masses involving the right and left lower lobes. Findings worrisome for metastatic disease. Hiatal hernia. The liver, spleen, adrenal glands, pancreas and gallbladder are normal.  Bilateral peripelvic cyst. GI/bowel: Normal small bowel. Colonic fecal retention involving the ascending and transverse colon. Pelvis: Normal urinary bladder. Peritoneum/Retroperitoneum: No free fluid or free air. Bones/Soft Tissues: 2.0 cm lytic lesion involving the right-side of the symphysis pubis. Colonic fecal retention involving the ascending and transverse colon. Multiple masses involving the visualized lungs worrisome for metastatic disease. 0.0 cm lytic osseous lesion involving the right side of the symphysis pubis. Findings worrisome for metastatic disease. Hiatal hernia. CTA PULMONARY W CONTRAST    Result Date: 6/1/2022  EXAMINATION: CTA OF THE CHEST 6/1/2022 12:48 am TECHNIQUE: CTA of the chest was performed after the administration of intravenous contrast.  Multiplanar reformatted images are provided for review. MIP images are provided for review. Automated exposure control, iterative reconstruction, and/or weight based adjustment of the mA/kV was utilized to reduce the radiation dose to as low as reasonably achievable. COMPARISON: None.  HISTORY: ORDERING SYSTEM PROVIDED HISTORY: mets, + pleurisy TECHNOLOGIST PROVIDED HISTORY: Reason for exam:->mets, + pleurisy Decision Support Exception - unselect if not a suspected or confirmed emergency medical condition->Emergency Medical Condition (MA) FINDINGS: Pulmonary Arteries: Pulmonary arteries are adequately opacified for evaluation. No evidence of intraluminal filling defect to suggest pulmonary embolism. Main pulmonary artery is normal in caliber. Mediastinum: No evidence of mediastinal lymphadenopathy. The heart and pericardium demonstrate no acute abnormality. There is no acute abnormality of the thoracic aorta. Lungs/pleura: Multiple masses are noted in the bilateral lungs some of which demonstrating central cavitation. The largest of these is at the basal segment of the left lower lobe measuring up to 7.5 cm. Further evaluation and workup is recommended. No pleural effusion. Upper Abdomen: Small hiatal hernia. Soft Tissues/Bones: No acute bone or soft tissue abnormality. No evidence of pulmonary embolism. Multiple masses are noted in the bilateral lungs some of which demonstrating central cavitation. The largest of these is at the basal segment of the left lower lobe measuring up to 7.5 cm. Further evaluation and workup is recommended. RECOMMENDATIONS: Unavailable         ASSESSMENT/PLAN :  71-year-old female  - HLD, GERD, hypothyroidism, and depression   - Recently in ED. Normocytic anemia with slight neutrophilic leukocytosis. CT chest no mediastinal lymphadenopathy however there were multiple large masses in the bilateral lung fields some of them was central cavitation largest in the left lower lobe measuring up to 7.5 cm. CTAP with a 2 cm lytic lesion on the right side of the symphysis pubis.  230, with a normal CEA and CA 15-3.  - Concern for new lung ca with worsening anemia and leg pain to have biopsy.    - CTAP with airspace disease in the left lower lung bilaterally. Constipation noted. Right pubic symphysis lytic lesion again identified. -S/p CT-guided biopsy of the lung lesion.    - Palliative consulted for pain management. - CBC with an Hgb of 7.7 MCV 84.8. Likely anemia of chronic inflammation.

## 2022-06-24 NOTE — PROGRESS NOTES
Occupational Therapy  OT BEDSIDE TREATMENT NOTE      Date:2022  Patient Name: Ashlyn Yeager  MRN: [id number]  : 1950  Room: [address] [place] [address]     Per OT Eval:      Evaluating OT: Reji Riley  [id number]     Referring Provider: PORSHA Solorzano - CNP  Specific Provider Orders/Date: \"OT eval and treat\" - 2022     Diagnosis: Anemia due to gastrointestinal blood loss [D50.0], Intractable pain [R52]  Patient recently diagnosed with lung cancer; patient found to have \"lytic lesion of the symphysis pubis\" (per hematology note from 2022).    Surgery: Patient underwent CT-guided needle biopsy of lung on 2022. Pertinent Medical History: arthritis, hyperlipidemia     Precautions: fall risk, O2 via nasal cannula     Assessment of Current Deficits:    [x]? Functional mobility             [x]?ADLs           [x]? Strength                  [x]? Cognition   [x]? Functional transfers           [x]? IADLs         [x]? Safety Awareness   [x]? Endurance   []? Fine Coordination              [x]? Balance      []? Vision/perception   [x]? Sensation     []? Gross Motor Coordination  []? ROM           []? Delirium                   []? Motor Control      OT PLAN OF CARE   OT POC is based on physician orders, patient diagnosis, and results of clinical assessment.   Frequency/Duration 2-5 days/week for 2 weeks PRN   Specific OT Treatment Interventions to Include:   * Instruction/training on adapted ADL techniques and AE recommendations to increase functional independence within precautions       * Training on energy conservation strategies, correct breathing pattern and techniques to improve independence/tolerance for self-care routine  * Functional transfer/mobility training/DME recommendations for increased independence, safety, and fall prevention  * Patient/Family education to increase follow through with safety techniques and functional independence  * Recommendation of environmental modifications for increased safety with functional transfers/mobility and ADLs  * Therapeutic exercise to improve motor endurance, ROM, and functional strength for ADLs/functional transfers  * Therapeutic activities to facilitate/challenge dynamic balance, stand tolerance for increased safety and independence with ADLs  * Neuro-muscular re-education: facilitation of righting/equilibrium reactions, midline orientation, scapular stability/mobility, normalization of muscle tone, and facilitation of volitional active controled movement     Recommended Adaptive Equipment: TBD      Home Living: Patient lives with her  in a two-floor home; patient's bedroom and bathroom are on the main living level. Bathroom Setup: walk-in shower (with seat, grab bars, and handheld shower head) and standard-height toilet  Equipment Owned: walker              Patient's family members noted that they can borrow a BSC from family members, if needed.     Prior Level of Function (PLOF): Patient was independent with ADLs; patient's  and patient share responsibility for completion of most IADLs. Patient's  and other family members can assist with IADLs, as needed. Patient was independent with functional mobility (without device) prior to this hospitalization. Driving: Yes - family members can assist with transportation     Pain Level: Patient reported experiencing pain in her chest, but did not rate her pain; nursing had provided patient with pain medication recently. Cognition: Patient alert and oriented x3. WFL command follow demonstrated. Patient pleasant, cooperative, and motivated to return to [facility] and home environment.   Memory: WFL  Sequencing: WFL  Problem Solving: WFL  Judgement/Safety: WFL grossly     Functional Assessment:                  AM-PAC Daily Activity Raw Score: 19/24    Initial Eval Status  Date: 6/22/2022 Treatment Status  Date: 6/24/2022 Short Term Goals = Long Term Goals   Feeding Independent   N/A   Grooming SBA for hand hygiene while standing at sink.   Independent / Mod I  (seated/standing at sink)   UB Dressing SBA   Mod I / Independent   LB Dressing Min A  Family members can assist with LB dressing tasks, as needed. Patient education provided regarding compensatory strategies to maximize independence/safety with LB dressing tasks.   Mod I / Independent - with use of AE, as needed/appropriate   Bathing Min A   Mod I / Independent - with use of AE/DME, as needed/appropriate   Toileting Min A Patient without need to complete toileting during this session.  Mod I / Independent   Bed Mobility  Not assessed.  Supine-to-Sit: SBA Independent in order to maximize patient's independence with ADLs, re-positioning, and other functional tasks. Functional Transfers Sit-to-Stand: CGA - Min A   From toilet and EOB Sit-to-Stand: CGA from EOB  Cues given to maximize safety with functional transfers.  Independent   Functional Mobility Min A   (with handheld assistance) within patient's bathroom and room. Patient declined use of walker during this session. SBA (with walker) within patient's room. Patient education provided regarding techniques to maximize safety with management of walker.  Mod I / Independent with functional mobility (with device, as needed/appropriate) in order to maximize independence with ADLs/IADLs and other functional tasks. Balance Sitting: Good  (at EOB)  Standing: Fair-  (with handheld assistance) Standing: Fair+  (with walker)  Good dynamic standing balance during completion of ADLs/IADLs and other functional tasks. Activity Tolerance Fair-  Limited secondary to pain. Fair  Patient will demonstrate Good understanding and consistent implementation of energy conservation techniques and work simplification techniques into ADL/IADL routines.      Additional Long-Term Goal: Patient will increase functional independence to PLOF in order to allow patient to live in least restrictive environment.      Comments: RN approved patient's participation in [address] [place] [address] activities. Upon arrival, patient supine in bed. At end of session, patient seated in bedside chair with call light and phone within reach, family members present, and all lines and tubes intact. Treatment: OT treatment provided this date included:    Instruction/training on safety and adapted techniques for completion of ADLs.   Instruction/training on safe functional mobility/transfer techniques.   Instruction/training on energy conservation/work simplification techniques for implementation into ADL and IADL routines.   Instruction/training regarding techniques to maximize safety with management of walker, particularly during ADLs and IADLs. Further skilled OT treatment indicated to increase patient's safety and independence with completion of ADL/IADL tasks in order to maximize patient's functional independence and quality of life. Education: Patient education provided regardin) potential benefits of DME to maximize independence/safety with ADLs in home environment, 2) techniques to maximize safety with management of O2 line, particularly during ADLs/IADLs and functional transfers/mobility, 3) importance of having family assistance, as needed, with ADLs/IADLs to ensure safety upon return home. Patient demonstrated Good understanding. · Patient has made progress towards set goals. · Continue OT plan of care. Time In: 1335  Time Out: 1405  Total Treatment Time: 25 minutes      Minutes Units   Therapeutic Ex 81904     Therapeutic Activities 53123 21 5   ADL/Self Care 98692 10 1   Orthotic Management [id number]     Neuro Re-Ed 99702     Non-Billable Time 5 ---       Casandra Brand, OTVENKATA/L  License Number: [id number]

## 2022-06-24 NOTE — PLAN OF CARE
Problem: Safety - Adult  Goal: Free from fall injury  Outcome: Progressing  Flowsheets (Taken 6/23/2022 0936 by Sofía Mead RN)  Free From Fall Injury:   Instruct family/caregiver on patient safety   Based on caregiver fall risk screen, instruct family/caregiver to ask for assistance with transferring infant if caregiver noted to have fall risk factors     Problem: Discharge Planning  Goal: Discharge to home or other facility with appropriate resources  Recent Flowsheet Documentation  Taken 6/23/2022 2015 by Diego Monique  Discharge to home or other facility with appropriate resources: Identify barriers to discharge with patient and caregiver  Taken 6/23/2022 0936 by Sofía Mead RN  Discharge to home or other facility with appropriate resources:   Identify barriers to discharge with patient and caregiver   Arrange for needed discharge resources and transportation as appropriate   Identify discharge learning needs (meds, wound care, etc)   Arrange for interpreters to assist at discharge as needed   Refer to discharge planning if patient needs post-hospital services based on physician order or complex needs related to functional status, cognitive ability or social support system     Problem: Pain  Goal: Verbalizes/displays adequate comfort level or baseline comfort level  Recent Flowsheet Documentation  Taken 6/23/2022 2240 by Diego Monique  Verbalizes/displays adequate comfort level or baseline comfort level: Encourage patient to monitor pain and request assistance  Taken 6/23/2022 2015 by Diego Kayden  Verbalizes/displays adequate comfort level or baseline comfort level: Encourage patient to monitor pain and request assistance  Taken 6/23/2022 0936 by Sofía Mead RN  Verbalizes/displays adequate comfort level or baseline comfort level:   Encourage patient to monitor pain and request assistance   Assess pain using appropriate pain scale   Administer analgesics based on type and severity of pain and evaluate response   Implement non-pharmacological measures as appropriate and evaluate response   Consider cultural and social influences on pain and pain management   Notify Licensed Independent Practitioner if interventions unsuccessful or patient reports new pain

## 2022-06-24 NOTE — PROGRESS NOTES
Care coordination: Met with patient and daughter re: discharge planning. Patient will require supplemental oxygen at discharge. No DME pref. Referral made to [facility]. Order and testing documented. Patient lives at home with spouse but has family who is very involved in patient's care. Daughter at bedside is a respiratory therapist and can help assist. Patient also agreeable to [facility] for skilled nursing. Referral made to [facility] [address]. PCP is Dr. Fede Muro. The Plan for Transition of Care is related to the following treatment goals: HHC / Oxygen    The Patient  was provided with a choice of provider and agrees with the discharge plan. [x] Yes [] No    Freedom of choice list was provided with basic dialogue that supports the patient's individualized plan of care/goals, treatment preferences and shares the quality data associated with the providers.  [x] Yes [] No

## 2022-06-24 NOTE — PROGRESS NOTES
Pulse ox was 91 on room air at rest.  Ambulated patient on room air. Oxygen saturation was 85 percent on room air while ambulating. Oxygen applied 2 liters nasal canula. Recovery pulse ox was 93 percent on 2 liters of oxygen.

## 2022-06-24 NOTE — PROGRESS NOTES
IV and tele removed. Discharge papers and instructions given to patient, and son who is at bedside. Son to transport patient home, portable oxygen at bedside.

## 2022-06-24 NOTE — PROGRESS NOTES
[facility] Addendum    I have personally participated in the history, exam, medical decision making with Jaci Terrell NP on the date of service, I have personally reviewed objective data and I agree with past medical, family, and social history as well as assessment and plan unless otherwise noted. Objective  Physical Exam  Vitals: BP (!) 116/59   Pulse 79   Temp 98.4 °F (36.9 °C) (Oral)   Resp 18   Ht 5' 4\" (1.626 m)   Wt 170 lb (77.1 kg)   SpO2 95%   BMI 29.18 kg/m²   General: well-developed, well-nourished, no acute distress, cooperative  Skin: generally warm, dry, and intact, with normal color  HEENT: normocephalic, atraumatic, no gross abnormalities  Respiratory: clear to auscultation bilaterally without respiratory distress  Cardiovascular: regular rate and rhythm without murmur / rub / gallop  Abdominal: soft, nontender, nondistended, normoactive bowel sounds  Extremities: no obvious edema or deformity  Neurologic: awake, alert, no gross deficits  Psychiatric: normal affect, cooperative    Assessment / Plan  · Lesions of the lungs and pelvis, suspect malignancy  · Anemia of chronic dz  · Hx HPL, GERD, hypothyroid, depression    Pt is a 71F w PMH HPL and arthritis who was admitted 6/20 with abdominal pain. Originally seen in ED 5/31 for abd pain, CT revealed multiple lung masses and follow-up CTA 6/1 showed central cavitation of some of the lesions and pt scheduled for lung biopsy to be done 6/21. Was also referred to pain mgmt and had been sent for CT of back at an outside of facility; this was completed but pt had not met w pain mgmt again yet. Abd pain was unbearable and pt presented here. CT showed lung lesions but also pelvic lesions. Pt was admitted with intractable pain with consults to oncology and palliative care.  elevated but CEA and 15-3 were normal.  S/p lung biopsy 6/21, biopsy results pending. For outpatient PET scan.      , son, daughter present today.  Pt had a lot of pain at site of biopsy, Robaxin did help. Postobstructive pna seen on imaging discussed w Dr. Layla Henry 6/23 and it is our mutual opinion that this doesn't currently require antibiotics and ID consult is being deferred for this reason. Resp cx sent but no results currently. Otherwise as per hSirlene's note. Please see orders for further plan of care. Probable dc soon.     Electronically signed by Ana Murphy DO on 6/24/2022 at 9:04 AM

## 2022-06-26 LAB
CULTURE, RESPIRATORY: NORMAL
SMEAR, RESPIRATORY: NORMAL

## 2022-06-27 ENCOUNTER — TELEPHONE (OUTPATIENT)
Dept: PRIMARY CARE CLINIC | Age: 72
End: 2022-06-27

## 2022-06-27 RX ORDER — CITALOPRAM 20 MG/1
TABLET ORAL
Qty: 30 TABLET | Refills: 5 | Status: SHIPPED | OUTPATIENT
Start: 2022-06-27

## 2022-06-27 NOTE — TELEPHONE ENCOUNTER
[facility] Transitions Initial Follow Up Call    Outreach made within 2 business days of discharge: Yes    Patient: Ally Andrea Patient : 1950   MRN: [id number]  Reason for Admission: Intractable pain  Discharge Date: 22       Spoke with: Jennifer    Discharge department/facility: [facility] Interactive Patient Contact:  Was patient able to fill all prescriptions: Yes  Was patient instructed to bring all medications to the follow-up visit: Yes  Is patient taking all medications as directed in the discharge summary? Yes  Does patient understand their discharge instructions: Yes  Does patient have questions or concerns that need addressed prior to 7-14 day follow up office visit: no  Spoke with patient and her . Patient will follow-up with Pallative Care and other specialist at this time. Will call PCP when ready.     Scheduled appointment with PCP within 7-14 days    Follow Up  Future Appointments   Date Time Provider Lily Bethea   2022 10:00 AM SCHEDULE, [facility] PALLIATIVE CARE PROVIDER Shiv [address] [place] [address]

## 2022-06-27 NOTE — TELEPHONE ENCOUNTER
Pt  Ary Ariza called and relayed message from pt asking for another RX for citalopram (CELEXA) 20 MG tablet    States recent diagnosis has been hard for her and wanted help managing depression.

## 2022-06-29 ENCOUNTER — TELEPHONE (OUTPATIENT)
Dept: PALLATIVE CARE | Age: 72
End: 2022-06-29

## 2022-06-29 DIAGNOSIS — G89.3 PAIN DUE TO NEOPLASM: ICD-10-CM

## 2022-06-29 DIAGNOSIS — Z51.5 PALLIATIVE CARE BY SPECIALIST: Primary | ICD-10-CM

## 2022-06-29 RX ORDER — MORPHINE SULFATE 30 MG/1
30 TABLET, FILM COATED, EXTENDED RELEASE ORAL 2 TIMES DAILY
Qty: 14 TABLET | Refills: 0 | Status: ON HOLD
Start: 2022-06-29 | End: 2022-07-07

## 2022-06-29 NOTE — PROGRESS NOTES
Palliative Medicine  Progress Note    Trial MS Contin 30 mg BID for her cancer pain, continue oxy IR 20 mg Q 4 PRN for breakthrough pain. Plan to check in early next week to see if improved. Cindy STORY-CNP  Palliative Medicine    Note: This report was completed using computer[employer]icPlace voiced recognition software. Every effort has been made to ensure accuracy; however, inadvertent computerized transcription errors may be present.

## 2022-06-29 NOTE — TELEPHONE ENCOUNTER
Call from Dr. Lenore Palmer office regarding patient's pain management, spoke to Matt Erazo who states Dr. Karlos Colbert would like our team to evaluate patient for long acting medication. Called and spoke to [facility] daughter Ryan Hidalgo. Ryan Hidalgo states her mother is taking Oxy IR 20 mg about every 4-6 hours, at least 5 X/day. Having increased breakthrough pain with activity. Discussed with PACO Carrington CNP and new orders received. Prescription sent for MS Contin 30 mg every 12 hours. Ryan Hidalgo notified and will contact our team in 1 week to update. Instructed to start decreasing use of Oxy IR after 3 days. Jeremy Govea understanding.

## 2022-07-04 ENCOUNTER — APPOINTMENT (OUTPATIENT)
Dept: CT IMAGING | Age: 72
DRG: 189 | End: 2022-07-04
Payer: MEDICARE

## 2022-07-04 ENCOUNTER — APPOINTMENT (OUTPATIENT)
Dept: GENERAL RADIOLOGY | Age: 72
DRG: 189 | End: 2022-07-04
Payer: MEDICARE

## 2022-07-04 ENCOUNTER — HOSPITAL ENCOUNTER (INPATIENT)
Age: 72
LOS: 3 days | Discharge: HOSPICE | DRG: 189 | End: 2022-07-07
Attending: EMERGENCY MEDICINE | Admitting: STUDENT IN AN ORGANIZED HEALTH CARE EDUCATION/TRAINING PROGRAM
Payer: MEDICARE

## 2022-07-04 DIAGNOSIS — J96.01 ACUTE RESPIRATORY FAILURE WITH HYPOXIA (HCC): Primary | ICD-10-CM

## 2022-07-04 DIAGNOSIS — C34.90 MALIGNANT NEOPLASM OF LUNG, UNSPECIFIED LATERALITY, UNSPECIFIED PART OF LUNG (HCC): ICD-10-CM

## 2022-07-04 DIAGNOSIS — G89.3 PAIN DUE TO NEOPLASM: ICD-10-CM

## 2022-07-04 DIAGNOSIS — Z51.5 PALLIATIVE CARE BY SPECIALIST: ICD-10-CM

## 2022-07-04 DIAGNOSIS — Z51.5 HOSPICE CARE PATIENT: ICD-10-CM

## 2022-07-04 DIAGNOSIS — D64.9 ANEMIA, UNSPECIFIED TYPE: ICD-10-CM

## 2022-07-04 PROBLEM — R06.89 ACUTE RESPIRATORY INSUFFICIENCY: Status: ACTIVE | Noted: 2022-07-04

## 2022-07-04 LAB
ABO/RH: NORMAL
ALBUMIN SERPL-MCNC: 2.6 G/DL (ref 3.5–5.2)
ALP BLD-CCNC: 67 U/L (ref 35–104)
ALT SERPL-CCNC: 10 U/L (ref 0–32)
ANION GAP SERPL CALCULATED.3IONS-SCNC: 12 MMOL/L (ref 7–16)
ANTIBODY SCREEN: NORMAL
AST SERPL-CCNC: 21 U/L (ref 0–31)
BASOPHILS ABSOLUTE: 0.05 E9/L (ref 0–0.2)
BASOPHILS RELATIVE PERCENT: 0.4 % (ref 0–2)
BILIRUB SERPL-MCNC: 0.5 MG/DL (ref 0–1.2)
BILIRUBIN DIRECT: <0.2 MG/DL (ref 0–0.3)
BILIRUBIN, INDIRECT: ABNORMAL MG/DL (ref 0–1)
BLOOD BANK DISPENSE STATUS: NORMAL
BLOOD BANK PRODUCT CODE: NORMAL
BPU ID: NORMAL
BUN BLDV-MCNC: 10 MG/DL (ref 6–23)
CALCIUM SERPL-MCNC: 8.9 MG/DL (ref 8.6–10.2)
CHLORIDE BLD-SCNC: 96 MMOL/L (ref 98–107)
CO2: 27 MMOL/L (ref 22–29)
CREAT SERPL-MCNC: 0.5 MG/DL (ref 0.5–1)
DESCRIPTION BLOOD BANK: NORMAL
EKG ATRIAL RATE: 79 BPM
EKG P AXIS: 35 DEGREES
EKG P-R INTERVAL: 158 MS
EKG Q-T INTERVAL: 390 MS
EKG QRS DURATION: 90 MS
EKG QTC CALCULATION (BAZETT): 447 MS
EKG R AXIS: 41 DEGREES
EKG T AXIS: 52 DEGREES
EKG VENTRICULAR RATE: 79 BPM
EOSINOPHILS ABSOLUTE: 0.05 E9/L (ref 0.05–0.5)
EOSINOPHILS RELATIVE PERCENT: 0.4 % (ref 0–6)
GFR AFRICAN AMERICAN: >60
GFR NON-AFRICAN AMERICAN: >60 ML/MIN/1.73
GLUCOSE BLD-MCNC: 105 MG/DL (ref 74–99)
HCT VFR BLD CALC: 21.4 % (ref 34–48)
HCT VFR BLD CALC: 26.6 % (ref 34–48)
HEMOGLOBIN: 6.2 G/DL (ref 11.5–15.5)
HEMOGLOBIN: 8.1 G/DL (ref 11.5–15.5)
IMMATURE GRANULOCYTES #: 0.08 E9/L
IMMATURE GRANULOCYTES %: 0.6 % (ref 0–5)
LYMPHOCYTES ABSOLUTE: 1.48 E9/L (ref 1.5–4)
LYMPHOCYTES RELATIVE PERCENT: 12 % (ref 20–42)
MAGNESIUM: 2.2 MG/DL (ref 1.6–2.6)
MCH RBC QN AUTO: 26.4 PG (ref 26–35)
MCHC RBC AUTO-ENTMCNC: 29 % (ref 32–34.5)
MCV RBC AUTO: 91.1 FL (ref 80–99.9)
MONOCYTES ABSOLUTE: 0.94 E9/L (ref 0.1–0.95)
MONOCYTES RELATIVE PERCENT: 7.6 % (ref 2–12)
NEUTROPHILS ABSOLUTE: 9.71 E9/L (ref 1.8–7.3)
NEUTROPHILS RELATIVE PERCENT: 79 % (ref 43–80)
PDW BLD-RTO: 18.8 FL (ref 11.5–15)
PLATELET # BLD: 505 E9/L (ref 130–450)
PMV BLD AUTO: 9.3 FL (ref 7–12)
POTASSIUM SERPL-SCNC: 3.9 MMOL/L (ref 3.5–5)
PRO-BNP: 695 PG/ML (ref 0–125)
PROCALCITONIN: 0.12 NG/ML (ref 0–0.08)
RBC # BLD: 2.35 E12/L (ref 3.5–5.5)
SARS-COV-2, NAAT: NOT DETECTED
SODIUM BLD-SCNC: 135 MMOL/L (ref 132–146)
TOTAL PROTEIN: 6.4 G/DL (ref 6.4–8.3)
TROPONIN, HIGH SENSITIVITY: 16 NG/L (ref 0–9)
TROPONIN, HIGH SENSITIVITY: 17 NG/L (ref 0–9)
WBC # BLD: 12.3 E9/L (ref 4.5–11.5)

## 2022-07-04 PROCEDURE — 87040 BLOOD CULTURE FOR BACTERIA: CPT

## 2022-07-04 PROCEDURE — 2580000003 HC RX 258: Performed by: EMERGENCY MEDICINE

## 2022-07-04 PROCEDURE — 71275 CT ANGIOGRAPHY CHEST: CPT

## 2022-07-04 PROCEDURE — APPSS45 APP SPLIT SHARED TIME 31-45 MINUTES: Performed by: NURSE PRACTITIONER

## 2022-07-04 PROCEDURE — 36415 COLL VENOUS BLD VENIPUNCTURE: CPT

## 2022-07-04 PROCEDURE — 99285 EMERGENCY DEPT VISIT HI MDM: CPT

## 2022-07-04 PROCEDURE — 83880 ASSAY OF NATRIURETIC PEPTIDE: CPT

## 2022-07-04 PROCEDURE — 6360000002 HC RX W HCPCS: Performed by: EMERGENCY MEDICINE

## 2022-07-04 PROCEDURE — 85014 HEMATOCRIT: CPT

## 2022-07-04 PROCEDURE — 86923 COMPATIBILITY TEST ELECTRIC: CPT

## 2022-07-04 PROCEDURE — 80076 HEPATIC FUNCTION PANEL: CPT

## 2022-07-04 PROCEDURE — 86900 BLOOD TYPING SEROLOGIC ABO: CPT

## 2022-07-04 PROCEDURE — 93005 ELECTROCARDIOGRAM TRACING: CPT | Performed by: EMERGENCY MEDICINE

## 2022-07-04 PROCEDURE — 85018 HEMOGLOBIN: CPT

## 2022-07-04 PROCEDURE — 86850 RBC ANTIBODY SCREEN: CPT

## 2022-07-04 PROCEDURE — 96374 THER/PROPH/DIAG INJ IV PUSH: CPT

## 2022-07-04 PROCEDURE — 71045 X-RAY EXAM CHEST 1 VIEW: CPT

## 2022-07-04 PROCEDURE — 1200000000 HC SEMI PRIVATE

## 2022-07-04 PROCEDURE — 6360000004 HC RX CONTRAST MEDICATION: Performed by: RADIOLOGY

## 2022-07-04 PROCEDURE — 83735 ASSAY OF MAGNESIUM: CPT

## 2022-07-04 PROCEDURE — P9016 RBC LEUKOCYTES REDUCED: HCPCS

## 2022-07-04 PROCEDURE — 87635 SARS-COV-2 COVID-19 AMP PRB: CPT

## 2022-07-04 PROCEDURE — 80048 BASIC METABOLIC PNL TOTAL CA: CPT

## 2022-07-04 PROCEDURE — 99223 1ST HOSP IP/OBS HIGH 75: CPT | Performed by: STUDENT IN AN ORGANIZED HEALTH CARE EDUCATION/TRAINING PROGRAM

## 2022-07-04 PROCEDURE — 86901 BLOOD TYPING SEROLOGIC RH(D): CPT

## 2022-07-04 PROCEDURE — 85025 COMPLETE CBC W/AUTO DIFF WBC: CPT

## 2022-07-04 PROCEDURE — 84484 ASSAY OF TROPONIN QUANT: CPT

## 2022-07-04 PROCEDURE — 6370000000 HC RX 637 (ALT 250 FOR IP): Performed by: NURSE PRACTITIONER

## 2022-07-04 PROCEDURE — 84145 PROCALCITONIN (PCT): CPT

## 2022-07-04 RX ORDER — MAGNESIUM SULFATE IN WATER 40 MG/ML
2000 INJECTION, SOLUTION INTRAVENOUS PRN
Status: DISCONTINUED | OUTPATIENT
Start: 2022-07-04 | End: 2022-07-07 | Stop reason: HOSPADM

## 2022-07-04 RX ORDER — M-VIT,TX,IRON,MINS/CALC/FOLIC 27MG-0.4MG
1 TABLET ORAL DAILY
Status: DISCONTINUED | OUTPATIENT
Start: 2022-07-04 | End: 2022-07-07 | Stop reason: HOSPADM

## 2022-07-04 RX ORDER — POTASSIUM CHLORIDE 7.45 MG/ML
10 INJECTION INTRAVENOUS PRN
Status: DISCONTINUED | OUTPATIENT
Start: 2022-07-04 | End: 2022-07-07 | Stop reason: HOSPADM

## 2022-07-04 RX ORDER — SODIUM CHLORIDE 0.9 % (FLUSH) 0.9 %
5-40 SYRINGE (ML) INJECTION PRN
Status: DISCONTINUED | OUTPATIENT
Start: 2022-07-04 | End: 2022-07-07 | Stop reason: HOSPADM

## 2022-07-04 RX ORDER — ONDANSETRON 4 MG/1
4 TABLET, ORALLY DISINTEGRATING ORAL EVERY 8 HOURS PRN
Status: DISCONTINUED | OUTPATIENT
Start: 2022-07-04 | End: 2022-07-06

## 2022-07-04 RX ORDER — SODIUM CHLORIDE 9 MG/ML
INJECTION, SOLUTION INTRAVENOUS PRN
Status: DISCONTINUED | OUTPATIENT
Start: 2022-07-04 | End: 2022-07-07 | Stop reason: HOSPADM

## 2022-07-04 RX ORDER — CITALOPRAM 20 MG/1
20 TABLET ORAL DAILY
Status: DISCONTINUED | OUTPATIENT
Start: 2022-07-04 | End: 2022-07-07 | Stop reason: HOSPADM

## 2022-07-04 RX ORDER — SODIUM CHLORIDE 0.9 % (FLUSH) 0.9 %
5-40 SYRINGE (ML) INJECTION EVERY 12 HOURS SCHEDULED
Status: DISCONTINUED | OUTPATIENT
Start: 2022-07-04 | End: 2022-07-07 | Stop reason: HOSPADM

## 2022-07-04 RX ORDER — MORPHINE SULFATE 30 MG/1
30 TABLET, FILM COATED, EXTENDED RELEASE ORAL 2 TIMES DAILY
Status: DISCONTINUED | OUTPATIENT
Start: 2022-07-04 | End: 2022-07-07 | Stop reason: HOSPADM

## 2022-07-04 RX ORDER — LACTULOSE 10 G/15ML
30 SOLUTION ORAL DAILY
Status: DISCONTINUED | OUTPATIENT
Start: 2022-07-04 | End: 2022-07-07 | Stop reason: HOSPADM

## 2022-07-04 RX ORDER — LEVOTHYROXINE SODIUM 0.03 MG/1
25 TABLET ORAL DAILY
Status: DISCONTINUED | OUTPATIENT
Start: 2022-07-05 | End: 2022-07-07 | Stop reason: HOSPADM

## 2022-07-04 RX ORDER — PANTOPRAZOLE SODIUM 40 MG/1
40 TABLET, DELAYED RELEASE ORAL
Status: DISCONTINUED | OUTPATIENT
Start: 2022-07-05 | End: 2022-07-06

## 2022-07-04 RX ORDER — SODIUM CHLORIDE 0.9 % (FLUSH) 0.9 %
10 SYRINGE (ML) INJECTION PRN
Status: DISCONTINUED | OUTPATIENT
Start: 2022-07-04 | End: 2022-07-04 | Stop reason: SDUPTHER

## 2022-07-04 RX ORDER — OXYCODONE HYDROCHLORIDE 5 MG/1
20 TABLET ORAL EVERY 4 HOURS PRN
Status: DISCONTINUED | OUTPATIENT
Start: 2022-07-04 | End: 2022-07-07 | Stop reason: HOSPADM

## 2022-07-04 RX ORDER — ACETAMINOPHEN 650 MG/1
650 SUPPOSITORY RECTAL EVERY 6 HOURS PRN
Status: DISCONTINUED | OUTPATIENT
Start: 2022-07-04 | End: 2022-07-07 | Stop reason: HOSPADM

## 2022-07-04 RX ORDER — MORPHINE SULFATE 4 MG/ML
4 INJECTION, SOLUTION INTRAMUSCULAR; INTRAVENOUS ONCE
Status: COMPLETED | OUTPATIENT
Start: 2022-07-05 | End: 2022-07-05

## 2022-07-04 RX ORDER — VITAMIN B COMPLEX
5000 TABLET ORAL DAILY
Status: DISCONTINUED | OUTPATIENT
Start: 2022-07-04 | End: 2022-07-07 | Stop reason: HOSPADM

## 2022-07-04 RX ORDER — BENZONATATE 100 MG/1
100 CAPSULE ORAL DAILY
Status: DISCONTINUED | OUTPATIENT
Start: 2022-07-04 | End: 2022-07-06

## 2022-07-04 RX ORDER — SENNA AND DOCUSATE SODIUM 50; 8.6 MG/1; MG/1
2 TABLET, FILM COATED ORAL 2 TIMES DAILY
Status: DISCONTINUED | OUTPATIENT
Start: 2022-07-04 | End: 2022-07-07 | Stop reason: HOSPADM

## 2022-07-04 RX ORDER — FENTANYL CITRATE 50 UG/ML
50 INJECTION, SOLUTION INTRAMUSCULAR; INTRAVENOUS ONCE
Status: COMPLETED | OUTPATIENT
Start: 2022-07-04 | End: 2022-07-04

## 2022-07-04 RX ORDER — LEVOTHYROXINE SODIUM 0.03 MG/1
25 TABLET ORAL DAILY
Status: DISCONTINUED | OUTPATIENT
Start: 2022-07-04 | End: 2022-07-04

## 2022-07-04 RX ORDER — POLYETHYLENE GLYCOL 3350 17 G/17G
17 POWDER, FOR SOLUTION ORAL DAILY PRN
Status: DISCONTINUED | OUTPATIENT
Start: 2022-07-04 | End: 2022-07-07 | Stop reason: HOSPADM

## 2022-07-04 RX ORDER — POTASSIUM CHLORIDE 20 MEQ/1
40 TABLET, EXTENDED RELEASE ORAL PRN
Status: DISCONTINUED | OUTPATIENT
Start: 2022-07-04 | End: 2022-07-07 | Stop reason: HOSPADM

## 2022-07-04 RX ORDER — ACETAMINOPHEN 325 MG/1
650 TABLET ORAL EVERY 6 HOURS PRN
Status: DISCONTINUED | OUTPATIENT
Start: 2022-07-04 | End: 2022-07-07 | Stop reason: HOSPADM

## 2022-07-04 RX ADMIN — DOCUSATE SODIUM 50 MG AND SENNOSIDES 8.6 MG 2 TABLET: 8.6; 5 TABLET, FILM COATED ORAL at 20:24

## 2022-07-04 RX ADMIN — VANCOMYCIN HYDROCHLORIDE 1500 MG: 10 INJECTION, POWDER, LYOPHILIZED, FOR SOLUTION INTRAVENOUS at 18:21

## 2022-07-04 RX ADMIN — ONDANSETRON 4 MG: 4 TABLET, ORALLY DISINTEGRATING ORAL at 22:38

## 2022-07-04 RX ADMIN — OXYCODONE 20 MG: 5 TABLET ORAL at 18:22

## 2022-07-04 RX ADMIN — MORPHINE SULFATE 30 MG: 30 TABLET, FILM COATED, EXTENDED RELEASE ORAL at 20:24

## 2022-07-04 RX ADMIN — FENTANYL CITRATE 50 MCG: 50 INJECTION, SOLUTION INTRAMUSCULAR; INTRAVENOUS at 13:36

## 2022-07-04 RX ADMIN — IOPAMIDOL 75 ML: 755 INJECTION, SOLUTION INTRAVENOUS at 13:05

## 2022-07-04 RX ADMIN — OXYCODONE 20 MG: 5 TABLET ORAL at 22:38

## 2022-07-04 RX ADMIN — CEFEPIME HYDROCHLORIDE 2000 MG: 2 INJECTION, POWDER, FOR SOLUTION INTRAVENOUS at 16:50

## 2022-07-04 ASSESSMENT — ENCOUNTER SYMPTOMS
EYE REDNESS: 0
EYE ITCHING: 0
DIARRHEA: 0
SHORTNESS OF BREATH: 1
RHINORRHEA: 0
WHEEZING: 0
COUGH: 1
ABDOMINAL PAIN: 0

## 2022-07-04 ASSESSMENT — PAIN DESCRIPTION - ORIENTATION: ORIENTATION: RIGHT;LEFT

## 2022-07-04 ASSESSMENT — PAIN SCALES - GENERAL
PAINLEVEL_OUTOF10: 9
PAINLEVEL_OUTOF10: 5
PAINLEVEL_OUTOF10: 7
PAINLEVEL_OUTOF10: 10

## 2022-07-04 ASSESSMENT — PAIN DESCRIPTION - ONSET: ONSET: ON-GOING

## 2022-07-04 ASSESSMENT — PAIN DESCRIPTION - DESCRIPTORS: DESCRIPTORS: SPASM

## 2022-07-04 ASSESSMENT — PAIN DESCRIPTION - PAIN TYPE: TYPE: CHRONIC PAIN

## 2022-07-04 ASSESSMENT — PAIN - FUNCTIONAL ASSESSMENT
PAIN_FUNCTIONAL_ASSESSMENT: PREVENTS OR INTERFERES SOME ACTIVE ACTIVITIES AND ADLS
PAIN_FUNCTIONAL_ASSESSMENT: NONE - DENIES PAIN

## 2022-07-04 ASSESSMENT — PAIN DESCRIPTION - FREQUENCY: FREQUENCY: CONTINUOUS

## 2022-07-04 ASSESSMENT — PAIN DESCRIPTION - LOCATION: LOCATION: CHEST

## 2022-07-04 NOTE — ED PROVIDER NOTES
Yenny Bedoya is a 66 y.o. female    Chief Complaint   Patient presents with    Shortness of Breath     uses home O2, has lung CA    Hemoptysis         HPI   Yenny Bedoya is a 66 y.o. female presenting to the ED for Shortness of Breath (uses home O2, has lung CA) and Hemoptysis    History comes primarily from the patient, Family and EMS. Patient presents to the emergency room for shortness of breath, hypoxia, hemoptysis going on in progressively worsening especially for the last week. Patient wears 2 and half liters oxygen at baseline at home. Patient was diagnosed with cancer approximately 2 to 3 months ago with nodules noted to be in the lungs and in the abdomen/bowel. Patient recently had a further diagnostic work-up done for the cancer type which has not returned as of yet according to the daughter who accompanies the patient. The patient does not have other complaints at this time. Patient was most recently admitted to the hospital about 2 weeks ago due to GI bleeding and did require a blood transfusion at that time. Patient's daughter stated that even after the transfusion patient's blood count improves. Review of Systems   Constitutional: Negative for appetite change, fatigue and fever. HENT: Negative for congestion and rhinorrhea. Eyes: Negative for redness and itching. Respiratory: Positive for cough (hemoptysis) and shortness of breath. Negative for wheezing. Cardiovascular: Negative for chest pain and palpitations. Gastrointestinal: Negative for abdominal pain and diarrhea. Genitourinary: Negative for difficulty urinating, frequency and urgency. Musculoskeletal: Negative for arthralgias and myalgias. Skin: Negative for pallor and rash. Neurological: Negative for dizziness and numbness. Psychiatric/Behavioral: Negative for agitation and confusion. All other systems reviewed and are negative. Physical Exam  Vitals and nursing note reviewed.    Constitutional: General: She is not in acute distress. Appearance: Normal appearance. She is not ill-appearing. HENT:      Head: Normocephalic and atraumatic. Right Ear: External ear normal.      Left Ear: External ear normal.      Nose: Nose normal. No rhinorrhea. Mouth/Throat:      Mouth: Mucous membranes are moist.      Pharynx: Oropharynx is clear. Eyes:      Extraocular Movements: Extraocular movements intact. Conjunctiva/sclera: Conjunctivae normal.      Pupils: Pupils are equal, round, and reactive to light. Cardiovascular:      Rate and Rhythm: Normal rate and regular rhythm. Pulses: Normal pulses. Heart sounds: Normal heart sounds. No murmur heard. Pulmonary:      Effort: Pulmonary effort is normal. No respiratory distress. Breath sounds: Normal breath sounds. No wheezing. Comments: On nasal cannula, requiring 5L instead of baseline 2.5L of O2 to maintain saturation above 90%. Abdominal:      General: Bowel sounds are normal. There is no distension. Palpations: Abdomen is soft. Tenderness: There is no abdominal tenderness. Musculoskeletal:         General: No swelling or deformity. Normal range of motion. Cervical back: Normal range of motion and neck supple. No rigidity. Skin:     General: Skin is warm and dry. Coloration: Skin is not jaundiced or pale. Neurological:      General: No focal deficit present. Mental Status: She is alert and oriented to person, place, and time. Mental status is at baseline. Motor: No weakness. Psychiatric:         Mood and Affect: Mood normal.         Behavior: Behavior normal.          Procedures     MDM   Patient presented to the Emergency Department for Shortness of Breath (uses home O2, has lung CA) and Hemoptysis    Patient presents with an extensive recent history including and intra-abdominal cancer which is recently been biopsied but without results as yet.   Patient presents today hypoxic CBC with Auto Differential   Result Value Ref Range    WBC 12.3 (H) 4.5 - 11.5 E9/L    RBC 2.35 (L) 3.50 - 5.50 E12/L    Hemoglobin 6.2 (LL) 11.5 - 15.5 g/dL    Hematocrit 21.4 (L) 34.0 - 48.0 %    MCV 91.1 80.0 - 99.9 fL    MCH 26.4 26.0 - 35.0 pg    MCHC 29.0 (L) 32.0 - 34.5 %    RDW 18.8 (H) 11.5 - 15.0 fL    Platelets 780 (H) 728 - 450 E9/L    MPV 9.3 7.0 - 12.0 fL    Neutrophils % 79.0 43.0 - 80.0 %    Immature Granulocytes % 0.6 0.0 - 5.0 %    Lymphocytes % 12.0 (L) 20.0 - 42.0 %    Monocytes % 7.6 2.0 - 12.0 %    Eosinophils % 0.4 0.0 - 6.0 %    Basophils % 0.4 0.0 - 2.0 %    Neutrophils Absolute 9.71 (H) 1.80 - 7.30 E9/L    Immature Granulocytes # 0.08 E9/L    Lymphocytes Absolute 1.48 (L) 1.50 - 4.00 E9/L    Monocytes Absolute 0.94 0.10 - 0.95 E9/L    Eosinophils Absolute 0.05 0.05 - 0.50 E9/L    Basophils Absolute 0.05 0.00 - 0.20 R5/K   Basic Metabolic Panel   Result Value Ref Range    Sodium 135 132 - 146 mmol/L    Potassium 3.9 3.5 - 5.0 mmol/L    Chloride 96 (L) 98 - 107 mmol/L    CO2 27 22 - 29 mmol/L    Anion Gap 12 7 - 16 mmol/L    Glucose 105 (H) 74 - 99 mg/dL    BUN 10 6 - 23 mg/dL    CREATININE 0.5 0.5 - 1.0 mg/dL    GFR Non-African American >60 >=60 mL/min/1.73    GFR African American >60     Calcium 8.9 8.6 - 10.2 mg/dL   Hepatic Function Panel   Result Value Ref Range    Total Protein 6.4 6.4 - 8.3 g/dL    Albumin 2.6 (L) 3.5 - 5.2 g/dL    Alkaline Phosphatase 67 35 - 104 U/L    ALT 10 0 - 32 U/L    AST 21 0 - 31 U/L    Total Bilirubin 0.5 0.0 - 1.2 mg/dL    Bilirubin, Direct <0.2 0.0 - 0.3 mg/dL    Bilirubin, Indirect see below 0.0 - 1.0 mg/dL   Troponin   Result Value Ref Range    Troponin, High Sensitivity 16 (H) 0 - 9 ng/L   Brain Natriuretic Peptide   Result Value Ref Range    Pro- (H) 0 - 125 pg/mL   Magnesium   Result Value Ref Range    Magnesium 2.2 1.6 - 2.6 mg/dL   Troponin   Result Value Ref Range    Troponin, High Sensitivity 17 (H) 0 - 9 ng/L   EKG 12 Lead   Result Value Ref Range    Ventricular Rate 79 BPM    Atrial Rate 79 BPM    P-R Interval 158 ms    QRS Duration 90 ms    Q-T Interval 390 ms    QTc Calculation (Bazett) 447 ms    P Axis 35 degrees    R Axis 41 degrees    T Axis 52 degrees   TYPE AND SCREEN   Result Value Ref Range    ABO/Rh A POS     Antibody Screen NEG    PREPARE RBC (CROSSMATCH), 1 Units   Result Value Ref Range    Product Code Blood Bank [id number]     Description Blood Bank Red Blood Cells, Leuko-reduced     Unit Number [id number]     Dispense Status Blood Bank issued        RADIOLOGY:  CTA PULMONARY W CONTRAST   Final Result   No central pulmonary embolism or aortic dissection. Extensive patchy and ground-glass infiltrates and pleural effusions   concerning for diffuse pneumonia and or edema. Multiple bilateral pulmonary masses and nodules as noted some of which are   necrotic concerning for malignancy. Superimposed complicated pneumonia is   also consideration. Please correlate with the pathology report from tissue   sampling. XR CHEST PORTABLE   Final Result   Worsening airspace consolidation on the right with confluence now present. Left lung mass with likely adjacent infiltrate and or atelectasis.               ------------------------- NURSING NOTES AND VITALS REVIEWED ---------------------------  Date / Time Roomed:  7/4/2022 10:31 AM  ED Bed Assignment:  03/03    The nursing notes within the ED encounter and vital signs as below have been reviewed.      Patient Vitals for the past 24 hrs:   BP Temp Pulse Resp SpO2 Height Weight   07/04/22 1404 (!) 111/53 98.6 °F (37 °C) 72 18 95 % -- --   07/04/22 1339 113/66 98.6 °F (37 °C) 82 20 91 % -- --   07/04/22 1040 -- -- -- -- 93 % -- --   07/04/22 1035 (!) 110/58 98.2 °F (36.8 °C) 72 20 (!) 88 % 5' 4\" (1.626 m) 165 lb (74.8 kg)       Oxygen Saturation Interpretation: Normal    ------------------------------------------ PROGRESS NOTES ------------------------------------------  Re-evaluation(s):  Time: Multiple. Patients symptoms show no change  Repeat physical examination is not changed    Counseling:  I have spoken with the patient and Daughter and discussed todays results, in addition to providing specific details for the plan of care and counseling regarding the diagnosis and prognosis. Their questions are answered at this time and they are agreeable with the plan of admission.    --------------------------------- ADDITIONAL PROVIDER NOTES ---------------------------------  Consultations:  Time: 1515. Spoke with Dr. Kaylah Bruno. Discussed case. They will admit the patient. This patient's ED course included: a personal history and physicial examination, multiple bedside re-evaluations, IV medications, cardiac monitoring and continuous pulse oximetry    This patient has remained hemodynamically stable during their ED course. Diagnosis:  1. Acute respiratory failure with hypoxia ([facility]    2. Malignant neoplasm of lung, unspecified laterality, unspecified part of lung ([facility]        Disposition:  Patient's disposition: Admit to telemetry  Patient's condition is poor in the long-term but stable for now.            Ramiro Rm MD  Resident  07/04/22 8342

## 2022-07-04 NOTE — H&P
[facility]   History and Physical      CHIEF COMPLAINT:   SOB, hemoptysis    History of Present Illness:  66 y.o. female who presents from home with complaints of hemoptysis that started 4-5 days ago and got worse overnight. She also endorses anorexia past couple days and worsening shortness of breath. She was recently discharged on home oxygen a 2 liters NC. She was baseline prior to that. Today she is requiring 5 liters NC to keep sats >92%. She reports moist productive cough and states overnight she coughed up two large blood clots. She did see her oncologist last week and was told she had stage IV lung cancer but her pathology had not been resulted yet. Patient recently discharged from here on 06/24 after being admitted for new lung mass/carcinoma and post obstructive pneumonia. Has not followed up with oncology since discharge. Did receive outpatient iron infusions at [facility] with Dr. Griffin Baez) for H&P: patient      REVIEW OF SYSTEMS:  Denies CP,  subjective fever/chills, congestion, n/v/d, ha, vision/hearing changes, wt changes, hot/cold flashes, other open skin lesions, constipation, dysuria/hematuria unless noted in HPI. Complete ROS performed with the patient and is otherwise negative.       PMH:  Past Medical History:   Diagnosis Date    Arthritis     Cancer ([address] 06/24/2022    lung    Hyperlipidemia     Right shoulder pain        Surgical History:  Past Surgical History:   Procedure Laterality Date    COLONOSCOPY      CT NEEDLE BIOPSY LUNG PERCUTANEOUS  06/21/2022    CT NEEDLE BIOPSY LUNG PERCUTANEOUS 6/21/2022 [facility] CT    EYE SURGERY Bilateral     Cataracts    HYSTERECTOMY (CERVIX STATUS UNKNOWN)      2017    SHOULDER ARTHROSCOPY Right 09/05/2019    RIGHT SHOULDER ARTHROSCOPY ROTATOR CUFF REPAIR POSSIBLE BICEPS TENODESIS performed by Luiza Pandey MD at [address] [place] [address] ARTHROSCOPY Left 05/21/2020    LEFT SHOULDER ARTHROSCOPY, ROTATOR CUFF REPAIR BICEPS TENODESIS (ARTHREX, INTERSCALENE BLOCK) performed by Sadiq Blackburn MD at [address] URETEROSCOPY Left 09/14/2015       Medications Prior to Admission:    Prior to Admission medications    Medication Sig Start Date End Date Taking? Authorizing Provider   morphine (MS CONTIN) 30 MG extended release tablet Take 1 tablet by mouth 2 times daily for 7 days. 6/29/22 7/6/22  PORSHA Wilkes CNP   citalopram (CELEXA) 20 MG tablet Takes 20 mg daily 6/27/22   Melania Buchanan DO   sennosides-docusate sodium (SENOKOT-S) 8.6-50 MG tablet Take 2 tablets by mouth 2 times daily 6/24/22 7/24/22  MICHELLE Dobbins   ondansetron (ZOFRAN-ODT) 4 MG disintegrating tablet Take 1 tablet by mouth every 8 hours as needed for Nausea or Vomiting 6/24/22 7/24/22  MICHELLE Dobbins   oxyCODONE (ROXICODONE) 20 MG immediate release tablet Take 1 tablet by mouth every 4 hours as needed for Pain for up to 30 days.  6/24/22 7/24/22  PORSHA Sultana CNP   cyclobenzaprine (FLEXERIL) 5 MG tablet Take 1 tablet by mouth 3 times daily as needed for Muscle spasms 6/24/22 7/24/22  PORSHA Sultana - CNP   lactulose (CHRONULAC) 10 GM/15ML solution Take 30 mLs by mouth daily 6/10/22   Historical Provider, MD   benzonatate (TESSALON) 100 MG capsule Take 1 capsule by mouth daily 6/10/22   Historical Provider, MD   levothyroxine (SYNTHROID) 25 MCG tablet Take 1 tablet by mouth Daily 5/25/22   Melania Buchanan DO   omeprazole (PRILOSEC) 20 MG delayed release capsule Take 20 mg by mouth daily    Historical Provider, MD   Cholecalciferol (VITAMIN D3) 2000 UNITS CAPS Take 5,000 Units by mouth daily     Historical Provider, MD   Multiple Vitamins-Minerals (MULTIVITAMIN & MINERAL PO) Take 1 tablet by mouth daily    Historical Provider, MD   calcium carbonate 600 MG TABS tablet Take 1 tablet by mouth daily  Patient not taking: Reported on 6/20/2022    Historical Provider, MD   Omega-3 Fatty Acids (FISH OIL) 1000 MG CAPS Take 2,000 mg by mouth 2 times daily   Patient not taking: Reported on 6/20/2022    Historical Provider, MD       Allergies:    Atorvastatin, Avelox [moxifloxacin], Codeine, Daypro [oxaprozin], Demerol hcl [meperidine], Levaquin [levofloxacin in d5w], Tape [adhesive tape], and Valium [diazepam]    Social History:    reports that she has never smoked. She has never used smokeless tobacco. She reports that she does not drink alcohol and does not use drugs. Family History:   family history includes Breast Cancer (age of onset: 48) in her niece; Breast Cancer (age of onset: 77) in her sister. PHYSICAL EXAM:  Vitals:  BP (!) 111/53   Pulse 72   Temp 98.6 °F (37 °C)   Resp 18   Ht 5' 4\" (1.626 m)   Wt 165 lb (74.8 kg)   SpO2 95%   BMI 28.32 kg/m²     General Appearance: alert and oriented to person, place and time and in no acute distress  Skin: warm and dry  Head: normocephalic and atraumatic  Eyes: pupils equal, round, and reactive to light, extraocular eye movements intact, conjunctivae normal  Neck: neck supple and non tender without mass   Pulmonary/Chest: coarse crackles bilaterally, normal air movement, no respiratory distress  Cardiovascular: normal rate, normal S1 and S2 and no carotid bruits  Abdomen: soft, non-tender, non-distended, normal bowel sounds, no masses or organomegaly  Extremities: no cyanosis, no clubbing and no edema  Neurologic: no cranial nerve deficit and speech normal    LABS:  Recent Labs     07/04/22  1056      K 3.9   CL 96*   CO2 27   BUN 10   CREATININE 0.5   GLUCOSE 105*   CALCIUM 8.9       Recent Labs     07/04/22  1056   WBC 12.3*   RBC 2.35*   HGB 6.2*   HCT 21.4*   MCV 91.1   MCH 26.4   MCHC 29.0*   RDW 18.8*   *   MPV 9.3       No results for input(s): POCGLU in the last 72 hours.         I reviewed all labs and diagnostic images        Radiology: XR CHEST PORTABLE    Result Date: 7/4/2022  EXAMINATION: ONE XRAY VIEW OF THE CHEST 7/4/2022 9:42 am COMPARISON: Chest CT scan 21 June 2022 HISTORY: ORDERING SYSTEM PROVIDED HISTORY: shortness of breath TECHNOLOGIST PROVIDED HISTORY: Reason for exam:->shortness of breath FINDINGS: Notably worsening airspace consolidation in the right lung which is now confluent. A left lower lobe lung mass is redemonstrated and the borders are rather ill-defined suggesting that there is adjacent infiltrate and or atelectasis. Worsening airspace consolidation on the right with confluence now present. Left lung mass with likely adjacent infiltrate and or atelectasis. CTA PULMONARY W CONTRAST    Result Date: 7/4/2022  EXAMINATION: CTA OF THE CHEST 7/4/2022 1:06 pm TECHNIQUE: CTA of the chest was performed after the administration of intravenous contrast.  Multiplanar reformatted images are provided for review. MIP images are provided for review. Automated exposure control, iterative reconstruction, and/or weight based adjustment of the mA/kV was utilized to reduce the radiation dose to as low as reasonably achievable. COMPARISON: 06/21/2022 HISTORY: ORDERING SYSTEM PROVIDED HISTORY: shortness of breath TECHNOLOGIST PROVIDED HISTORY: Reason for exam:->shortness of breath Decision Support Exception - unselect if not a suspected or confirmed emergency medical condition->Emergency Medical Condition (MA) FINDINGS: There is borderline cardiac size. The great vessels are normal.  Moderately enlarged mediastinal and hilar lymph nodes are present with lymph nodes measuring up to 1.6 cm in the AP window, paratracheal region, subcarinal region and pulmonary hilum. There are no filling defects in the main pulmonary artery and the central branches. There is diffuse multifocal patchy and ground-glass infiltrates bilaterally with moderate pleural effusions.   The previously noted bilateral pulmonary masses are identified with the larger 1 in the left lower lobe measuring levothyroxine    6.  Opiate induced constipation  -resume lactulose    Code Status:  DNRCCA  DVT prophylaxis: SCD         Electronically signed by PORSHA Beauchamp CNP on 7/4/2022 at 4:35 PM

## 2022-07-04 NOTE — Clinical Note
Discharge Plan[de-identified] Other/[facility])   Telemetry/Cardiac Monitoring Required?: Yes

## 2022-07-05 LAB
ADENOVIRUS BY PCR: NOT DETECTED
ALBUMIN SERPL-MCNC: 2.3 G/DL (ref 3.5–5.2)
ALP BLD-CCNC: 64 U/L (ref 35–104)
ALT SERPL-CCNC: 13 U/L (ref 0–32)
ANION GAP SERPL CALCULATED.3IONS-SCNC: 11 MMOL/L (ref 7–16)
AST SERPL-CCNC: 23 U/L (ref 0–31)
BASOPHILS ABSOLUTE: 0.06 E9/L (ref 0–0.2)
BASOPHILS RELATIVE PERCENT: 0.5 % (ref 0–2)
BILIRUB SERPL-MCNC: 0.6 MG/DL (ref 0–1.2)
BORDETELLA PARAPERTUSSIS BY PCR: NOT DETECTED
BORDETELLA PERTUSSIS BY PCR: NOT DETECTED
BUN BLDV-MCNC: 10 MG/DL (ref 6–23)
C-REACTIVE PROTEIN: 19.5 MG/DL (ref 0–0.4)
CALCIUM SERPL-MCNC: 8.9 MG/DL (ref 8.6–10.2)
CHLAMYDOPHILIA PNEUMONIAE BY PCR: NOT DETECTED
CHLORIDE BLD-SCNC: 98 MMOL/L (ref 98–107)
CO2: 26 MMOL/L (ref 22–29)
CORONAVIRUS 229E BY PCR: NOT DETECTED
CORONAVIRUS HKU1 BY PCR: NOT DETECTED
CORONAVIRUS NL63 BY PCR: NOT DETECTED
CORONAVIRUS OC43 BY PCR: NOT DETECTED
CREAT SERPL-MCNC: 0.5 MG/DL (ref 0.5–1)
EOSINOPHILS ABSOLUTE: 0.11 E9/L (ref 0.05–0.5)
EOSINOPHILS RELATIVE PERCENT: 0.8 % (ref 0–6)
GFR AFRICAN AMERICAN: >60
GFR NON-AFRICAN AMERICAN: >60 ML/MIN/1.73
GLUCOSE BLD-MCNC: 113 MG/DL (ref 74–99)
HCT VFR BLD CALC: 25 % (ref 34–48)
HCT VFR BLD CALC: 25.1 % (ref 34–48)
HEMOGLOBIN: 7.5 G/DL (ref 11.5–15.5)
HEMOGLOBIN: 7.6 G/DL (ref 11.5–15.5)
HUMAN METAPNEUMOVIRUS BY PCR: NOT DETECTED
HUMAN RHINOVIRUS/ENTEROVIRUS BY PCR: NOT DETECTED
IMMATURE GRANULOCYTES #: 0.12 E9/L
IMMATURE GRANULOCYTES %: 0.9 % (ref 0–5)
INFLUENZA A BY PCR: NOT DETECTED
INFLUENZA B BY PCR: NOT DETECTED
LACTIC ACID: 1.2 MMOL/L (ref 0.5–2.2)
LYMPHOCYTES ABSOLUTE: 1.6 E9/L (ref 1.5–4)
LYMPHOCYTES RELATIVE PERCENT: 12.1 % (ref 20–42)
MCH RBC QN AUTO: 26.8 PG (ref 26–35)
MCHC RBC AUTO-ENTMCNC: 29.9 % (ref 32–34.5)
MCV RBC AUTO: 89.6 FL (ref 80–99.9)
MONOCYTES ABSOLUTE: 1.42 E9/L (ref 0.1–0.95)
MONOCYTES RELATIVE PERCENT: 10.7 % (ref 2–12)
MYCOPLASMA PNEUMONIAE BY PCR: NOT DETECTED
NEUTROPHILS ABSOLUTE: 9.94 E9/L (ref 1.8–7.3)
NEUTROPHILS RELATIVE PERCENT: 75 % (ref 43–80)
PARAINFLUENZA VIRUS 1 BY PCR: NOT DETECTED
PARAINFLUENZA VIRUS 2 BY PCR: NOT DETECTED
PARAINFLUENZA VIRUS 3 BY PCR: NOT DETECTED
PARAINFLUENZA VIRUS 4 BY PCR: NOT DETECTED
PDW BLD-RTO: 17.6 FL (ref 11.5–15)
PLATELET # BLD: 449 E9/L (ref 130–450)
PMV BLD AUTO: 9.2 FL (ref 7–12)
POTASSIUM REFLEX MAGNESIUM: 4.4 MMOL/L (ref 3.5–5)
RBC # BLD: 2.8 E12/L (ref 3.5–5.5)
RESPIRATORY SYNCYTIAL VIRUS BY PCR: NOT DETECTED
SARS-COV-2, PCR: NOT DETECTED
SEDIMENTATION RATE, ERYTHROCYTE: 104 MM/HR (ref 0–20)
SODIUM BLD-SCNC: 135 MMOL/L (ref 132–146)
TOTAL PROTEIN: 6.2 G/DL (ref 6.4–8.3)
WBC # BLD: 13.3 E9/L (ref 4.5–11.5)

## 2022-07-05 PROCEDURE — 6370000000 HC RX 637 (ALT 250 FOR IP): Performed by: NURSE PRACTITIONER

## 2022-07-05 PROCEDURE — 6360000002 HC RX W HCPCS: Performed by: PEDIATRICS

## 2022-07-05 PROCEDURE — 85025 COMPLETE CBC W/AUTO DIFF WBC: CPT

## 2022-07-05 PROCEDURE — 85018 HEMOGLOBIN: CPT

## 2022-07-05 PROCEDURE — 94669 MECHANICAL CHEST WALL OSCILL: CPT

## 2022-07-05 PROCEDURE — 94664 DEMO&/EVAL PT USE INHALER: CPT

## 2022-07-05 PROCEDURE — 6360000002 HC RX W HCPCS: Performed by: NURSE PRACTITIONER

## 2022-07-05 PROCEDURE — 94640 AIRWAY INHALATION TREATMENT: CPT

## 2022-07-05 PROCEDURE — 0202U NFCT DS 22 TRGT SARS-COV-2: CPT

## 2022-07-05 PROCEDURE — 99233 SBSQ HOSP IP/OBS HIGH 50: CPT | Performed by: STUDENT IN AN ORGANIZED HEALTH CARE EDUCATION/TRAINING PROGRAM

## 2022-07-05 PROCEDURE — 1200000000 HC SEMI PRIVATE

## 2022-07-05 PROCEDURE — APPSS30 APP SPLIT SHARED TIME 16-30 MINUTES: Performed by: NURSE PRACTITIONER

## 2022-07-05 PROCEDURE — 2700000000 HC OXYGEN THERAPY PER DAY

## 2022-07-05 PROCEDURE — 85651 RBC SED RATE NONAUTOMATED: CPT

## 2022-07-05 PROCEDURE — 36415 COLL VENOUS BLD VENIPUNCTURE: CPT

## 2022-07-05 PROCEDURE — 2580000003 HC RX 258: Performed by: NURSE PRACTITIONER

## 2022-07-05 PROCEDURE — 83605 ASSAY OF LACTIC ACID: CPT

## 2022-07-05 PROCEDURE — 80053 COMPREHEN METABOLIC PANEL: CPT

## 2022-07-05 PROCEDURE — 86140 C-REACTIVE PROTEIN: CPT

## 2022-07-05 PROCEDURE — 99223 1ST HOSP IP/OBS HIGH 75: CPT | Performed by: NURSE PRACTITIONER

## 2022-07-05 PROCEDURE — 6370000000 HC RX 637 (ALT 250 FOR IP): Performed by: STUDENT IN AN ORGANIZED HEALTH CARE EDUCATION/TRAINING PROGRAM

## 2022-07-05 PROCEDURE — 2580000003 HC RX 258: Performed by: SPECIALIST

## 2022-07-05 PROCEDURE — 6360000002 HC RX W HCPCS: Performed by: SPECIALIST

## 2022-07-05 PROCEDURE — 85014 HEMATOCRIT: CPT

## 2022-07-05 PROCEDURE — A4216 STERILE WATER/SALINE, 10 ML: HCPCS | Performed by: SPECIALIST

## 2022-07-05 RX ORDER — ALBUTEROL SULFATE 0.63 MG/3ML
0.63 SOLUTION RESPIRATORY (INHALATION) 4 TIMES DAILY
Status: DISCONTINUED | OUTPATIENT
Start: 2022-07-05 | End: 2022-07-07 | Stop reason: HOSPADM

## 2022-07-05 RX ORDER — GUAIFENESIN 600 MG/1
600 TABLET, EXTENDED RELEASE ORAL 2 TIMES DAILY
Status: DISCONTINUED | OUTPATIENT
Start: 2022-07-05 | End: 2022-07-05 | Stop reason: ALTCHOICE

## 2022-07-05 RX ORDER — GUAIFENESIN 400 MG/1
400 TABLET ORAL 3 TIMES DAILY
Status: DISCONTINUED | OUTPATIENT
Start: 2022-07-05 | End: 2022-07-07 | Stop reason: HOSPADM

## 2022-07-05 RX ORDER — LACTOBACILLUS RHAMNOSUS GG 10B CELL
1 CAPSULE ORAL DAILY
Status: DISCONTINUED | OUTPATIENT
Start: 2022-07-05 | End: 2022-07-07 | Stop reason: HOSPADM

## 2022-07-05 RX ADMIN — LACTULOSE 20 G: 20 SOLUTION ORAL at 08:31

## 2022-07-05 RX ADMIN — CITALOPRAM HYDROBROMIDE 20 MG: 20 TABLET ORAL at 08:32

## 2022-07-05 RX ADMIN — GUAIFENESIN 400 MG: 400 TABLET ORAL at 21:05

## 2022-07-05 RX ADMIN — SODIUM CHLORIDE, PRESERVATIVE FREE 10 ML: 5 INJECTION INTRAVENOUS at 21:05

## 2022-07-05 RX ADMIN — ALBUTEROL SULFATE 0.63 MG: 0.63 SOLUTION RESPIRATORY (INHALATION) at 20:44

## 2022-07-05 RX ADMIN — MORPHINE SULFATE 4 MG: 4 INJECTION, SOLUTION INTRAMUSCULAR; INTRAVENOUS at 00:11

## 2022-07-05 RX ADMIN — ERTAPENEM SODIUM 1000 MG: 1 INJECTION, POWDER, LYOPHILIZED, FOR SOLUTION INTRAMUSCULAR; INTRAVENOUS at 14:47

## 2022-07-05 RX ADMIN — SODIUM CHLORIDE, PRESERVATIVE FREE 10 ML: 5 INJECTION INTRAVENOUS at 08:39

## 2022-07-05 RX ADMIN — PANTOPRAZOLE SODIUM 40 MG: 40 TABLET, DELAYED RELEASE ORAL at 05:35

## 2022-07-05 RX ADMIN — LEVOTHYROXINE SODIUM 25 MCG: 25 TABLET ORAL at 21:05

## 2022-07-05 RX ADMIN — Medication 1 CAPSULE: at 18:00

## 2022-07-05 RX ADMIN — MORPHINE SULFATE 30 MG: 30 TABLET, FILM COATED, EXTENDED RELEASE ORAL at 08:32

## 2022-07-05 RX ADMIN — OXYCODONE 20 MG: 5 TABLET ORAL at 12:33

## 2022-07-05 RX ADMIN — DOCUSATE SODIUM 50 MG AND SENNOSIDES 8.6 MG 2 TABLET: 8.6; 5 TABLET, FILM COATED ORAL at 08:32

## 2022-07-05 RX ADMIN — Medication 5000 UNITS: at 08:31

## 2022-07-05 RX ADMIN — GUAIFENESIN 400 MG: 400 TABLET ORAL at 18:00

## 2022-07-05 RX ADMIN — OXYCODONE 20 MG: 5 TABLET ORAL at 16:23

## 2022-07-05 RX ADMIN — ALBUTEROL SULFATE 0.63 MG: 0.63 SOLUTION RESPIRATORY (INHALATION) at 12:24

## 2022-07-05 RX ADMIN — MORPHINE SULFATE 30 MG: 30 TABLET, FILM COATED, EXTENDED RELEASE ORAL at 22:01

## 2022-07-05 RX ADMIN — GUAIFENESIN 400 MG: 400 TABLET ORAL at 12:33

## 2022-07-05 RX ADMIN — MULTIPLE VITAMINS W/ MINERALS TAB 1 TABLET: TAB at 08:31

## 2022-07-05 RX ADMIN — BENZONATATE 100 MG: 100 CAPSULE ORAL at 08:31

## 2022-07-05 RX ADMIN — CEFEPIME HYDROCHLORIDE 2000 MG: 2 INJECTION, POWDER, FOR SOLUTION INTRAVENOUS at 05:36

## 2022-07-05 ASSESSMENT — PAIN - FUNCTIONAL ASSESSMENT
PAIN_FUNCTIONAL_ASSESSMENT: ACTIVITIES ARE NOT PREVENTED
PAIN_FUNCTIONAL_ASSESSMENT: PREVENTS OR INTERFERES SOME ACTIVE ACTIVITIES AND ADLS
PAIN_FUNCTIONAL_ASSESSMENT: PREVENTS OR INTERFERES SOME ACTIVE ACTIVITIES AND ADLS
PAIN_FUNCTIONAL_ASSESSMENT: ACTIVITIES ARE NOT PREVENTED

## 2022-07-05 ASSESSMENT — PAIN DESCRIPTION - DESCRIPTORS
DESCRIPTORS: BURNING;SHARP
DESCRIPTORS: ACHING;SHOOTING
DESCRIPTORS: BURNING
DESCRIPTORS: CRAMPING;ACHING;PRESSURE

## 2022-07-05 ASSESSMENT — PAIN DESCRIPTION - ORIENTATION
ORIENTATION: LOWER
ORIENTATION: RIGHT;UPPER
ORIENTATION: LOWER
ORIENTATION: MID

## 2022-07-05 ASSESSMENT — PAIN SCALES - GENERAL
PAINLEVEL_OUTOF10: 6
PAINLEVEL_OUTOF10: 0
PAINLEVEL_OUTOF10: 10
PAINLEVEL_OUTOF10: 7
PAINLEVEL_OUTOF10: 7
PAINLEVEL_OUTOF10: 6
PAINLEVEL_OUTOF10: 9
PAINLEVEL_OUTOF10: 3
PAINLEVEL_OUTOF10: 7
PAINLEVEL_OUTOF10: 4
PAINLEVEL_OUTOF10: 7
PAINLEVEL_OUTOF10: 2

## 2022-07-05 ASSESSMENT — PAIN DESCRIPTION - PAIN TYPE: TYPE: CHRONIC PAIN

## 2022-07-05 ASSESSMENT — PAIN DESCRIPTION - LOCATION
LOCATION: BACK
LOCATION: BACK
LOCATION: ABDOMEN;CHEST
LOCATION: BACK

## 2022-07-05 ASSESSMENT — PAIN SCALES - WONG BAKER: WONGBAKER_NUMERICALRESPONSE: 0

## 2022-07-05 NOTE — CONSULTS
[facility]  Hematology/Oncology  Consult      Patient Name: Balbir New  YOB: 1950  PCP: Tamra Weber DO   Referring Provider:      Reason for Consultation:   Chief Complaint   Patient presents with    Shortness of Breath     uses home O2, has lung CA    Hemoptysis        History of Present Illness: This is a 65-year-old female patient with a PMH of HLD, GERD, hypothyroidism, and depression who follows with Dr. Hilario Orellana initially seen on 6/10/2022 for consult after after being seen in the ED after her blood work revealed normocytic anemia with slight neutrophilic leukocytosis. CT chest showed multiple large masses in the and CTAP with a 2 cm lytic lesion on the right symphysis pubis. Her  was 230, with a normal CEA and CA 15-3. CT-guided biopsy of the lung mass consistent with invasive adenocarcinoma but cannot r/o metastatic adenocarcinoma. . Her cell block is being sent for tissue of origin, molecular markers, and PDL1. She is also s/p IV iron. Patient presented to the ED for current admission for evaluation of worsening SOB. CTA in the ED with no PE; extensive patchy ground glass infiltrates and B pleural effusions. Multiple pedro pulmonary masses and nodules some of which are necrotic. ID following. Patient is on cefepime and vancomycin for concern for pneumonia. Pulmonology consulted patient for possible bronch. Procal 0.12, . CBC with WBC 13.3, hgb 7.5 today s/p 1 unit of pRBC's for hgb of 6.2 on admission and platelets of 076. Consultation for patient with known cancer. Review of systems: Over 10 systems were reviewed and all were negative except as mentioned above.       Diagnostic Data:     Past Medical History:   Diagnosis Date    Arthritis     Cancer ([facility] [address] 06/24/2022    lung    Hyperlipidemia     Right shoulder pain        Patient Active Problem List    Diagnosis Date Noted    Acute respiratory failure with hypoxia ([facility] [address] 07/04/2022    Acute respiratory insufficiency 07/04/2022    Lung cancer ([facility] [address] 07/04/2022    Palliative care encounter     Goals of care, counseling/discussion     DNR (do not resuscitate) discussion     Anemia due to gastrointestinal blood loss 06/20/2022    Intractable pain 06/20/2022    Right hip pain 06/20/2022    Lung mass 06/20/2022    Thyroid disease 06/20/2022    Gastroesophageal reflux disease without esophagitis     Anxiety     Hiatal hernia     Chronic left shoulder pain 01/25/2019    Chest pain 02/20/2018    Hyperlipidemia LDL goal <100 02/20/2018    Acute cystitis 02/20/2018        Past Surgical History:   Procedure Laterality Date    COLONOSCOPY      CT NEEDLE BIOPSY LUNG PERCUTANEOUS  06/21/2022    CT NEEDLE BIOPSY LUNG PERCUTANEOUS 6/21/2022 [facility] CT    EYE SURGERY Bilateral     Cataracts    HYSTERECTOMY (CERVIX STATUS UNKNOWN)      2017    SHOULDER ARTHROSCOPY Right 09/05/2019    RIGHT SHOULDER ARTHROSCOPY ROTATOR CUFF REPAIR POSSIBLE BICEPS TENODESIS performed by Roshan Oconnell MD at [address] ARTHROSCOPY Left 05/21/2020    LEFT SHOULDER ARTHROSCOPY, ROTATOR CUFF REPAIR BICEPS TENODESIS (ARTHREX, INTERSCALENE BLOCK) performed by Roshan Oconnell [facility] at [facility]      URETEROSCOPY Left 09/14/2015       Family History  Family History   Problem Relation Age of Onset    Breast Cancer Sister 77    Breast Cancer Niece 48       Social History    TOBACCO:   reports that she has never smoked. She has never used smokeless tobacco.  ETOH:   reports no history of alcohol use. Home Medications  Prior to Admission medications    Medication Sig Start Date End Date Taking? Authorizing Provider   morphine (MS CONTIN) 30 MG extended release tablet Take 1 tablet by mouth 2 times daily for 7 days.  6/29/22 7/6/22  PORSHA Cee - CNP   citalopram (CELEXA) 20 MG tablet Takes 20 mg daily 6/27/22   Rayo Buchanan DO   sennosides-docusate sodium (SENOKOT-S) 8.6-50 MG tablet Take 2 tablets by mouth 2 times daily 6/24/22 7/24/22  MICHELLE Maloney   ondansetron (ZOFRAN-ODT) 4 MG disintegrating tablet Take 1 tablet by mouth every 8 hours as needed for Nausea or Vomiting 6/24/22 7/24/22  MICHELLE Maloney   oxyCODONE (ROXICODONE) 20 MG immediate release tablet Take 1 tablet by mouth every 4 hours as needed for Pain for up to 30 days.  6/24/22 7/24/22  PORSHA Worthington - CNP   cyclobenzaprine (FLEXERIL) 5 MG tablet Take 1 tablet by mouth 3 times daily as needed for Muscle spasms 6/24/22 7/24/22  PORSHA Worthington - CNP   lactulose (CHRONULAC) 10 GM/15ML solution Take 30 mLs by mouth daily 6/10/22   Historical Provider, MD   benzonatate (TESSALON) 100 MG capsule Take 1 capsule by mouth daily 6/10/22   Historical Provider, MD   levothyroxine (SYNTHROID) 25 MCG tablet Take 1 tablet by mouth Daily 5/25/22   Aureliano Buchanan DO   omeprazole (PRILOSEC) 20 MG delayed release capsule Take 20 mg by mouth daily    Historical Provider, MD   Cholecalciferol (VITAMIN D3) 2000 UNITS CAPS Take 5,000 Units by mouth daily     Historical Provider, MD   Multiple Vitamins-Minerals (MULTIVITAMIN & MINERAL PO) Take 1 tablet by mouth daily    Historical Provider, MD   calcium carbonate 600 MG TABS tablet Take 1 tablet by mouth daily  Patient not taking: Reported on 6/20/2022    Historical Provider, MD   Omega-3 Fatty Acids (FISH OIL) 1000 MG CAPS Take 2,000 mg by mouth 2 times daily   Patient not taking: Reported on 6/20/2022    Historical Provider, MD       Allergies  Allergies   Allergen Reactions    Atorvastatin     Avelox [Moxifloxacin]     Codeine     Daypro [Oxaprozin] Itching    Demerol Hcl [Meperidine]     Levaquin [Levofloxacin In D5w]     Tape [Adhesive Tape] Itching     Ok to use paper tape per pt    Valium [Diazepam]            Objective  BP (!) 107/54   Pulse 69   Temp 98.2 °F (36.8 °C) (Oral)   Resp 18   Ht 5' 4\" (1.626 m)   Wt 169 lb 14.4 oz (77.1 kg)   SpO2 92%   BMI 29.16 kg/m²     Physical Exam:   Performance Status:  General: AAO to person, place, time, in distress due to chest wall pain  Head and neck : PERRLA, EOMI . Sclera non icteric. Oropharynx : Clear  Neck: no JVD,  no adenopathy  Heart: Regular rate and regular rhythm, no murmur  Lungs: Clear to auscultation   Extremities: No edema,no cyanosis, no clubbing. Abdomen: Soft, non-tender;no masses, no organomegaly  Skin:  No rash. Neurologic:Cranial nerves grossly intact. No focal motor or sensory deficits . Recent Laboratory Data-   Lab Results   Component Value Date    WBC 13.3 (H) 07/05/2022    HGB 7.5 (L) 07/05/2022    HCT 25.1 (L) 07/05/2022    MCV 89.6 07/05/2022     07/05/2022    LYMPHOPCT 12.1 (L) 07/05/2022    RBC 2.80 (L) 07/05/2022    MCH 26.8 07/05/2022    MCHC 29.9 (L) 07/05/2022    RDW 17.6 (H) 07/05/2022    NEUTOPHILPCT 75.0 07/05/2022    MONOPCT 10.7 07/05/2022    BASOPCT 0.5 07/05/2022    NEUTROABS 9.94 (H) 07/05/2022    LYMPHSABS 1.60 07/05/2022    MONOSABS 1.42 (H) 07/05/2022    EOSABS 0.11 07/05/2022    BASOSABS 0.06 07/05/2022       Lab Results   Component Value Date     07/05/2022    K 4.4 07/05/2022    CL 98 07/05/2022    CO2 26 07/05/2022    BUN 10 07/05/2022    CREATININE 0.5 07/05/2022    GLUCOSE 113 (H) 07/05/2022    CALCIUM 8.9 07/05/2022    PROT 6.2 (L) 07/05/2022    LABALBU 2.3 (L) 07/05/2022    BILITOT 0.6 07/05/2022    ALKPHOS 64 07/05/2022    AST 23 07/05/2022    ALT 13 07/05/2022    LABGLOM >60 07/05/2022    GFRAA >60 07/05/2022       Lab Results   Component Value Date    IRON 14 (L) 06/21/2022    TIBC 242 (L) 06/21/2022    FERRITIN 214 06/21/2022           Radiology-    CT CHEST WO CONTRAST    Result Date: 6/21/2022  EXAMINATION: CT OF THE CHEST WITHOUT CONTRAST 6/21/2022 9:16 am TECHNIQUE: CT of the chest was performed without the administration of intravenous contrast. Multiplanar reformatted images are provided for review.  Automated exposure control, iterative reconstruction, and/or weight based adjustment of the mA/kV was utilized to reduce the radiation dose to as low as reasonably achievable. COMPARISON: 6/1/2022 HISTORY: ORDERING SYSTEM PROVIDED HISTORY: mass TECHNOLOGIST PROVIDED HISTORY: Reason for exam:->mass FINDINGS: Mediastinum: The thoracic aorta is normal in caliber. Gross lymphadenopathy is not appreciated on the unenhanced images. .  There is no pericardial effusion. Lungs/pleura: Within the left lower lobe, there is a dense mass measuring approximately 7 x 5 cm. There is a mass seen within the right middle lobe laterally measuring approximately 3 x 4.2 cm. There is surrounding pulmonary infiltration consistent with postobstructive pneumonia. There is a cavitary mass seen within the right upper lobe measuring 3.0 x 3.0 cm. Ground-glass infiltrates are seen within the right upper lobe. There is a nodule seen within the left upper lobe measuring 6 mm. There is a cavitary lesion seen within the left upper lobe laterally measuring 2 x 1.1 cm. Upper Abdomen: There is a moderate sized hiatal hernia. Soft Tissues/Bones:  Age related degenerative changes of the visualized osseous structures without focal destructive lesion. No gross lytic or blastic lesion is seen within the thoracic spine or ribs. 1. Multiple bilateral pulmonary masses, unchanged when compared with the patient's previous CT scan of the thorax of 6/1/2022. 2. Postobstructive pneumonia within the right upper lobe, right middle lobe and right lower lobe. 3. The patient is scheduled for CT-guided biopsy.  RECOMMENDATIONS: Unavailable     CT GUIDED NEEDLE PLACEMENT    Result Date: 6/21/2022  PROCEDURE: CT NEEDLE BIOPSY LUNG PERCUTANEOUS; CT GUIDED NDL PLACEMENT MODERATE CONSCIOUS SEDATION 6/21/2022 HISTORY: ORDERING SYSTEM PROVIDED HISTORY: lung biopsy-pt was on outpt schedule for this procedure 06/21/22 TECHNOLOGIST PROVIDED HISTORY: Reason for exam:->lung biopsy-pt was on outpt constipation. Stable abnormal appearance of the right pubic symphysis with lytic lesion identified concerning for malignancy or metastatic disease. XR CHEST PORTABLE    Result Date: 7/4/2022  EXAMINATION: ONE XRAY VIEW OF THE CHEST 7/4/2022 9:42 am COMPARISON: Chest CT scan 21 June 2022 HISTORY: ORDERING SYSTEM PROVIDED HISTORY: shortness of breath TECHNOLOGIST PROVIDED HISTORY: Reason for exam:->shortness of breath FINDINGS: Notably worsening airspace consolidation in the right lung which is now confluent. A left lower lobe lung mass is redemonstrated and the borders are rather ill-defined suggesting that there is adjacent infiltrate and or atelectasis. Worsening airspace consolidation on the right with confluence now present. Left lung mass with likely adjacent infiltrate and or atelectasis. XR CHEST 1 VIEW    Result Date: 6/21/2022  EXAMINATION: ONE XRAY VIEW OF THE CHEST 6/21/2022 12:01 pm COMPARISON: None. HISTORY: ORDERING SYSTEM PROVIDED HISTORY: poist lung biopsy TECHNOLOGIST PROVIDED HISTORY: TIMED FOR 1130 1 HOUR Reason for exam:->poist lung biopsy FINDINGS: A single view of the chest were obtained following the left lung biopsy 1 hour post biopsy. There is no left pneumothorax There are multiple masses seen within the right lung, the largest is within the right lower lobe. There are multiple masses seen within the left lung. The largest is seen within the left lower lobe. The left lower lobe mass was biopsied under CT guidance. The cardiac silhouette is within normals. 1. Successful CT-guided biopsy of the mass within the left lower lobe 2.  There is no left pneumothorax     CT NEEDLE BIOPSY LUNG/MEDIASTINUM PERCUTANEOUS    Result Date: 6/21/2022  PROCEDURE: CT NEEDLE BIOPSY LUNG PERCUTANEOUS; CT GUIDED NDL PLACEMENT MODERATE CONSCIOUS SEDATION 6/21/2022 HISTORY: ORDERING SYSTEM PROVIDED HISTORY: lung biopsy-pt was on outpt schedule for this procedure 06/21/22 TECHNOLOGIST PROVIDED HISTORY: Reason for exam:->lung biopsy-pt was on outpt schedule for this procedure 06/21/22 TECHNIQUE: Automated exposure control, iterative reconstruction, and/or weight based adjustment of the mA/kV was utilized to reduce the radiation dose to as low as reasonably achievable. Correlation is made with the patient's previous CT scan of 6/1/2022. CONTRAST: None SEDATION: The administration, documentation and monitoring of IV conscious sedation was under my direct supervision for time frame of 35 minutes. 100 mcg of IV fentanyl was administered. Versed was not administered. Interventional radiology nursing staff monitored the patient throughout the exam. DESCRIPTION OF PROCEDURE: Informed consent was obtained after a detailed explanation of the procedure including risks, benefits, and alternatives. Universal protocol was observed. FINDINGS: The patient was placed on the CT table and a time-out was performed. Axial images through the thorax were obtained. The more solid mass seen within the left lower lobe measuring 5 cm x 7 cm was identified. Additional masses within the right lung were seen; however, it was felt that the mass within the left lower lobe is more solid and may give a more accurate biopsy result. The patient's left side was then prepped and draped in a sterile fashion using maximum sterile barrier technique. Following the uneventful administration of lidocaine, I introduced a 20-gauge coaxial needle into the mass. A total of four 20-gauge core biopsies were obtained and placed in formalin. The patient tolerated the procedure well and there were no complications. Successful 20-gauge core biopsy of the 5 cm x 7 cm solid mass seen within the left lower lobe. Administration of conscious sedation.      CTA PULMONARY W CONTRAST    Result Date: 7/4/2022  EXAMINATION: CTA OF THE CHEST 7/4/2022 1:06 pm TECHNIQUE: CTA of the chest was performed after the administration of intravenous contrast. Multiplanar reformatted images are provided for review. MIP images are provided for review. Automated exposure control, iterative reconstruction, and/or weight based adjustment of the mA/kV was utilized to reduce the radiation dose to as low as reasonably achievable. COMPARISON: 06/21/2022 HISTORY: ORDERING SYSTEM PROVIDED HISTORY: shortness of breath TECHNOLOGIST PROVIDED HISTORY: Reason for exam:->shortness of breath Decision Support Exception - unselect if not a suspected or confirmed emergency medical condition->Emergency Medical Condition (MA) FINDINGS: There is borderline cardiac size. The great vessels are normal.  Moderately enlarged mediastinal and hilar lymph nodes are present with lymph nodes measuring up to 1.6 cm in the AP window, paratracheal region, subcarinal region and pulmonary hilum. There are no filling defects in the main pulmonary artery and the central branches. There is diffuse multifocal patchy and ground-glass infiltrates bilaterally with moderate pleural effusions. The previously noted bilateral pulmonary masses are identified with the larger 1 in the left lower lobe measuring 7.3 x 7.8 x 7 cm and larger 1 which is necrotic in the right upper lobe measuring 3.6 x 3.2 cm. Nodules are identified in the left upper lobe some of which are cavitary with cavitary nodule measuring nearly 2.2 x 1.3 cm with additional smaller nodules measuring 5-6 mm. Liver is decreased in attenuation likely fatty. Gallbladder is distended. Consider ultrasonography. Peripelvic cysts are identified in the left kidney. A hiatal hernia is noted. No central pulmonary embolism or aortic dissection. Extensive patchy and ground-glass infiltrates and pleural effusions concerning for diffuse pneumonia and or edema. Multiple bilateral pulmonary masses and nodules as noted some of which are necrotic concerning for malignancy. Superimposed complicated pneumonia is also consideration.   Please correlate with the pathology report from tissue sampling. ASSESSMENT/PLAN :  71-year-old female  -HLD, GERD, hypothyroidism, and depression   -  Normocytic anemia. S/p IV iron   Recent CT-guided biopsy of the lung mass consistent with invasive adenocarcinoma but cannot r/o metastatic adenocarcinoma. Her cell block is being sent for tissue of origin, molecular markers, and PDL1. Her  was 230, with a normal CEA and CA 15-3. -CTA in the ED with no PE; extensive patchy ground glass infiltrates and B pleural effusions. Multiple pedro pulmonary masses and nodules some of which are necrotic.  - ID following. Patient is on cefepime and vancomycin for concern for pneumonia. - Pulmonology consulted patient for possible bronch. -  Procal 0.12, . CBC with WBC 13.3, hgb 7.5 today s/p 1 unit of pRBC's for hgb of 6.2 on admission and platelets of 542. Attending addendum:  Antibiotics for pneumonia by ID. Hemoptysis. Bronchoscopy deferred for now. Right chest wall pain: Due to malignancy. Not adequately controlled with opioid pain regimen. Palliative is on board. Anemia: Likely due to anemia of chronic inflammation plus blood loss from hemoptysis. We will check iron panel. Will start treatment as outpatient upon discharge. PORSHA Leiva - CNP  Electronically signed 7/5/2022 at 10:10 AM   Patient seen and examined. Agree with CNP's assessment and plan. Note updated.   Tara Wang MD    \

## 2022-07-05 NOTE — CONSULTS
Pulmonary Consultation    Admit Date: 7/4/2022  Requesting Physician: Fawad Aguilar DO    CC:  Pneumonia; lung cancer    HPI:  Beatriz Hubbard is a 66 y.o. female life long non smoker with a PMH of thyroid, HDL, arthritis and newly diagnosed adenocarcinoma of the lung (06/21/22). She was seen as an oupt 05/24 and was ordered a CT of the chest due to continue thoracic pain, but had not had it completed and ended up in the ED 05/31 with abd/hip pain and constipation and had a Chest CTA on 06/01 which revealed multiple masses bilateral lungs with some cavitation large at LLL 7.5cm and probable pna. Side bar with ID was done and no abx were inititated. She was to f/u with oncology and was admitted to [address] [place] [address] 06/20-06/24 with right hip/chest pain and on 06/21 underwent IR biopsy of the L lung mass which pathology shows as invasive adenocarcinoma but cannot r/o metastatic adenocarcinoma. She followed with oncology who ordered additional pathology testing which is still pending. She was discharged on 2L nc due to hypoxia. She presented to the ED with complaints of worsening sob, hemoptysis, weakness, poor appetite and black stool constipation that is worsening over the past 2 weeks. She is also having pain R rib cage that wraps around to the front, R hip pain. Her daughter, who is a RRT, increased her O2 to 4L but she still had significant hypoxia down to 83%. Having a difficult time expectorating hemoptysis. No fevers but would get very hot spells, no chills, no sick contacts. She was noted to be anemic with hgb 6.2 and received 1u PRBC. CTA in the ED with no PE; extensive patchy ground glass infiltrates and B pleural effusions. Multiple pedro pulmonary masses and nodules some of which are necrotic. Procal 0.12, WBC 12.3, .       PMH:    Past Medical History:   Diagnosis Date    Arthritis     Cancer ([facility] [address] 06/24/2022    lung    Hyperlipidemia     Right shoulder pain      PSH:    Past Surgical History:   Procedure Laterality Date    COLONOSCOPY      CT NEEDLE BIOPSY LUNG PERCUTANEOUS  06/21/2022    CT NEEDLE BIOPSY LUNG PERCUTANEOUS 6/21/2022 BILL CT    EYE SURGERY Bilateral     Cataracts    HYSTERECTOMY (CERVIX STATUS UNKNOWN)      2017    SHOULDER ARTHROSCOPY Right 09/05/2019    RIGHT SHOULDER ARTHROSCOPY ROTATOR CUFF REPAIR POSSIBLE BICEPS TENODESIS performed by Luiza Pandey MD at [address] ARTHROSCOPY Left 05/21/2020    LEFT SHOULDER ARTHROSCOPY, ROTATOR CUFF REPAIR BICEPS TENODESIS (ARTHREX, INTERSCALENE BLOCK) performed by Luiza Pandey MD at [address]  URETEROSCOPY Left 09/14/2015          Respiratory ROS: dyspnea, poor appetite, weakness, cough with hemoptysis, black stools, hot spells, R rib/hip pain. Otherwise, a complete review of systems is undertaken and is negative. Social History:  Social History     Socioeconomic History    Marital status:      Spouse name: Not on file    Number of children: Not on file    Years of education: Not on file    Highest education level: Not on file   Occupational History     Employer: NONE   Tobacco Use    Smoking status: Never Smoker    Smokeless tobacco: Never Used   Vaping Use    Vaping Use: Never used   Substance and Sexual Activity    Alcohol use: No    Drug use: No    Sexual activity: Not on file   Other Topics Concern    Not on file   Social History Narrative    Not on file     Social Determinants of Health     Financial Resource Strain: Low Risk     Difficulty of Paying Living Expenses: Not hard at all   Food Insecurity: No Food Insecurity    Worried About [address] in the Last Year: Never true    [facility] in the Last Year: Never true   Transportation Needs:     Lack of Transportation (Medical): Not on file    Lack of Transportation (Non-Medical):  Not on file   Physical Activity:     Days of Exercise per Week: Not on file    Minutes of Exercise per Session: 24HR INTAKE/OUTPUT:      Intake/Output Summary (Last 24 hours) at 2022 0802  Last data filed at 2022 0647  Gross per 24 hour   Intake 54.1 ml   Output --   Net 54.1 ml     CURRENT PULSE OXIMETRY:  SpO2: 93 %  24HR PULSE OXIMETRY RANGE:  SpO2  Av.7 %  Min: 88 %  Max: 97 %      EXAM:  General: No distress. Eyes: PERRL. No sclera icterus. No conjunctival injection. ENT: No discharge. Pharynx clear. Neck: Trachea midline. Normal thyroid. Resp: No accessory muscle use. Diminished > bibasilar with coarse rales bilateral up R mid lung, scattered rhonchi which clears with cough. Bright red hemoptysis, mod amount. No wheezing. CV: Regular rate. Regular rhythm. No mumur or rub. ABD: Non-tender. Non-distended. No masses. No organmegaly. Normal bowel sounds. Skin: Warm and dry. No nodule on exposed extremities. No rash on exposed extremities. Lymph: No cervical LAD. No supraclavicular LAD. Ext: No joint deformity. No clubbing. No cyanosis. No edema  Neuro: Awake. Follows commands      Lab Results:  CBC:   Recent Labs     22  1056 22  1825 22  0036   WBC 12.3*  --  13.3*   HGB 6.2* 8.1* 7.5*   HCT 21.4* 26.6* 25.1*   MCV 91.1  --  89.6   *  --  449     BMP:   Recent Labs     22  1056 22  0036    135   K 3.9 4.4   CL 96* 98   CO2 27 26   BUN 10 10   CREATININE 0.5 0.5     LFT:   Recent Labs     22  1056 22  0036   ALKPHOS 67 64   ALT 10 13   AST 21 23   PROT 6.4 6.2*   BILITOT 0.5 0.6   BILIDIR <0.2  --    LABALBU 2.6* 2.3*     PT/INR: No results for input(s): PROTIME, INR in the last 72 hours. Cultures:  No results for input(s): CULTRESP in the last 72 hours. ABG:   No results for input(s): PH, PO2, PCO2, HCO3, BE, O2SAT in the last 72 hours. Films:  XR CHEST 2022: Notably worsening airspace consolidation in the right lung which is now confluent.   A left lower lobe lung mass is redemonstrated and the borders are rather ill-defined suggesting that there is adjacent infiltrate and or atelectasis. Worsening airspace consolidation on the right with confluence now present. Left lung mass with likely adjacent infiltrate and or atelectasis. 07/04 06/21      CTA PULMONARY 7/4/2022: There is borderline cardiac size. The great vessels are normal.  Moderately enlarged mediastinal and hilar lymph nodes are present with lymph nodes measuring up to 1.6 cm in the AP window, paratracheal region, subcarinal region and pulmonary hilum. There are no filling defects in the main pulmonary artery and the central branches. There is diffuse multifocal patchy and ground-glass infiltrates bilaterally with moderate pleural effusions. The previously noted bilateral pulmonary masses are identified with the larger 1 in the left lower lobe measuring 7.3 x 7.8 x 7 cm and larger 1 which is necrotic in the right upper lobe measuring 3.6 x 3.2 cm. Nodules are identified in the left upper lobe some of which are cavitary with cavitary nodule measuring nearly 2.2 x 1.3 cm with additional smaller nodules measuring 5-6 mm. Liver is decreased in attenuation likely fatty. Gallbladder is distended. Consider ultrasonography. Peripelvic cysts are identified in the left kidney. A hiatal hernia is noted. No central pulmonary embolism or aortic dissection. Extensive patchy and ground-glass infiltrates and pleural effusions concerning for diffuse pneumonia and or edema. Multiple bilateral pulmonary masses and nodules as noted some of which are necrotic concerning for malignancy. Superimposed complicated pneumonia is also consideration. Please correlate with the pathology report from tissue sampling.       06/20/22 CT ABD/PELVIS: Airspace disease identified lower lung fields bilaterally. There is stool seen scattered throughout the colon to suggest clinical presentation of constipation.    Stable abnormal appearance of the right pubic symphysis with lytic lesion identified concerning for malignancy or metastatic disease. Assessment:  · Acute respiratory failure  · Adenocarcinoma of the lung, path 06/21- probable metastatic  · Pneumonia - ?post obstructive  · Bilateral pleural effusions  · Hypothyroid   · HDL  · Arthritis      Plan:  · Continue O2, currently on 7L HFNC. Wean to keep pox >92%. Was recently d/c'd on 2L. · Known recent  lung ca - cannot differentiate between primary or metastatic at this time. Path on 06/21 with adeno. Additional path results pending. Will review CT results with Dr. Mac Burns. Pt with B pleural effusion >L, most likely will watch for now. May need bronch, will d/w Dr. Mac Burns. · Currently on Cefepime and vanc, PCT 0.12, leukocytosis 13.3. ID consulted - will defer antibiotics to their discretion. · Check respiratory panel, legionella, strep pneumoniae, sputum culture. Will add mucinex. · Oncology ordered PDL-1 path which is pending - called pathology this am, may take another several days to get results. Oncology consulted. · Saw Palliative Care recently and would like to see again. · IS, flutter valve  · DNR CCA    Thank you for allowing us to see this patient in consultation. Please contact us with any questions.     PORSHA Rodríguez CNP       Electronically signed by PORSHA Rodríguez CNP on 7/5/2022 at 8:02 AM     Seen and evaluated, and agree with above assessment and plan  Antibiotics  provided by the ID service  Sputum for gram stain and culture  Albuterol and Mucinex to enhance expectoration  Hemoptysis was mild to moderate, if worsening consider bronchoscopy  Discussed with patient, patient's daughter and  at bedside

## 2022-07-05 NOTE — CONSULTS
[address] [place] [address] [facility]  Consultation Note     Admit Date: 7/4/2022 10:31 AM    Reason for Consult:   Recent postobstructive pneumonia. Now with extensive patchy and groundglass infiltrates on CTA    Attending Physician:  Mary Wallis MD    HISTORY OF PRESENT ILLNESS:             The history is obtained from extensive review of available past medical records. The patient is a 66 y.o. female who is not known to the ID service. The patient was recently admitted to the hospital on 6/20/2022 for a lung mass and concern for cancer. She underwent a CT-guided biopsy that showed invasive adenocarcinoma. She was having intractable abdominal and bone pain that was related to the mass. She was discharged on 6/24/2022. The patient comes back to the ED at [facility] on 7/4/2022 with shortness of breath, hemoptysis and hypoxia. She was afebrile and had a pulse oximetry of 88% on presentation. Her white count was 12.3. She was treated with Cefepime and Vancomycin. She is feeling slightly better. She does admit to having a pleuritic type pain in the right anterior chest, radiating towards her back. No fevers. No chills. She has had nausea and emesis. Appetite is poor.     Past Medical History:        Diagnosis Date    Arthritis     Cancer ([address] 06/24/2022    lung    Hyperlipidemia     Right shoulder pain      Past Surgical History:        Procedure Laterality Date    COLONOSCOPY      CT NEEDLE BIOPSY LUNG PERCUTANEOUS  06/21/2022    CT NEEDLE BIOPSY LUNG PERCUTANEOUS 6/21/2022 [facility] CT    EYE SURGERY Bilateral     Cataracts    HYSTERECTOMY (CERVIX STATUS UNKNOWN)      2017    SHOULDER ARTHROSCOPY Right 09/05/2019    RIGHT SHOULDER ARTHROSCOPY ROTATOR CUFF REPAIR POSSIBLE BICEPS TENODESIS performed by Tameka Schmitt MD at [address] [place] [address] ARTHROSCOPY Left 05/21/2020    LEFT SHOULDER ARTHROSCOPY, ROTATOR CUFF REPAIR BICEPS TENODESIS (ARTHREX, INTERSCALENE BLOCK) performed by Roxane Claude, MD at [address] [facility] URETEROSCOPY Left 09/14/2015     Current Medications:   Scheduled Meds:   albuterol  0.63 mg Nebulization 4x daily    guaiFENesin  400 mg Oral TID    vancomycin  1,000 mg IntraVENous Q24H    cefepime  2,000 mg IntraVENous Q12H    benzonatate  100 mg Oral Daily    Vitamin D  5,000 Units Oral Daily    citalopram  20 mg Oral Daily    lactulose  30 mL Oral Daily    morphine  30 mg Oral BID    therapeutic multivitamin-minerals  1 tablet Oral Daily    pantoprazole  40 mg Oral QAM AC    sennosides-docusate sodium  2 tablet Oral BID    sodium chloride flush  5-40 mL IntraVENous 2 times per day    levothyroxine  25 mcg Oral Daily     Continuous Infusions:   sodium chloride      sodium chloride       PRN Meds:sodium chloride, ondansetron, oxyCODONE, sodium chloride flush, sodium chloride, polyethylene glycol, acetaminophen **OR** acetaminophen, magnesium sulfate, potassium chloride **OR** potassium alternative oral replacement **OR** potassium chloride    Allergies:  Atorvastatin, Avelox [moxifloxacin], Codeine, Daypro [oxaprozin], Demerol hcl [meperidine], Levaquin [levofloxacin in d5w], Tape [adhesive tape], and Valium [diazepam]    Social History:   Social History     Socioeconomic History    Marital status:      Spouse name: Not on file    Number of children: Not on file    Years of education: Not on file    Highest education level: Not on file   Occupational History     Employer: NONE   Tobacco Use    Smoking status: Never Smoker    Smokeless tobacco: Never Used   Vaping Use    Vaping Use: Never used   Substance and Sexual Activity    Alcohol use: No    Drug use: No    Sexual activity: Not on file   Other Topics Concern    Not on file   Social History Narrative    Not on file     Social Determinants of Health     Financial Resource Strain: Low Risk     Difficulty of Paying Living Expenses: Not hard at all   Food 29.16 kg/m²   Constitutional: The patient is awake, alert, and oriented. Skin: Warm and dry. No rashes were noted. HEENT: Eyes show round, and reactive pupils. No jaundice. Moist mucous membranes, no ulcerations, no thrush. Neck: Supple to movements. No lymphadenopathy. Chest: No use of accessory muscles to breathe. Symmetrical expansion. Auscultation reveals no wheezing, crackles, or rhonchi. Cardiovascular: S1 and S2 are rhythmic and regular. No murmurs appreciated. Abdomen: Positive bowel sounds to auscultation. Benign to palpation. No masses felt. No hepatosplenomegaly. Extremities: No clubbing, no cyanosis, no edema. Lines: Peripheral.      CBC+dif:  Recent Labs     07/04/22  1056 07/04/22  1825 07/05/22  0036   WBC 12.3*  --  13.3*   HGB 6.2*   < > 7.5*   HCT 21.4*   < > 25.1*   MCV 91.1  --  89.6   *  --  449   NEUTROABS 9.71*  --  9.94*    < > = values in this interval not displayed.      Lab Results   Component Value Date    CRP 14.3 (H) 06/23/2022    CRP 4.9 (H) 04/28/2022    CRP 0.2 08/06/2019      No results found for: [facility]  Lab Results   Component Value Date    SEDRATE 82 (H) 04/28/2022    SEDRATE 13 08/06/2019    SEDRATE 15 02/22/2016     Lab Results   Component Value Date    ALT 13 07/05/2022    AST 23 07/05/2022    ALKPHOS 64 07/05/2022    BILITOT 0.6 07/05/2022     Lab Results   Component Value Date/Time     07/05/2022 12:36 AM    K 4.4 07/05/2022 12:36 AM    CL 98 07/05/2022 12:36 AM    CO2 26 07/05/2022 12:36 AM    BUN 10 07/05/2022 12:36 AM    CREATININE 0.5 07/05/2022 12:36 AM    GFRAA >60 07/05/2022 12:36 AM    LABGLOM >60 07/05/2022 12:36 AM    GLUCOSE 113 07/05/2022 12:36 AM    GLUCOSE 83 05/29/2012 11:10 AM    PROT 6.2 07/05/2022 12:36 AM    LABALBU 2.3 07/05/2022 12:36 AM    LABALBU 4.5 05/29/2012 11:10 AM    CALCIUM 8.9 07/05/2022 12:36 AM    BILITOT 0.6 07/05/2022 12:36 AM    ALKPHOS 64 07/05/2022 12:36 AM    AST 23 07/05/2022 12:36 AM    ALT 13 07/05/2022 12:36 AM       Lab Results   Component Value Date/Time    PROTIME 17.0 06/21/2022 02:45 AM    INR 1.6 06/21/2022 02:45 AM       Lab Results   Component Value Date/Time    TSH 4.510 05/24/2022 02:17 PM       Lab Results   Component Value Date/Time    COLORU Yellow 06/20/2022 11:15 AM    PHUR 5.5 06/20/2022 11:15 AM    WBCUA NONE 06/20/2022 11:15 AM    RBCUA NONE 06/20/2022 11:15 AM    BACTERIA NONE SEEN 06/20/2022 11:15 AM    CLARITYU Clear 06/20/2022 11:15 AM    SPECGRAV 1.010 06/20/2022 11:15 AM    LEUKOCYTESUR Negative 06/20/2022 11:15 AM    UROBILINOGEN 0.2 06/20/2022 11:15 AM    BILIRUBINUR Negative 06/20/2022 11:15 AM    BLOODU Negative 06/20/2022 11:15 AM    GLUCOSEU Negative 06/20/2022 11:15 AM       No results found for: HCO3, BE, O2SAT, PH, THGB, PCO2, PO2, TCO2  Radiology:      Microbiology:  Pending  No results for input(s): BC in the last 72 hours. No results for input(s): ORG in the last 72 hours. No results for input(s): Peder Quinn in the last 72 hours. No results for input(s): STREPNEUMAGU in the last 72 hours. No results for input(s): LP1UAG in the last 72 hours. No results for input(s): ASO in the last 72 hours. No results for input(s): CULTRESP in the last 72 hours. Recent Labs     07/04/22  1056   PROCAL 0.12*       Assessment:  · Adenocarcinoma of the lung with metastasis to symphysis pubis  · Probable postobstructive pneumonia  · Leukocytosis    Plan:    · Change Vancomycin and Cefepime to Ertapenem  · Check cultures, baseline ESR, CRP  · Urine antigens  · Respiratory panel  · Consider bronchoscopy  · Will follow with you    Thank you for having us see this patient in consultation. Case discussed with Dr. Nima Barkley.     Maria Elena Cole MD  9:44 AM  7/5/2022

## 2022-07-05 NOTE — CONSULTS
Palliative Care Department  [phone]  Palliative Care Initial Consult  Provider PORSHA Plascencia CNP     Yenny Bedoya  [id number]  Hospital Day: 2  Date of Initial Consult: 7/5/2022  Referring Provider: PORSHA Farley CNP  Palliative Medicine was consulted for assistance with: Goals of Care    HPI:   Yenny Bedoya is a 66 y.o. with a medical history of HLD, GERD, hypothyroidism, depression, newly diagnosed lung CA who was admitted on 7/4/2022 from home with a CHIEF COMPLAINT of hemoptysis and shortness of breath x4 to 5 days. Patient was recently discharged from the hospital on 6/24 on 2 L nasal cannula. During that admission, she was admitted for new lung mass/carcinoma and postobstructive pneumonia. Patient was seen by her oncologist last week and was told that she has stage IV lung CA however pathology has not been resulted yet. Imaging significant for pleural effusions and pneumonia. Hemoglobin 6.2 in ED. Palliative medicine consulted for goals of care, patient known to palliative medicine team.    ASSESSMENT/PLAN:     Pertinent Hospital Diagnoses      Acute respiratory failure   Pleural effusions   Pneumonia   Anemia   Stage IV lung CA      Palliative Care Encounter / Counseling Regarding Goals of Care  Please see detailed goals of care discussion as below   At this time, Yenny Bedoya, Does have capacity for medical decision-making.   Capacity is time limited and situation/question specific   Outcome of goals of care meeting: Continue DNR CCA and current plan, continue with current symptom management regimen   Code status DNR-CCA   Advanced Directives: no POA or living will in [facility]   Surrogate/Legal NOK:  Heriberto Fleming, spouse, [phone], [phone]  o Nikki Gardner, child, [phone]      Neoplasm related pain:   -MS Contin extended release 30 mg twice daily   -Oxycodone immediate release 20 mg every 4 hours as needed: 3 doses in last 24 hours    Opioid-induced utilized a dose of Zofran last evening. States nausea is improving today and she was able to eat breakfast this morning. Continue current management at this time. Patient's daughter states that palliative medicine outpatient appointment is scheduled for July 18. Will follow. OBJECTIVE:   Prognosis: depends upon goals and unknown    Physical Exam:  BP (!) 107/54   Pulse 69   Temp 98.2 °F (36.8 °C) (Oral)   Resp 18   Ht 5' 4\" (1.626 m)   Wt 169 lb 14.4 oz (77.1 kg)   SpO2 92%   BMI 29.16 kg/m²   Constitutional:  Elderly, NAD, awake, alert  Eyes: no scleral icterus, normal lids, no discharge  ENMT:  Normocephalic, atraumatic, mucosa moist, EOMI  Neck:  trachea midline, no JVD  Lungs:  HFNC, CTA bilaterally, no audible rhonchi or wheezes noted, respirations unlabored, no retractions  Heart:  RRR, distant heart tones, no murmur, rub, or gallop noted during exam  Abd:  Soft, non tender, non distended, bowel sounds present  Ext:  Moving all extremities, no edema, pulses present  Skin:  Warm and dry  Neuro:   Alert, grossly nonfocal; following commands    Objective data reviewed: labs, images, records, medication use, vitals and chart    Discussed patient and the plan of care with the other IDT members: Palliative Medicine IDT Team    Time/Communication  Greater than 50% of time spent, total 70 minutes in counseling and coordination of care at the bedside regarding goals of care, symptom management, diagnosis and prognosis and see above. Thank you for allowing Palliative Medicine to participate in the care of Trudy Victoria.

## 2022-07-05 NOTE — PROGRESS NOTES
[address] [place], [place] [address]ist   Progress Note    Admitting Date and Time: 7/4/2022 10:31 AM  Admit Dx: Acute respiratory insufficiency [R06.89]  Acute respiratory failure with hypoxia (HCC) [J96.01]  Malignant neoplasm of lung, unspecified laterality, unspecified part of lung (HCC) [C34.90]  Anemia, unspecified type [D64.9]    Subjective:    Patient seen and examined. Daughter at bedside. Patient on 7 liters NC. States ambulated to bathroom, wasn't as short of breath s much as she was dizzy. Did cough up some blood this am. Reports back pain. Recently medicated for pain. ROS:   Denies CP, SOB, subjective fever, chills, N/V/D, abdominal pain,  HA. All systems reviewed and negative unless otherwise documented.       vancomycin  1,000 mg IntraVENous Q24H    cefepime  2,000 mg IntraVENous Q12H    benzonatate  100 mg Oral Daily    Vitamin D  5,000 Units Oral Daily    citalopram  20 mg Oral Daily    lactulose  30 mL Oral Daily    morphine  30 mg Oral BID    therapeutic multivitamin-minerals  1 tablet Oral Daily    pantoprazole  40 mg Oral QAM AC    sennosides-docusate sodium  2 tablet Oral BID    sodium chloride flush  5-40 mL IntraVENous 2 times per day    levothyroxine  25 mcg Oral Daily     sodium chloride, , PRN  ondansetron, 4 mg, Q8H PRN  oxyCODONE, 20 mg, Q4H PRN  sodium chloride flush, 5-40 mL, PRN  sodium chloride, , PRN  polyethylene glycol, 17 g, Daily PRN  acetaminophen, 650 mg, Q6H PRN   Or  acetaminophen, 650 mg, Q6H PRN  magnesium sulfate, 2,000 mg, PRN  potassium chloride, 40 mEq, PRN   Or  potassium alternative oral replacement, 40 mEq, PRN   Or  potassium chloride, 10 mEq, PRN         Objective:    BP (!) 127/58   Pulse 69   Temp 98.5 °F (36.9 °C) (Oral)   Resp 18   Ht 5' 4\" (1.626 m)   Wt 169 lb 14.4 oz (77.1 kg)   SpO2 93%   BMI 29.16 kg/m²   General Appearance: alert and oriented to person, place and time and in no acute distress  Skin: warm and dry  Head: normocephalic and atraumatic  Eyes: pupils equal, round, and reactive to light, extraocular eye movements intact, conjunctivae normal  Neck: neck supple and non tender without mass   Pulmonary/Chest: coarse crackles in pedro bases, normal air movement, no respiratory distress  Cardiovascular: normal rate, normal S1 and S2 and no carotid bruits  Abdomen: soft, non-tender, non-distended, normal bowel sounds, no masses or organomegaly  Extremities: no cyanosis, no clubbing and no edema  Neurologic: no cranial nerve deficit and speech normal      Recent Labs     07/04/22  1056 07/05/22  0036    135   K 3.9 4.4   CL 96* 98   CO2 27 26   BUN 10 10   CREATININE 0.5 0.5   GLUCOSE 105* 113*   CALCIUM 8.9 8.9       Recent Labs     07/04/22  1056 07/05/22  0036   ALKPHOS 67 64   PROT 6.4 6.2*   LABALBU 2.6* 2.3*   BILITOT 0.5 0.6   AST 21 23   ALT 10 13       Recent Labs     07/04/22  1056 07/04/22  1825 07/05/22  0036   WBC 12.3*  --  13.3*   RBC 2.35*  --  2.80*   HGB 6.2* 8.1* 7.5*   HCT 21.4* 26.6* 25.1*   MCV 91.1  --  89.6   MCH 26.4  --  26.8   MCHC 29.0*  --  29.9*   RDW 18.8*  --  17.6*   *  --  449   MPV 9.3  --  9.2         Radiology:   CTA PULMONARY W CONTRAST   Final Result   No central pulmonary embolism or aortic dissection. Extensive patchy and ground-glass infiltrates and pleural effusions   concerning for diffuse pneumonia and or edema. Multiple bilateral pulmonary masses and nodules as noted some of which are   necrotic concerning for malignancy. Superimposed complicated pneumonia is   also consideration. Please correlate with the pathology report from tissue   sampling. XR CHEST PORTABLE   Final Result   Worsening airspace consolidation on the right with confluence now present. Left lung mass with likely adjacent infiltrate and or atelectasis.            XR CHEST PORTABLE    Result Date: 7/4/2022  EXAMINATION: ONE XRAY VIEW OF THE CHEST 7/4/2022 9:42 am COMPARISON: Chest CT scan 21 June 2022 HISTORY: ORDERING SYSTEM PROVIDED HISTORY: shortness of breath TECHNOLOGIST PROVIDED HISTORY: Reason for exam:->shortness of breath FINDINGS: Notably worsening airspace consolidation in the right lung which is now confluent. A left lower lobe lung mass is redemonstrated and the borders are rather ill-defined suggesting that there is adjacent infiltrate and or atelectasis. Worsening airspace consolidation on the right with confluence now present. Left lung mass with likely adjacent infiltrate and or atelectasis. CTA PULMONARY W CONTRAST    Result Date: 7/4/2022  EXAMINATION: CTA OF THE CHEST 7/4/2022 1:06 pm TECHNIQUE: CTA of the chest was performed after the administration of intravenous contrast.  Multiplanar reformatted images are provided for review. MIP images are provided for review. Automated exposure control, iterative reconstruction, and/or weight based adjustment of the mA/kV was utilized to reduce the radiation dose to as low as reasonably achievable. COMPARISON: 06/21/2022 HISTORY: ORDERING SYSTEM PROVIDED HISTORY: shortness of breath TECHNOLOGIST PROVIDED HISTORY: Reason for exam:->shortness of breath Decision Support Exception - unselect if not a suspected or confirmed emergency medical condition->Emergency Medical Condition ([place]) FINDINGS: There is borderline cardiac size. The great vessels are normal.  Moderately enlarged mediastinal and hilar lymph nodes are present with lymph nodes measuring up to 1.6 cm in the AP window, paratracheal region, subcarinal region and pulmonary hilum. There are no filling defects in the main pulmonary artery and the central branches. There is diffuse multifocal patchy and ground-glass infiltrates bilaterally with moderate pleural effusions.   The previously noted bilateral pulmonary masses are identified with the larger 1 in the left lower lobe measuring 7.3 x 7.8 x 7 cm and larger 1 which is necrotic in the right upper lobe measuring 3.6 x 3. 2 cm. Nodules are identified in the left upper lobe some of which are cavitary with cavitary nodule measuring nearly 2.2 x 1.3 cm with additional smaller nodules measuring 5-6 mm. Liver is decreased in attenuation likely fatty. Gallbladder is distended. Consider ultrasonography. Peripelvic cysts are identified in the left kidney. A hiatal hernia is noted. No central pulmonary embolism or aortic dissection. Extensive patchy and ground-glass infiltrates and pleural effusions concerning for diffuse pneumonia and or edema. Multiple bilateral pulmonary masses and nodules as noted some of which are necrotic concerning for malignancy. Superimposed complicated pneumonia is also consideration. Please correlate with the pathology report from tissue sampling. Assessment:  Principal Problem:    Acute respiratory insufficiency  Active Problems:    Thyroid disease    Lung cancer ([address]    Hyperlipidemia LDL goal <100  Resolved Problems:    * No resolved hospital problems. *      Plan:  1. Acute respiratory insufficiency  -O2 sats 88% on 2.5 Liters NC now on 7 lietrs NC  to keep sats >93%  -wean as able to keep sats >92%  -CTA with pleural effusions, PNA  -has been seen by pulmonary, input appreciated.      2. Acute anemia  -hgb 6.2 on admission  -s/p 1 unit PRBC in ER 07/4/22  -trend H&H q12 and transfuse for Hgb <7.0  -consult oncology-await input     3.  Stage IV Lung cancer  -recently diagnosed June 2022  -consult oncology- Dr. Didi Chester  -continue home ms contin  - S/p IR guided biopsy on 6/21  -lytic lesion of the right pubic symphysis seen on CT abdomen prior admission  -palliative onsult     4. Recurrent PNA  -seen by ID on side bar last admission for post obstructive PNA, not treated with abx.   -procal this admit 0.12  - given dose vanc/cefepime in ER-continue for now until seen by ID  -was seen by ID last admission, will consult due to CTA with Extensive patchy and ground-glass infiltrates and pleural effusions, concerning for diffuse pneumonia and or edema  -afebrile, worsening leukocytosis     5.   Hypothyroid  -continue  Levothyroxine  -recent TSH 4.510     6. Opiate induced constipation  -continue  lactulose         Electronically signed by PORSHA Berger - CNP on 7/5/2022 at 7:48 AM

## 2022-07-06 LAB
ALBUMIN SERPL-MCNC: 2.5 G/DL (ref 3.5–5.2)
ALP BLD-CCNC: 73 U/L (ref 35–104)
ALT SERPL-CCNC: 21 U/L (ref 0–32)
ANION GAP SERPL CALCULATED.3IONS-SCNC: 14 MMOL/L (ref 7–16)
ANISOCYTOSIS: ABNORMAL
AST SERPL-CCNC: 39 U/L (ref 0–31)
BASOPHILIC STIPPLING: ABNORMAL
BASOPHILS ABSOLUTE: 0 E9/L (ref 0–0.2)
BASOPHILS RELATIVE PERCENT: 0 % (ref 0–2)
BILIRUB SERPL-MCNC: 0.5 MG/DL (ref 0–1.2)
BLOOD BANK DISPENSE STATUS: NORMAL
BLOOD BANK PRODUCT CODE: NORMAL
BPU ID: NORMAL
BUN BLDV-MCNC: 10 MG/DL (ref 6–23)
CALCIUM SERPL-MCNC: 9 MG/DL (ref 8.6–10.2)
CHLORIDE BLD-SCNC: 97 MMOL/L (ref 98–107)
CO2: 25 MMOL/L (ref 22–29)
CREAT SERPL-MCNC: 0.5 MG/DL (ref 0.5–1)
DESCRIPTION BLOOD BANK: NORMAL
EOSINOPHILS ABSOLUTE: 0.19 E9/L (ref 0.05–0.5)
EOSINOPHILS RELATIVE PERCENT: 1.7 % (ref 0–6)
GFR AFRICAN AMERICAN: >60
GFR NON-AFRICAN AMERICAN: >60 ML/MIN/1.73
GLUCOSE BLD-MCNC: 100 MG/DL (ref 74–99)
HCT VFR BLD CALC: 23.4 % (ref 34–48)
HCT VFR BLD CALC: 25.6 % (ref 34–48)
HEMOGLOBIN: 6.9 G/DL (ref 11.5–15.5)
HEMOGLOBIN: 7.4 G/DL (ref 11.5–15.5)
LYMPHOCYTES ABSOLUTE: 0.56 E9/L (ref 1.5–4)
LYMPHOCYTES RELATIVE PERCENT: 5.2 % (ref 20–42)
MCH RBC QN AUTO: 26.5 PG (ref 26–35)
MCHC RBC AUTO-ENTMCNC: 28.9 % (ref 32–34.5)
MCV RBC AUTO: 91.8 FL (ref 80–99.9)
METAMYELOCYTES RELATIVE PERCENT: 0.9 % (ref 0–1)
MONOCYTES ABSOLUTE: 0.56 E9/L (ref 0.1–0.95)
MONOCYTES RELATIVE PERCENT: 5.2 % (ref 2–12)
NEUTROPHILS ABSOLUTE: 9.74 E9/L (ref 1.8–7.3)
NEUTROPHILS RELATIVE PERCENT: 86.1 % (ref 43–80)
NUCLEATED RED BLOOD CELLS: 0.9 /100 WBC
OVALOCYTES: ABNORMAL
PDW BLD-RTO: 17.9 FL (ref 11.5–15)
PLATELET # BLD: 458 E9/L (ref 130–450)
PMV BLD AUTO: 9.8 FL (ref 7–12)
POIKILOCYTES: ABNORMAL
POLYCHROMASIA: ABNORMAL
POTASSIUM REFLEX MAGNESIUM: 3.9 MMOL/L (ref 3.5–5)
PROMYELOCYTES PERCENT: 0.9 % (ref 0–0)
RBC # BLD: 2.79 E12/L (ref 3.5–5.5)
REASON FOR REJECTION: NORMAL
REJECTED TEST: NORMAL
SODIUM BLD-SCNC: 136 MMOL/L (ref 132–146)
STOMATOCYTES: ABNORMAL
TOTAL PROTEIN: 6.3 G/DL (ref 6.4–8.3)
WBC # BLD: 11.2 E9/L (ref 4.5–11.5)

## 2022-07-06 PROCEDURE — 80053 COMPREHEN METABOLIC PANEL: CPT

## 2022-07-06 PROCEDURE — 6370000000 HC RX 637 (ALT 250 FOR IP): Performed by: NURSE PRACTITIONER

## 2022-07-06 PROCEDURE — 6370000000 HC RX 637 (ALT 250 FOR IP): Performed by: STUDENT IN AN ORGANIZED HEALTH CARE EDUCATION/TRAINING PROGRAM

## 2022-07-06 PROCEDURE — 2580000003 HC RX 258: Performed by: NURSE PRACTITIONER

## 2022-07-06 PROCEDURE — 99232 SBSQ HOSP IP/OBS MODERATE 35: CPT | Performed by: NURSE PRACTITIONER

## 2022-07-06 PROCEDURE — 94640 AIRWAY INHALATION TREATMENT: CPT

## 2022-07-06 PROCEDURE — 2580000003 HC RX 258: Performed by: SPECIALIST

## 2022-07-06 PROCEDURE — 36430 TRANSFUSION BLD/BLD COMPNT: CPT

## 2022-07-06 PROCEDURE — 1200000000 HC SEMI PRIVATE

## 2022-07-06 PROCEDURE — 99233 SBSQ HOSP IP/OBS HIGH 50: CPT | Performed by: STUDENT IN AN ORGANIZED HEALTH CARE EDUCATION/TRAINING PROGRAM

## 2022-07-06 PROCEDURE — 6360000002 HC RX W HCPCS: Performed by: NURSE PRACTITIONER

## 2022-07-06 PROCEDURE — 36415 COLL VENOUS BLD VENIPUNCTURE: CPT

## 2022-07-06 PROCEDURE — 85014 HEMATOCRIT: CPT

## 2022-07-06 PROCEDURE — 85018 HEMOGLOBIN: CPT

## 2022-07-06 PROCEDURE — 87449 NOS EACH ORGANISM AG IA: CPT

## 2022-07-06 PROCEDURE — APPSS30 APP SPLIT SHARED TIME 16-30 MINUTES: Performed by: NURSE PRACTITIONER

## 2022-07-06 PROCEDURE — A4216 STERILE WATER/SALINE, 10 ML: HCPCS | Performed by: SPECIALIST

## 2022-07-06 PROCEDURE — 6370000000 HC RX 637 (ALT 250 FOR IP)

## 2022-07-06 PROCEDURE — 85025 COMPLETE CBC W/AUTO DIFF WBC: CPT

## 2022-07-06 PROCEDURE — 2700000000 HC OXYGEN THERAPY PER DAY

## 2022-07-06 PROCEDURE — 6360000002 HC RX W HCPCS: Performed by: SPECIALIST

## 2022-07-06 RX ORDER — SODIUM CHLORIDE 9 MG/ML
INJECTION, SOLUTION INTRAVENOUS PRN
Status: DISCONTINUED | OUTPATIENT
Start: 2022-07-06 | End: 2022-07-07 | Stop reason: HOSPADM

## 2022-07-06 RX ORDER — PROCHLORPERAZINE MALEATE 5 MG/1
5 TABLET ORAL EVERY 6 HOURS PRN
Status: DISCONTINUED | OUTPATIENT
Start: 2022-07-06 | End: 2022-07-07 | Stop reason: HOSPADM

## 2022-07-06 RX ORDER — BENZONATATE 100 MG/1
100 CAPSULE ORAL 2 TIMES DAILY
Status: DISCONTINUED | OUTPATIENT
Start: 2022-07-06 | End: 2022-07-07 | Stop reason: HOSPADM

## 2022-07-06 RX ADMIN — GUAIFENESIN 400 MG: 400 TABLET ORAL at 10:58

## 2022-07-06 RX ADMIN — ERTAPENEM SODIUM 1000 MG: 1 INJECTION, POWDER, LYOPHILIZED, FOR SOLUTION INTRAMUSCULAR; INTRAVENOUS at 16:31

## 2022-07-06 RX ADMIN — OXYCODONE 20 MG: 5 TABLET ORAL at 16:27

## 2022-07-06 RX ADMIN — GUAIFENESIN 400 MG: 400 TABLET ORAL at 16:29

## 2022-07-06 RX ADMIN — DOCUSATE SODIUM 50 MG AND SENNOSIDES 8.6 MG 2 TABLET: 8.6; 5 TABLET, FILM COATED ORAL at 10:58

## 2022-07-06 RX ADMIN — CITALOPRAM HYDROBROMIDE 20 MG: 20 TABLET ORAL at 10:57

## 2022-07-06 RX ADMIN — ONDANSETRON 4 MG: 4 TABLET, ORALLY DISINTEGRATING ORAL at 08:19

## 2022-07-06 RX ADMIN — MULTIPLE VITAMINS W/ MINERALS TAB 1 TABLET: TAB at 10:58

## 2022-07-06 RX ADMIN — ALBUTEROL SULFATE 0.63 MG: 0.63 SOLUTION RESPIRATORY (INHALATION) at 11:26

## 2022-07-06 RX ADMIN — ALBUTEROL SULFATE 0.63 MG: 0.63 SOLUTION RESPIRATORY (INHALATION) at 07:26

## 2022-07-06 RX ADMIN — DOCUSATE SODIUM 50 MG AND SENNOSIDES 8.6 MG 2 TABLET: 8.6; 5 TABLET, FILM COATED ORAL at 21:08

## 2022-07-06 RX ADMIN — LEVOTHYROXINE SODIUM 25 MCG: 25 TABLET ORAL at 21:09

## 2022-07-06 RX ADMIN — MORPHINE SULFATE 30 MG: 30 TABLET, FILM COATED, EXTENDED RELEASE ORAL at 08:25

## 2022-07-06 RX ADMIN — MORPHINE SULFATE 30 MG: 30 TABLET, FILM COATED, EXTENDED RELEASE ORAL at 21:09

## 2022-07-06 RX ADMIN — BENZONATATE 100 MG: 100 CAPSULE ORAL at 10:57

## 2022-07-06 RX ADMIN — Medication 5000 UNITS: at 10:57

## 2022-07-06 RX ADMIN — OXYCODONE 20 MG: 5 TABLET ORAL at 04:43

## 2022-07-06 RX ADMIN — Medication 1 CAPSULE: at 10:58

## 2022-07-06 RX ADMIN — BENZONATATE 100 MG: 100 CAPSULE ORAL at 21:09

## 2022-07-06 RX ADMIN — ALBUTEROL SULFATE 0.63 MG: 0.63 SOLUTION RESPIRATORY (INHALATION) at 20:21

## 2022-07-06 RX ADMIN — SODIUM CHLORIDE, PRESERVATIVE FREE 10 ML: 5 INJECTION INTRAVENOUS at 21:09

## 2022-07-06 RX ADMIN — LACTULOSE 20 G: 20 SOLUTION ORAL at 16:43

## 2022-07-06 RX ADMIN — OXYCODONE 20 MG: 5 TABLET ORAL at 12:26

## 2022-07-06 RX ADMIN — PROCHLORPERAZINE MALEATE 5 MG: 5 TABLET ORAL at 17:04

## 2022-07-06 RX ADMIN — GUAIFENESIN 400 MG: 400 TABLET ORAL at 21:09

## 2022-07-06 RX ADMIN — SODIUM CHLORIDE, PRESERVATIVE FREE 10 ML: 5 INJECTION INTRAVENOUS at 10:59

## 2022-07-06 RX ADMIN — PANTOPRAZOLE SODIUM 40 MG: 40 TABLET, DELAYED RELEASE ORAL at 05:13

## 2022-07-06 ASSESSMENT — PAIN DESCRIPTION - ONSET
ONSET: ON-GOING
ONSET: ON-GOING

## 2022-07-06 ASSESSMENT — PAIN DESCRIPTION - ORIENTATION
ORIENTATION: RIGHT;OUTER
ORIENTATION: RIGHT
ORIENTATION: RIGHT;MID
ORIENTATION: RIGHT
ORIENTATION: RIGHT;OUTER

## 2022-07-06 ASSESSMENT — PAIN DESCRIPTION - PAIN TYPE
TYPE: CHRONIC PAIN
TYPE: CHRONIC PAIN

## 2022-07-06 ASSESSMENT — PAIN DESCRIPTION - LOCATION
LOCATION: ABDOMEN;BACK
LOCATION: BACK
LOCATION: LEG;BACK
LOCATION: BACK;LEG
LOCATION: BACK;ABDOMEN

## 2022-07-06 ASSESSMENT — PAIN DESCRIPTION - DESCRIPTORS
DESCRIPTORS: SHARP
DESCRIPTORS: ACHING;DISCOMFORT
DESCRIPTORS: ACHING;SHARP
DESCRIPTORS: SHARP
DESCRIPTORS: ACHING;SPASM

## 2022-07-06 ASSESSMENT — PAIN SCALES - GENERAL
PAINLEVEL_OUTOF10: 5
PAINLEVEL_OUTOF10: 4
PAINLEVEL_OUTOF10: 5
PAINLEVEL_OUTOF10: 5
PAINLEVEL_OUTOF10: 4
PAINLEVEL_OUTOF10: 7

## 2022-07-06 ASSESSMENT — PAIN - FUNCTIONAL ASSESSMENT
PAIN_FUNCTIONAL_ASSESSMENT: PREVENTS OR INTERFERES SOME ACTIVE ACTIVITIES AND ADLS

## 2022-07-06 ASSESSMENT — PAIN SCALES - WONG BAKER: WONGBAKER_NUMERICALRESPONSE: 0

## 2022-07-06 ASSESSMENT — PAIN DESCRIPTION - FREQUENCY
FREQUENCY: CONTINUOUS
FREQUENCY: CONTINUOUS

## 2022-07-06 NOTE — PROGRESS NOTES
Comprehensive Nutrition Assessment    Type and Reason for Visit:  Initial,Positive Nutrition Screen    Nutrition Recommendations/Plan:   1. Continue current diet and ONS, as tolerated     Malnutrition Assessment:  Malnutrition Status: At risk for malnutrition (Comment) (07/06/22 4416)    Context:  Acute Illness (newly dx lung CA)     Findings of the 6 clinical characteristics of malnutrition:  Energy Intake:  50% or less of estimated energy requirements for 5 or more days  Weight Loss:  No significant weight loss     Body Fat Loss:  No significant body fat loss     Muscle Mass Loss:  No significant muscle mass loss    Fluid Accumulation:  No significant fluid accumulation     Strength:  Not Performed    Nutrition Assessment:    Pt admit 2/2 respiratory failure PNA 2/2  to obstruction probably from lung cancer. Pt dx adenocarcinoma of lung but path pending on poss mets bx. 2 wks ago, was admit d/t GIB and had transfusion. Pt has been seen by Hospice but not currently under their care. Will add ONS to all meals to optimize nutrient intake and continue to monitor pt tolerance    Nutrition Related Findings:    on HFNC, no edema, missing teeth, decreased appetite, constipation(d/t pain meds), I/O WNL Wound Type: None       Current Nutrition Intake & Therapies:    Average Meal Intake: 1-25%  Average Supplements Intake: None Ordered  ADULT DIET; Regular    Anthropometric Measures:  Height: 5' 4\" (162.6 cm)  Ideal Body Weight (IBW): 120 lbs (55 kg)    Admission Body Weight: 169 lb 14.4 oz (77.1 kg) (7/5 bedscale)  Current Body Weight: 166 lb 1.6 oz (75.3 kg), 138.4 % IBW. Weight Source: Bed Scale (7/6)  Current BMI (kg/m2): 28.5  Usual Body Weight: 172 lb (78 kg) (at OV x 3 mo ago)  % Weight Change (Calculated): -3.4                    BMI Categories: Overweight (BMI 25.0-29. 9)    Estimated Daily Nutrient Needs:  Energy Requirements Based On: Formula (Chad Grullon)  Weight Used for Energy Requirements: Current  Energy (kcal/day):   Weight Used for Protein Requirements: Ideal  Protein (g/day): 70-80 (1.3-1.5 g/kg)  Method Used for Fluid Requirements: 1 ml/kcal  Fluid (ml/day):     Nutrition Diagnosis:   · Inadequate oral intake related to impaired respiratory function as evidenced by poor intake prior to admission,intake 0-25%      Nutrition Interventions:   Food and/or Nutrient Delivery: Continue Current Diet,Start Oral Nutrition Supplement (Magic cup BID, Ensure Clear once daily)  Nutrition Education/Counseling: No recommendation at this time  Coordination of Nutrition Care: Continue to monitor while inpatient       Goals:     Goals: PO intake 50% or greater       Nutrition Monitoring and Evaluation:   Behavioral-Environmental Outcomes: None Identified  Food/Nutrient Intake Outcomes: Food and Nutrient Intake,Supplement Intake  Physical Signs/Symptoms Outcomes: Biochemical Data,GI Status,Nausea or Vomiting,Constipation,Fluid Status or Edema,Nutrition Focused Physical Findings,Skin,Weight    Discharge Planning:    Continue Oral Nutrition Supplement     Anika cMcauley RD, [address] [place] , [place]  Contact: [phone]

## 2022-07-06 NOTE — PROGRESS NOTES
[address] [place] [address] [facility]  DAVIDIDA  Progress Note    SUBJECTIVE:  Chief Complaint   Patient presents with    Shortness of Breath     uses home O2, has lung CA    Hemoptysis     Patient is tolerating medications. No reported adverse drug reactions. Had some nausea this morning but says it is better  Using JUMA JURADO [facility] - + cough 7LNC  Family members at bedside   No fevers. Review of systems:  As stated above in the chief complaint, otherwise negative. Medications:  Scheduled Meds:   [START ON 2022] pantoprazole (PROTONIX) 40 mg injection  40 mg IntraVENous Daily    albuterol  0.63 mg Nebulization 4x daily    guaiFENesin  400 mg Oral TID    ertapenem (INVanz) IVPB  1,000 mg IntraVENous Q24H    lactobacillus  1 capsule Oral Daily    benzonatate  100 mg Oral Daily    Vitamin D  5,000 Units Oral Daily    citalopram  20 mg Oral Daily    lactulose  30 mL Oral Daily    morphine  30 mg Oral BID    therapeutic multivitamin-minerals  1 tablet Oral Daily    sennosides-docusate sodium  2 tablet Oral BID    sodium chloride flush  5-40 mL IntraVENous 2 times per day    levothyroxine  25 mcg Oral Daily     Continuous Infusions:   sodium chloride      sodium chloride       PRN Meds:sodium chloride, ondansetron, oxyCODONE, sodium chloride flush, sodium chloride, polyethylene glycol, acetaminophen **OR** acetaminophen, magnesium sulfate, potassium chloride **OR** potassium alternative oral replacement **OR** potassium chloride    OBJECTIVE:  /60   Pulse 74   Temp 97.8 °F (36.6 °C) (Oral)   Resp 18   Ht 5' 4\" (1.626 m)   Wt 166 lb 1.6 oz (75.3 kg)   SpO2 91%   BMI 28.51 kg/m²   Temp  Av.5 °F (36.9 °C)  Min: 97.8 °F (36.6 °C)  Max: 98.8 °F (37.1 °C)  Constitutional: The patient is awake, alert, and oriented. Family members present   Skin: Warm and dry. No rashes were noted. Pale   HEENT: Round and reactive pupils. Moist mucous membranes. No ulcerations or thrush.   Neck: Supple to movements. Chest: No use of accessory muscles to breathe. Symmetrical expansion. Right sided crackles upper and lower lobes. Cardiovascular: S1 and S2 are rhythmic and regular. No murmurs appreciated. Abdomen: Positive bowel sounds to auscultation. Benign to palpation. No masses felt. Extremities: No edema.   Lines: peripheral    Laboratory and Tests Review:  Lab Results   Component Value Date    WBC 11.2 07/06/2022    WBC 13.3 (H) 07/05/2022    WBC 12.3 (H) 07/04/2022    HGB 7.4 (L) 07/06/2022    HCT 25.6 (L) 07/06/2022    MCV 91.8 07/06/2022     (H) 07/06/2022     Lab Results   Component Value Date    NEUTROABS 9.74 (H) 07/06/2022    NEUTROABS 9.94 (H) 07/05/2022    NEUTROABS 9.71 (H) 07/04/2022     No results found for: [facility]  Lab Results   Component Value Date    ALT 21 07/06/2022    AST 39 (H) 07/06/2022    ALKPHOS 73 07/06/2022    BILITOT 0.5 07/06/2022     Lab Results   Component Value Date/Time     07/06/2022 05:07 AM    K 3.9 07/06/2022 05:07 AM    CL 97 07/06/2022 05:07 AM    CO2 25 07/06/2022 05:07 AM    BUN 10 07/06/2022 05:07 AM    CREATININE 0.5 07/06/2022 05:07 AM    CREATININE 0.5 07/05/2022 12:36 AM    CREATININE 0.5 07/04/2022 10:56 AM    GFRAA >60 07/06/2022 05:07 AM    LABGLOM >60 07/06/2022 05:07 AM    GLUCOSE 100 07/06/2022 05:07 AM    GLUCOSE 83 05/29/2012 11:10 AM    PROT 6.3 07/06/2022 05:07 AM    LABALBU 2.5 07/06/2022 05:07 AM    LABALBU 4.5 05/29/2012 11:10 AM    CALCIUM 9.0 07/06/2022 05:07 AM    BILITOT 0.5 07/06/2022 05:07 AM    ALKPHOS 73 07/06/2022 05:07 AM    AST 39 07/06/2022 05:07 AM    ALT 21 07/06/2022 05:07 AM     Lab Results   Component Value Date    CRP 19.5 (H) 07/05/2022    CRP 14.3 (H) 06/23/2022    CRP 4.9 (H) 04/28/2022     Lab Results   Component Value Date    SEDRATE 104 (H) 07/05/2022    SEDRATE 82 (H) 04/28/2022    SEDRATE 13 08/06/2019     Radiology:  Reviewed     Microbiology:   Blood cultures: negative so far  Respiratory Panel: negative  Streptococcus pneumoniae/Legionella urine Ag: pending    Recent Labs     07/04/22  1056   PROCAL 0.12*       ASSESSMENT:  · Adenocarcinoma of the lung with metastasis to symphysis pubis  · Probable postobstructive pneumonia  · Leukocytosis, improved    PLAN:  · Continue Ertapenem   · Patient has requested a hospice consult  · Pulmonary following - no plans for bronch at this time   · Labs and cultures reviewed  · We will follow with you     Angie Yi, APRN - CNP  11:00 AM  7/6/2022     Patient seen and examined. I had a face to face encounter with the patient. Agree with exam.  Assessment and plan as outlined above and directed by me. Addition and corrections were done as deemed appropriate. My exam and plan include: Patient is doing slightly better. The pain is under control. She has requested to have a conversation with hospice. I think this is quite appropriate. She does understand, however, that if she is excepted to hospice that the antibiotics will be discontinued altogether.     Yulia Zapata MD  7/6/2022  1:50 PM

## 2022-07-06 NOTE — PROGRESS NOTES
Pulmonary Progress Note    Admit Date: 2022                            PCP: Cari Bravo DO  Principal Problem:    Acute respiratory insufficiency  Active Problems:    Thyroid disease    Lung cancer ([address]    Hyperlipidemia LDL goal <100  Resolved Problems:    * No resolved hospital problems. *      Subjective:  Patient was seen resting in bed on 7L HFNC   She has an occasional cough and hemoptysis. She feels as though she can't cough mucous up. Using IS  She denies breathing problems but c/o nausea     Medications:   sodium chloride      sodium chloride          [START ON 2022] pantoprazole (PROTONIX) 40 mg injection  40 mg IntraVENous Daily    albuterol  0.63 mg Nebulization 4x daily    guaiFENesin  400 mg Oral TID    ertapenem (INVanz) IVPB  1,000 mg IntraVENous Q24H    lactobacillus  1 capsule Oral Daily    benzonatate  100 mg Oral Daily    Vitamin D  5,000 Units Oral Daily    citalopram  20 mg Oral Daily    lactulose  30 mL Oral Daily    morphine  30 mg Oral BID    therapeutic multivitamin-minerals  1 tablet Oral Daily    sennosides-docusate sodium  2 tablet Oral BID    sodium chloride flush  5-40 mL IntraVENous 2 times per day    levothyroxine  25 mcg Oral Daily       Vitals:  VITALS:  /60   Pulse 74   Temp 97.8 °F (36.6 °C) (Oral)   Resp 18   Ht 5' 4\" (1.626 m)   Wt 166 lb 1.6 oz (75.3 kg)   SpO2 91%   BMI 28.51 kg/m²   24HR INTAKE/OUTPUT:      Intake/Output Summary (Last 24 hours) at 2022 1104  Last data filed at 2022 0758  Gross per 24 hour   Intake 250 ml   Output 250 ml   Net 0 ml     CURRENT PULSE OXIMETRY:  SpO2: 91 %  24HR PULSE OXIMETRY RANGE:  SpO2  Av %  Min: 91 %  Max: 93 %  CVP:    VENT SETTINGS:      Additional Respiratory Assessments  Heart Rate: 74  Resp: 18  SpO2: 91 %      EXAM:  General: No distress. Alert. Eyes:  No sclera icterus. No conjunctival injection. ENT: No discharge. Pharynx clear. Neck: Trachea midline.  Normal adjacent infiltrate and or atelectasis. Worsening airspace consolidation on the right with confluence now present. Left lung mass with likely adjacent infiltrate and or atelectasis. 7/4 6/21       CTA PULMONARY W CONTRAST    Result Date: 7/4/2022  EXAMINATION: CTA OF THE CHEST 7/4/2022 1:06 pm TECHNIQUE: CTA of the chest was performed after the administration of intravenous contrast.  Multiplanar reformatted images are provided for review. MIP images are provided for review. Automated exposure control, iterative reconstruction, and/or weight based adjustment of the mA/kV was utilized to reduce the radiation dose to as low as reasonably achievable. COMPARISON: 06/21/2022 HISTORY: ORDERING SYSTEM PROVIDED HISTORY: shortness of breath TECHNOLOGIST PROVIDED HISTORY: Reason for exam:->shortness of breath Decision Support Exception - unselect if not a suspected or confirmed emergency medical condition->Emergency Medical Condition ([place]) FINDINGS: There is borderline cardiac size. The great vessels are normal.  Moderately enlarged mediastinal and hilar lymph nodes are present with lymph nodes measuring up to 1.6 cm in the AP window, paratracheal region, subcarinal region and pulmonary hilum. There are no filling defects in the main pulmonary artery and the central branches. There is diffuse multifocal patchy and ground-glass infiltrates bilaterally with moderate pleural effusions. The previously noted bilateral pulmonary masses are identified with the larger 1 in the left lower lobe measuring 7.3 x 7.8 x 7 cm and larger 1 which is necrotic in the right upper lobe measuring 3.6 x 3.2 cm. Nodules are identified in the left upper lobe some of which are cavitary with cavitary nodule measuring nearly 2.2 x 1.3 cm with additional smaller nodules measuring 5-6 mm. Liver is decreased in attenuation likely fatty. Gallbladder is distended. Consider ultrasonography. Peripelvic cysts are identified in the left kidney. A hiatal hernia is noted. No central pulmonary embolism or aortic dissection. Extensive patchy and ground-glass infiltrates and pleural effusions concerning for diffuse pneumonia and or edema. Multiple bilateral pulmonary masses and nodules as noted some of which are necrotic concerning for malignancy. Superimposed complicated pneumonia is also consideration. Please correlate with the pathology report from tissue sampling.         06/20/22 CT ABD/PELVIS: Airspace disease identified lower lung fields bilaterally.  There is stool seen scattered throughout the colon to suggest clinical presentation of constipation.   Stable abnormal appearance of the right pubic symphysis with lytic lesion identified concerning for malignancy or metastatic disease. Assessment:  · Acute respiratory failure  · Adenocarcinoma of the lung, path 06/21- probable metastatic  · Pneumonia - ?post obstructive  · Bilateral pleural effusions  · Hypothyroid   · HDL  · Arthritis      Plan:  · Continue O2, currently on 7L HFNC with SpO2 91% . Wean to keep pox >92%. Was recently d/c'd on 2L. · Known recent  lung ca - cannot differentiate between primary or metastatic at this time. Path on 06/21 with adeno. Additional path results pending. Pt with B pleural effusion >L, most likely will watch for now. · Still with mild hemoptysis, if worsens may consider bronchoscopy   · Currently on Ertapenam per ID. PCT 0.12, WBC 13.3>11. 2. afebrile   · Respiratory panel negative. Legionella, strep pneumoniae pending, sputum culture pending. · Oncology ordered PDL-1 path which is pending   · Continue tessalon for cough, changed to BID   · Continue mucinex, albuterol and flutter valve to enhance expectoration   · DNR CCA.  Patient requested hospice regarding end of life care        Electronically signed by PORSHA Palomo CNP on 7/6/2022 at 11:04 AM    Seen and evaluated, and agree with above assessment and plan  Antibiotic switched to ertapenem by ID  If hemoptysis persist or no improvement in respiratory symptoms consider bronchoscopy, however concern with worsening hypoxemia could be an issue. Patient currently under hospice evaluation.

## 2022-07-06 NOTE — PROGRESS NOTES
HOSPICE [facility]     Liaison Information Visit Note            Patient Name: Jarod Luna    Terminal Diagnosis Metastatic cancer  Code Status Order: DNR-CCA   Allergies:  Atorvastatin, Avelox [moxifloxacin], Codeine, Daypro [oxaprozin], Demerol hcl [meperidine], Levaquin [levofloxacin in d5w], Tape [adhesive tape], and Valium [diazepam]  Family Goal: Comfort Care  Meeting held with Dion Hendrix (patient), Mary Amaya (), Sukh Maravilla (daughter), and Marden Scheuermann (son)    The hospice benefit and philosophy were explained including that hospice is end of life care in which, per Medicare, a patient has a terminal diagnosis that life expectancy would be 6 months or less. Hospice care is a service that is covered by most insurance plans. Explained hospice services at home, at [facility] with room/board private pay unless patient has Medicaid and the [facility]. Explained that once in hospice care, all aggressive treatments would be stopped and allow nature to takes its course with focus on comfort care for the patient. All questions were answered. Dion Hendrix is having uncontrolled pain and sometimes feels \"loopy\" if medications are given too close together. She stated it is hard to balance pain control and being alert. Family to discuss hospice. Brochures given and this nurse cell number as well as [facility] main number to call with questions. Discharge Plan:  Discharge Disposition; undecided  [facility] plan:  1. Follow-up tomorrow 7/7/22  2. Please call Giovany Grimaldo [phone] with any questions. 3. Patient not currently under the care of hospice.     Electronically signed by Deri Meckel, RN on 7/6/2022 at 3:45 PM

## 2022-07-06 NOTE — PLAN OF CARE
Problem: Discharge Planning  Goal: Discharge to home or other facility with appropriate resources  Outcome: Progressing  Flowsheets (Taken 7/6/2022 0815)  Discharge to home or other facility with appropriate resources: Identify barriers to discharge with patient and caregiver

## 2022-07-06 NOTE — PROGRESS NOTES
[facility] [place]    Referral received. Chart reviewed. Message received requested this nurse call daughter Vicente Rodriguez to set up a family meeting. Spoke with Vicente Rodriguez. Plan to meet at 1500 today.      Electronically signed by Clearence Barthel, RN on 7/6/2022 at 1:35 PM   [phone]

## 2022-07-06 NOTE — PROGRESS NOTES
Physician Progress Note      PATIENT:               Shelton Munguia  CSN #:                  [id number]  :                       1950  ADMIT DATE:       2022 10:31 AM  DISCH DATE:  RESPONDING  PROVIDER #:        Kimi Garland MD          QUERY TEXT:    Pt admitted with pneumonia and has anemia documented. If possible, please   document in progress notes and discharge summary further specificity regarding   the acuity and type of anemia:    The medical record reflects the following:  Risk Factors: lung CA, hemoptysis  Clinical Indicators: on admission H&H 6.2/21.4, transfused, post transfusion   8.1/26. 6. Per  and  PN \"Ac Anemia  - in the settings of hemoptysis - s/p   1 unit B.T, hb 6.2 on presentation. \"  Oncology consult \"Anemia: Likely due to anemia of chronic inflammation plus   blood loss from hemoptysis. \"  Treatment: transfuse, iron studies, monitor H&H    Thank you,  Orestes Trevino RN, CCDS  Clinical Documentation Improvement Specialist  [email or web address]  Options provided:  -- Anemia due to acute blood loss secondary to hemoptysis  -- Anemia due to lung CA  -- Other - I will add my own diagnosis  -- Disagree - Not applicable / Not valid  -- Disagree - Clinically unable to determine / Unknown  -- Refer to Clinical Documentation Reviewer    PROVIDER RESPONSE TEXT:    This patient has acute blood loss anemia secondary to hemoptysis.     Query created by: Yulia Mcgovern on 2022 2:16 PM      Electronically signed by:  Kimi Garland MD 2022 6:25 PM

## 2022-07-06 NOTE — CARE COORDINATION
Social Work:    Social service received a medical update this a.m., as well as hospice consult. Social service spoke with patient's daughter Janna Worthy, a respiratory therapist employed at [address] [place] [address] [address] Janna Worthy advised that she helps her mom with planning and confirmed Mrs. Nagel desire to meet with hospice. Social work provided hospice choices and preference is [facility]. Social work called a referral in to [facility] today and will follow-up with Mrs. Hernández Mealing family thereafter to confirm discharge plans.     Electronically signed by CHAR Bejarano on 7/6/2022 at 1:09 PM

## 2022-07-06 NOTE — PROGRESS NOTES
Palliative Care Department  [phone]  Palliative Care Progress Note  Provider PORSHA Baker CNP     Terrence Prieto  [id number]  Hospital Day: 3  Date of Initial Consult: 7/5/2022  Referring Provider: PORSHA Gore CNP  Palliative Medicine was consulted for assistance with: Goals of Care    HPI:   Terrence Prieto is a 66 y.o. with a medical history of HLD, GERD, hypothyroidism, depression, newly diagnosed lung CA who was admitted on 7/4/2022 from home with a CHIEF COMPLAINT of hemoptysis and shortness of breath x4 to 5 days. Patient was recently discharged from the hospital on 6/24 on 2 L nasal cannula. During that admission, she was admitted for new lung mass/carcinoma and postobstructive pneumonia. Patient was seen by her oncologist last week and was told that she has stage IV lung CA however pathology has not been resulted yet. Imaging significant for pleural effusions and pneumonia. Hemoglobin 6.2 in ED. Palliative medicine consulted for goals of care, patient known to palliative medicine team.    ASSESSMENT/PLAN:     Pertinent Hospital Diagnoses      Acute respiratory failure   Pleural effusions   Pneumonia   Anemia   Stage IV lung CA      Palliative Care Encounter / Counseling Regarding Goals of Care  Please see detailed goals of care discussion as below   At this time, Terrence Prieto, Does have capacity for medical decision-making.   Capacity is time limited and situation/question specific   Outcome of goals of care meeting: Continue DNR CCA and current plan, continue with current symptom management regimen   Code status DNR-CCA   Advanced Directives: no POA or living will in [facility]   Surrogate/Legal NOK:  Lion Leighton, spouse, [phone], [phone]  o Tania Dooley, child, [phone]      Neoplasm related pain:   -MS Contin extended release 30 mg twice daily   -Oxycodone immediate release 20 mg every 4 hours as needed: 4 doses in last 24 hours    Opioid-induced constipation:   -Patient reports 4 bowel movements yesterday, has not had a bowel movement yet today   -Lactulose daily   -Senokot-S 2 tablets twice daily    Nausea and vomiting:   -Ondansetron 4 mg every 8 hours as needed: 1 dose in last 24 hours--discontinue   -Prochlorperazine 5 mg every 6 hours as needed     Spiritual assessment: no spiritual distress identified  Bereavement and grief: to be determined  Referrals to: none today  SUBJECTIVE:     Current medical issues leading to Palliative Medicine involvement include   Active Hospital Problems    Diagnosis Date Noted    Acute respiratory insufficiency [R06.89] 07/04/2022     Priority: Medium    Lung cancer ([facility] [address] [C34.90] 07/04/2022     Priority: Medium    Thyroid disease [E07.9] 06/20/2022     Priority: Medium    Hyperlipidemia LDL goal <100 [E78.5] 02/20/2018       Details of Conversation: Chart reviewed and patient seen. Patient is in bed eating lunch, sister is at the bedside. Patient states that her symptoms have been well controlled. States that pain has been well managed. Currently rates pain 6/10 however she is due for dose of Oxy IR in 15 minutes. She states Oxy IR brings her pain down to a 4/5 which is tolerable for her. Patient had 4 bowel movements yesterday, she has not had one yet today. Patient states that she did experience some nausea this morning however is feeling better and is currently eating lunch. Patient states that she has been thinking about goals of care and is requesting a hospice consult. Patient states that she does not think that she wants to proceed with chemotherapy and wishes to focus on symptoms/quality of life rather than treatment. Patient states that she has watched multiple family members go through chemotherapy and she is not sure if that is what she wants. Support provided and questions answered. Hospice is following.     OBJECTIVE:   Prognosis: depends upon goals and unknown    Physical Exam:  /60 Pulse 76   Temp 97.8 °F (36.6 °C) (Oral)   Resp 18   Ht 5' 4\" (1.626 m)   Wt 166 lb 1.6 oz (75.3 kg)   SpO2 93%   BMI 28.51 kg/m²   Constitutional:  Elderly, NAD, awake, alert  Eyes: no scleral icterus, normal lids, no discharge  ENMT:  Normocephalic, atraumatic, mucosa moist, EOMI  Neck:  trachea midline, no JVD  Lungs:  HFNC, CTA bilaterally, no audible rhonchi or wheezes noted, respirations unlabored, no retractions  Heart:  RRR, distant heart tones, no murmur, rub, or gallop noted during exam  Abd:  Soft, non tender, non distended, bowel sounds present  Ext:  Moving all extremities, no edema, pulses present  Skin:  Warm and dry  Neuro:   Alert, grossly nonfocal; following commands    Objective data reviewed: labs, images, records, medication use, vitals and chart    Discussed patient and the plan of care with the other IDT members: Palliative Medicine IDT Team    Time/Communication  Greater than 50% of time spent, total 25 minutes in counseling and coordination of care at the bedside regarding goals of care, symptom management, diagnosis and prognosis and see above. Thank you for allowing Palliative Medicine to participate in the care of Sarah Messina

## 2022-07-06 NOTE — PROGRESS NOTES
[address] [place], [place] [address]ist   Progress Note    Admitting Date and Time: 7/4/2022 10:31 AM  Admit Dx: Acute respiratory insufficiency [R06.89]  Acute respiratory failure with hypoxia (HCC) [J96.01]  Malignant neoplasm of lung, unspecified laterality, unspecified part of lung (HCC) [C34.90]  Anemia, unspecified type [D64.9]    Subjective:    Patient seen and examined. Reports nausea this am. States had 4 BMs yesterday and they were black. Now with diarrhea. She would like to speak with hospice about end of life care. We spoke about possible consult to GI for colonoscopy if FOBT was positive but she declined any intervention. Will cancel FOBT per her request. Sister at bedside    ROS:   Denies CP, SOB, subjective fever, chills, N/V/D, abdominal pain,  HA. All systems reviewed and negative unless otherwise documented.       albuterol  0.63 mg Nebulization 4x daily    guaiFENesin  400 mg Oral TID    ertapenem (INVanz) IVPB  1,000 mg IntraVENous Q24H    lactobacillus  1 capsule Oral Daily    benzonatate  100 mg Oral Daily    Vitamin D  5,000 Units Oral Daily    citalopram  20 mg Oral Daily    lactulose  30 mL Oral Daily    morphine  30 mg Oral BID    therapeutic multivitamin-minerals  1 tablet Oral Daily    pantoprazole  40 mg Oral QAM AC    sennosides-docusate sodium  2 tablet Oral BID    sodium chloride flush  5-40 mL IntraVENous 2 times per day    levothyroxine  25 mcg Oral Daily     sodium chloride, , PRN  ondansetron, 4 mg, Q8H PRN  oxyCODONE, 20 mg, Q4H PRN  sodium chloride flush, 5-40 mL, PRN  sodium chloride, , PRN  polyethylene glycol, 17 g, Daily PRN  acetaminophen, 650 mg, Q6H PRN   Or  acetaminophen, 650 mg, Q6H PRN  magnesium sulfate, 2,000 mg, PRN  potassium chloride, 40 mEq, PRN   Or  potassium alternative oral replacement, 40 mEq, PRN   Or  potassium chloride, 10 mEq, PRN         Objective:    /60   Pulse 74   Temp 97.8 °F (36.6 °C) (Oral)   Resp 18   Ht 5' 4\" (1.626 m) Wt 166 lb 1.6 oz (75.3 kg)   SpO2 91%   BMI 28.51 kg/m²   General Appearance: alert and oriented to person, place and time and in no acute distress  Skin: warm and dry  Head: normocephalic and atraumatic  Eyes: pupils equal, round, and reactive to light, extraocular eye movements intact, conjunctivae normal  Neck: neck supple and non tender without mass   Pulmonary/Chest: coarse crackles in pedro bases, normal air movement, no respiratory distress  Cardiovascular: normal rate, normal S1 and S2 and no carotid bruits  Abdomen: soft, non-tender, non-distended, normal bowel sounds, no masses or organomegaly  Extremities: no cyanosis, no clubbing and no edema  Neurologic: no cranial nerve deficit and speech normal      Recent Labs     07/04/22  1056 07/05/22  0036 07/06/22  0507    135 136   K 3.9 4.4 3.9   CL 96* 98 97*   CO2 27 26 25   BUN 10 10 10   CREATININE 0.5 0.5 0.5   GLUCOSE 105* 113* 100*   CALCIUM 8.9 8.9 9.0       Recent Labs     07/04/22  1056 07/05/22  0036 07/06/22  0507   ALKPHOS 67 64 73   PROT 6.4 6.2* 6.3*   LABALBU 2.6* 2.3* 2.5*   BILITOT 0.5 0.6 0.5   AST 21 23 39*   ALT 10 13 21       Recent Labs     07/04/22  1056 07/04/22  1825 07/05/22  0036 07/05/22  1556 07/06/22  0507   WBC 12.3*  --  13.3*  --  11.2   RBC 2.35*  --  2.80*  --  2.79*   HGB 6.2*   < > 7.5* 7.6* 7.4*   HCT 21.4*   < > 25.1* 25.0* 25.6*   MCV 91.1  --  89.6  --  91.8   MCH 26.4  --  26.8  --  26.5   MCHC 29.0*  --  29.9*  --  28.9*   RDW 18.8*  --  17.6*  --  17.9*   *  --  449  --  458*   MPV 9.3  --  9.2  --  9.8    < > = values in this interval not displayed. Radiology:   CTA PULMONARY W CONTRAST   Final Result   No central pulmonary embolism or aortic dissection. Extensive patchy and ground-glass infiltrates and pleural effusions   concerning for diffuse pneumonia and or edema. Multiple bilateral pulmonary masses and nodules as noted some of which are   necrotic concerning for malignancy. Superimposed complicated pneumonia is   also consideration. Please correlate with the pathology report from tissue   sampling. XR CHEST PORTABLE   Final Result   Worsening airspace consolidation on the right with confluence now present. Left lung mass with likely adjacent infiltrate and or atelectasis. XR CHEST PORTABLE    Result Date: 7/4/2022  EXAMINATION: ONE XRAY VIEW OF THE CHEST 7/4/2022 9:42 am COMPARISON: Chest CT scan 21 June 2022 HISTORY: ORDERING SYSTEM PROVIDED HISTORY: shortness of breath TECHNOLOGIST PROVIDED HISTORY: Reason for exam:->shortness of breath FINDINGS: Notably worsening airspace consolidation in the right lung which is now confluent. A left lower lobe lung mass is redemonstrated and the borders are rather ill-defined suggesting that there is adjacent infiltrate and or atelectasis. Worsening airspace consolidation on the right with confluence now present. Left lung mass with likely adjacent infiltrate and or atelectasis. CTA PULMONARY W CONTRAST    Result Date: 7/4/2022  EXAMINATION: CTA OF THE CHEST 7/4/2022 1:06 pm TECHNIQUE: CTA of the chest was performed after the administration of intravenous contrast.  Multiplanar reformatted images are provided for review. MIP images are provided for review. Automated exposure control, iterative reconstruction, and/or weight based adjustment of the mA/kV was utilized to reduce the radiation dose to as low as reasonably achievable. COMPARISON: 06/21/2022 HISTORY: ORDERING SYSTEM PROVIDED HISTORY: shortness of breath TECHNOLOGIST PROVIDED HISTORY: Reason for exam:->shortness of breath Decision Support Exception - unselect if not a suspected or confirmed emergency medical condition->Emergency Medical Condition (MA) FINDINGS: There is borderline cardiac size.   The great vessels are normal.  Moderately enlarged mediastinal and hilar lymph nodes are present with lymph nodes measuring up to 1.6 cm in the AP window, paratracheal region, subcarinal region and pulmonary hilum. There are no filling defects in the main pulmonary artery and the central branches. There is diffuse multifocal patchy and ground-glass infiltrates bilaterally with moderate pleural effusions. The previously noted bilateral pulmonary masses are identified with the larger 1 in the left lower lobe measuring 7.3 x 7.8 x 7 cm and larger 1 which is necrotic in the right upper lobe measuring 3.6 x 3.2 cm. Nodules are identified in the left upper lobe some of which are cavitary with cavitary nodule measuring nearly 2.2 x 1.3 cm with additional smaller nodules measuring 5-6 mm. Liver is decreased in attenuation likely fatty. Gallbladder is distended. Consider ultrasonography. Peripelvic cysts are identified in the left kidney. A hiatal hernia is noted. No central pulmonary embolism or aortic dissection. Extensive patchy and ground-glass infiltrates and pleural effusions concerning for diffuse pneumonia and or edema. Multiple bilateral pulmonary masses and nodules as noted some of which are necrotic concerning for malignancy. Superimposed complicated pneumonia is also consideration. Please correlate with the pathology report from tissue sampling. Assessment:  Principal Problem:    Acute respiratory insufficiency  Active Problems:    Thyroid disease    Lung cancer ([address]    Hyperlipidemia LDL goal <100  Resolved Problems:    * No resolved hospital problems. *      Plan:  1. Acute respiratory insufficiency  -O2 sats 88% on 2.5 Liters NC now on 7 lietrs NC  to keep sats >93%  -wean as able to keep sats >92%  -CTA with pleural effusions, PNA  -has been seen by pulmonary, input appreciated.      2. Acute anemia  -hgb 6.2 on admission  -s/p 1 unit PRBC in ER 07/4/22  -trend H&H q12 and transfuse for Hgb <7.0  -has been seen by oncology       3.  Stage IV Lung cancer  -recently diagnosed June 2022  -consult oncology- Dr. Zoltan Marie  -continue home ms contin  - S/p IR guided biopsy on 6/21  -lytic lesion of the right pubic symphysis seen on CT abdomen prior admission  -seen by palliative care, patient requests Hospice consult     4. Recurrent PNA  -seen by ID on side bar last admission for post obstructive PNA, not treated with abx.   -procal this admit 0.12  - given dose vanc/cefepime in ER-continue for now until seen by ID  -was seen by ID last admission, will consult due to CTA with Extensive patchy and ground-glass infiltrates and pleural effusions, concerning for diffuse pneumonia and or edema  -afebrile, leukocytosis improved     5.   Hypothyroid  -continue  Levothyroxine  -recent TSH 4.510     6. Opiate induced constipation  -continue  lactulose         Electronically signed by PORSHA Beauchamp - CNP on 7/6/2022 at 8:45 AM

## 2022-07-07 VITALS
HEIGHT: 64 IN | TEMPERATURE: 98.2 F | SYSTOLIC BLOOD PRESSURE: 121 MMHG | HEART RATE: 81 BPM | RESPIRATION RATE: 16 BRPM | OXYGEN SATURATION: 91 % | WEIGHT: 168 LBS | DIASTOLIC BLOOD PRESSURE: 64 MMHG | BODY MASS INDEX: 28.68 KG/M2

## 2022-07-07 LAB
ALBUMIN SERPL-MCNC: 2.3 G/DL (ref 3.5–5.2)
ALP BLD-CCNC: 69 U/L (ref 35–104)
ALT SERPL-CCNC: 31 U/L (ref 0–32)
ANION GAP SERPL CALCULATED.3IONS-SCNC: 9 MMOL/L (ref 7–16)
AST SERPL-CCNC: 48 U/L (ref 0–31)
BASOPHILS ABSOLUTE: 0.05 E9/L (ref 0–0.2)
BASOPHILS RELATIVE PERCENT: 0.4 % (ref 0–2)
BILIRUB SERPL-MCNC: 0.8 MG/DL (ref 0–1.2)
BUN BLDV-MCNC: 10 MG/DL (ref 6–23)
CALCIUM SERPL-MCNC: 8.8 MG/DL (ref 8.6–10.2)
CHLORIDE BLD-SCNC: 100 MMOL/L (ref 98–107)
CO2: 26 MMOL/L (ref 22–29)
CREAT SERPL-MCNC: 0.5 MG/DL (ref 0.5–1)
EOSINOPHILS ABSOLUTE: 0.21 E9/L (ref 0.05–0.5)
EOSINOPHILS RELATIVE PERCENT: 1.7 % (ref 0–6)
GFR AFRICAN AMERICAN: >60
GFR NON-AFRICAN AMERICAN: >60 ML/MIN/1.73
GLUCOSE BLD-MCNC: 104 MG/DL (ref 74–99)
HCT VFR BLD CALC: 26 % (ref 34–48)
HEMOGLOBIN: 8.1 G/DL (ref 11.5–15.5)
IMMATURE GRANULOCYTES #: 0.08 E9/L
IMMATURE GRANULOCYTES %: 0.7 % (ref 0–5)
L. PNEUMOPHILA SEROGP 1 UR AG: NORMAL
LYMPHOCYTES ABSOLUTE: 2.34 E9/L (ref 1.5–4)
LYMPHOCYTES RELATIVE PERCENT: 19.4 % (ref 20–42)
MCH RBC QN AUTO: 27.6 PG (ref 26–35)
MCHC RBC AUTO-ENTMCNC: 31.2 % (ref 32–34.5)
MCV RBC AUTO: 88.4 FL (ref 80–99.9)
MONOCYTES ABSOLUTE: 1.21 E9/L (ref 0.1–0.95)
MONOCYTES RELATIVE PERCENT: 10 % (ref 2–12)
NEUTROPHILS ABSOLUTE: 8.2 E9/L (ref 1.8–7.3)
NEUTROPHILS RELATIVE PERCENT: 67.8 % (ref 43–80)
PDW BLD-RTO: 17.1 FL (ref 11.5–15)
PLATELET # BLD: 413 E9/L (ref 130–450)
PMV BLD AUTO: 9.3 FL (ref 7–12)
POTASSIUM REFLEX MAGNESIUM: 4.1 MMOL/L (ref 3.5–5)
RBC # BLD: 2.94 E12/L (ref 3.5–5.5)
SODIUM BLD-SCNC: 135 MMOL/L (ref 132–146)
STREP PNEUMONIAE ANTIGEN, URINE: NORMAL
TOTAL PROTEIN: 6.1 G/DL (ref 6.4–8.3)
WBC # BLD: 12.1 E9/L (ref 4.5–11.5)

## 2022-07-07 PROCEDURE — 85025 COMPLETE CBC W/AUTO DIFF WBC: CPT

## 2022-07-07 PROCEDURE — 99232 SBSQ HOSP IP/OBS MODERATE 35: CPT | Performed by: NURSE PRACTITIONER

## 2022-07-07 PROCEDURE — 6370000000 HC RX 637 (ALT 250 FOR IP)

## 2022-07-07 PROCEDURE — 99239 HOSP IP/OBS DSCHRG MGMT >30: CPT | Performed by: STUDENT IN AN ORGANIZED HEALTH CARE EDUCATION/TRAINING PROGRAM

## 2022-07-07 PROCEDURE — 2580000003 HC RX 258: Performed by: NURSE PRACTITIONER

## 2022-07-07 PROCEDURE — C9113 INJ PANTOPRAZOLE SODIUM, VIA: HCPCS | Performed by: NURSE PRACTITIONER

## 2022-07-07 PROCEDURE — 2700000000 HC OXYGEN THERAPY PER DAY

## 2022-07-07 PROCEDURE — 94640 AIRWAY INHALATION TREATMENT: CPT

## 2022-07-07 PROCEDURE — A4216 STERILE WATER/SALINE, 10 ML: HCPCS | Performed by: NURSE PRACTITIONER

## 2022-07-07 PROCEDURE — 80053 COMPREHEN METABOLIC PANEL: CPT

## 2022-07-07 PROCEDURE — 6360000002 HC RX W HCPCS: Performed by: NURSE PRACTITIONER

## 2022-07-07 PROCEDURE — 36415 COLL VENOUS BLD VENIPUNCTURE: CPT

## 2022-07-07 PROCEDURE — 6370000000 HC RX 637 (ALT 250 FOR IP): Performed by: NURSE PRACTITIONER

## 2022-07-07 RX ORDER — MORPHINE SULFATE 100 MG/5ML
10 SOLUTION ORAL
Qty: 30 ML | Refills: 0 | Status: ON HOLD | OUTPATIENT
Start: 2022-07-07 | End: 2022-07-11

## 2022-07-07 RX ORDER — MORPHINE SULFATE 30 MG/1
30 TABLET, FILM COATED, EXTENDED RELEASE ORAL 2 TIMES DAILY
Qty: 14 TABLET | Refills: 0 | Status: ON HOLD | OUTPATIENT
Start: 2022-07-07 | End: 2022-07-11

## 2022-07-07 RX ORDER — MORPHINE SULFATE 2 MG/ML
2 INJECTION, SOLUTION INTRAMUSCULAR; INTRAVENOUS ONCE
Status: COMPLETED | OUTPATIENT
Start: 2022-07-07 | End: 2022-07-07

## 2022-07-07 RX ORDER — LORAZEPAM 1 MG/1
1 TABLET ORAL EVERY 8 HOURS PRN
Qty: 20 TABLET | Refills: 0 | Status: ON HOLD | OUTPATIENT
Start: 2022-07-07 | End: 2022-07-11 | Stop reason: DRUGHIGH

## 2022-07-07 RX ADMIN — GUAIFENESIN 400 MG: 400 TABLET ORAL at 09:27

## 2022-07-07 RX ADMIN — DOCUSATE SODIUM 50 MG AND SENNOSIDES 8.6 MG 2 TABLET: 8.6; 5 TABLET, FILM COATED ORAL at 09:27

## 2022-07-07 RX ADMIN — OXYCODONE 20 MG: 5 TABLET ORAL at 02:01

## 2022-07-07 RX ADMIN — MORPHINE SULFATE 2 MG: 2 INJECTION, SOLUTION INTRAMUSCULAR; INTRAVENOUS at 09:26

## 2022-07-07 RX ADMIN — ALBUTEROL SULFATE 0.63 MG: 0.63 SOLUTION RESPIRATORY (INHALATION) at 11:26

## 2022-07-07 RX ADMIN — SODIUM CHLORIDE, PRESERVATIVE FREE 5 ML: 5 INJECTION INTRAVENOUS at 09:33

## 2022-07-07 RX ADMIN — BENZONATATE 100 MG: 100 CAPSULE ORAL at 09:27

## 2022-07-07 RX ADMIN — OXYCODONE 20 MG: 5 TABLET ORAL at 11:42

## 2022-07-07 RX ADMIN — SODIUM CHLORIDE 40 MG: 9 INJECTION, SOLUTION INTRAMUSCULAR; INTRAVENOUS; SUBCUTANEOUS at 06:11

## 2022-07-07 RX ADMIN — PROCHLORPERAZINE MALEATE 5 MG: 5 TABLET ORAL at 07:35

## 2022-07-07 RX ADMIN — CITALOPRAM HYDROBROMIDE 20 MG: 20 TABLET ORAL at 09:27

## 2022-07-07 RX ADMIN — MORPHINE SULFATE 30 MG: 30 TABLET, FILM COATED, EXTENDED RELEASE ORAL at 07:35

## 2022-07-07 ASSESSMENT — PAIN SCALES - GENERAL
PAINLEVEL_OUTOF10: 6
PAINLEVEL_OUTOF10: 3
PAINLEVEL_OUTOF10: 9
PAINLEVEL_OUTOF10: 9
PAINLEVEL_OUTOF10: 8
PAINLEVEL_OUTOF10: 6
PAINLEVEL_OUTOF10: 9

## 2022-07-07 ASSESSMENT — PAIN DESCRIPTION - DESCRIPTORS
DESCRIPTORS: ACHING

## 2022-07-07 ASSESSMENT — PAIN DESCRIPTION - LOCATION
LOCATION: BACK

## 2022-07-07 ASSESSMENT — PAIN DESCRIPTION - PAIN TYPE: TYPE: CHRONIC PAIN

## 2022-07-07 ASSESSMENT — PAIN DESCRIPTION - ORIENTATION
ORIENTATION: MID
ORIENTATION: RIGHT;MID
ORIENTATION: RIGHT;MID;LEFT
ORIENTATION: MID

## 2022-07-07 ASSESSMENT — PAIN DESCRIPTION - ONSET: ONSET: ON-GOING

## 2022-07-07 ASSESSMENT — PAIN SCALES - WONG BAKER
WONGBAKER_NUMERICALRESPONSE: 0
WONGBAKER_NUMERICALRESPONSE: 0

## 2022-07-07 ASSESSMENT — PAIN DESCRIPTION - FREQUENCY: FREQUENCY: CONTINUOUS

## 2022-07-07 NOTE — CARE COORDINATION
Social Work:    Denise at [facility] is arranging plans for transfer to the [facility].      Electronically signed by CHAR Avendaño on 7/7/2022 at 12:07 PM

## 2022-07-07 NOTE — PROGRESS NOTES
Pulmonary Progress Note    Admit Date: 2022                            PCP: Ashish Aceves DO  Principal Problem:    Acute respiratory insufficiency  Active Problems:    Thyroid disease    Lung cancer ([address]    Hyperlipidemia LDL goal <100  Resolved Problems:    * No resolved hospital problems. *      Subjective:  Patient was seen resting in bed on 7L HFNC with her daughter at the bedside  She is still having hemoptysis but decided to proceed with hospice and wants to defer any further intervention    Medications:   sodium chloride      sodium chloride      sodium chloride          pantoprazole (PROTONIX) 40 mg injection  40 mg IntraVENous Daily    benzonatate  100 mg Oral BID    albuterol  0.63 mg Nebulization 4x daily    guaiFENesin  400 mg Oral TID    ertapenem (INVanz) IVPB  1,000 mg IntraVENous Q24H    lactobacillus  1 capsule Oral Daily    Vitamin D  5,000 Units Oral Daily    citalopram  20 mg Oral Daily    lactulose  30 mL Oral Daily    morphine  30 mg Oral BID    therapeutic multivitamin-minerals  1 tablet Oral Daily    sennosides-docusate sodium  2 tablet Oral BID    sodium chloride flush  5-40 mL IntraVENous 2 times per day    levothyroxine  25 mcg Oral Daily       Vitals:  VITALS:  /64   Pulse 82   Temp 98.2 °F (36.8 °C) (Oral)   Resp 16   Ht 5' 4\" (1.626 m)   Wt 168 lb (76.2 kg)   SpO2 92%   BMI 28.84 kg/m²   24HR INTAKE/OUTPUT:      Intake/Output Summary (Last 24 hours) at 2022 1009  Last data filed at 2022 0933  Gross per 24 hour   Intake 842.5 ml   Output 1100 ml   Net -257.5 ml     CURRENT PULSE OXIMETRY:  SpO2: 92 %  24HR PULSE OXIMETRY RANGE:  SpO2  Av.9 %  Min: 91 %  Max: 95 %  CVP:    VENT SETTINGS:      Additional Respiratory Assessments  Heart Rate: 82  Resp: 16  SpO2: 92 %      EXAM:  General: No distress. Alert. Eyes:  No sclera icterus. No conjunctival injection. ENT: No discharge. Pharynx clear. Neck: Trachea midline.  Normal thyroid. Resp: No accessory muscle use. No wheezing. Crackles bilateral lower lobes, rhonchi bilaterally  CV: Regular rate. Regular rhythm. No mumur or rub. ABD: Non-tender. Non-distended. . Normal bowel sounds. Skin: Warm and dry. Protruding lump on R middle back. No rash on exposed extremities. M/S: No cyanosis. No joint deformity. No clubbing. Neuro: Awake. Follows commands. I/O: I/O last 3 completed shifts: In: 957.5 [P.O.:290; I.V.:10; Blood:657.5]  Out: 1350 [Urine:1350]  I/O this shift: In: 5 [I.V.:5]  Out: -      Results:  CBC:   Recent Labs     07/05/22  0036 07/05/22  1556 07/06/22  0507 07/06/22  1905 07/07/22  0451   WBC 13.3*  --  11.2  --  12.1*   HGB 7.5*   < > 7.4* 6.9* 8.1*   HCT 25.1*   < > 25.6* 23.4* 26.0*   MCV 89.6  --  91.8  --  88.4     --  458*  --  413    < > = values in this interval not displayed. BMP:   Recent Labs     07/05/22  0036 07/06/22  0507 07/07/22  0451    136 135   K 4.4 3.9 4.1   CL 98 97* 100   CO2 26 25 26   BUN 10 10 10   CREATININE 0.5 0.5 0.5     LFT:   Recent Labs     07/04/22  1056 07/04/22  1056 07/05/22  0036 07/06/22  0507 07/07/22  0451   ALKPHOS 67   < > 64 73 69   ALT 10   < > 13 21 31   AST 21   < > 23 39* 48*   PROT 6.4   < > 6.2* 6.3* 6.1*   BILITOT 0.5   < > 0.6 0.5 0.8   BILIDIR <0.2  --   --   --   --    LABALBU 2.6*   < > 2.3* 2.5* 2.3*    < > = values in this interval not displayed. PT/INR: No results for input(s): PROTIME, INR in the last 72 hours. Cultures:  No results for input(s): CULTRESP in the last 72 hours. ABG:   No results for input(s): PH, PO2, PCO2, HCO3, BE, O2SAT in the last 72 hours.     Films:  XR CHEST PORTABLE    Result Date: 7/4/2022  EXAMINATION: ONE XRAY VIEW OF THE CHEST 7/4/2022 9:42 am COMPARISON: Chest CT scan 21 June 2022 HISTORY: ORDERING SYSTEM PROVIDED HISTORY: shortness of breath TECHNOLOGIST PROVIDED HISTORY: Reason for exam:->shortness of breath FINDINGS: Notably worsening airspace consolidation in the right lung which is now confluent. A left lower lobe lung mass is redemonstrated and the borders are rather ill-defined suggesting that there is adjacent infiltrate and or atelectasis. Worsening airspace consolidation on the right with confluence now present. Left lung mass with likely adjacent infiltrate and or atelectasis. 7/4 6/21       CTA PULMONARY W CONTRAST    Result Date: 7/4/2022  EXAMINATION: CTA OF THE CHEST 7/4/2022 1:06 pm TECHNIQUE: CTA of the chest was performed after the administration of intravenous contrast.  Multiplanar reformatted images are provided for review. MIP images are provided for review. Automated exposure control, iterative reconstruction, and/or weight based adjustment of the mA/kV was utilized to reduce the radiation dose to as low as reasonably achievable. COMPARISON: 06/21/2022 HISTORY: ORDERING SYSTEM PROVIDED HISTORY: shortness of breath TECHNOLOGIST PROVIDED HISTORY: Reason for exam:->shortness of breath Decision Support Exception - unselect if not a suspected or confirmed emergency medical condition->Emergency Medical Condition (MA) FINDINGS: There is borderline cardiac size. The great vessels are normal.  Moderately enlarged mediastinal and hilar lymph nodes are present with lymph nodes measuring up to 1.6 cm in the AP window, paratracheal region, subcarinal region and pulmonary hilum. There are no filling defects in the main pulmonary artery and the central branches. There is diffuse multifocal patchy and ground-glass infiltrates bilaterally with moderate pleural effusions. The previously noted bilateral pulmonary masses are identified with the larger 1 in the left lower lobe measuring 7.3 x 7.8 x 7 cm and larger 1 which is necrotic in the right upper lobe measuring 3.6 x 3.2 cm.  Nodules are identified in the left upper lobe some of which are cavitary with cavitary nodule measuring nearly 2.2 x 1.3 cm with additional smaller nodules measuring 5-6 mm. Liver is decreased in attenuation likely fatty. Gallbladder is distended. Consider ultrasonography. Peripelvic cysts are identified in the left kidney. A hiatal hernia is noted. No central pulmonary embolism or aortic dissection. Extensive patchy and ground-glass infiltrates and pleural effusions concerning for diffuse pneumonia and or edema. Multiple bilateral pulmonary masses and nodules as noted some of which are necrotic concerning for malignancy. Superimposed complicated pneumonia is also consideration. Please correlate with the pathology report from tissue sampling.         06/20/22 CT ABD/PELVIS: Airspace disease identified lower lung fields bilaterally.  There is stool seen scattered throughout the colon to suggest clinical presentation of constipation.   Stable abnormal appearance of the right pubic symphysis with lytic lesion identified concerning for malignancy or metastatic disease. Assessment:  · Acute respiratory failure  · Adenocarcinoma of the lung, path 06/21- probable metastatic  · Pneumonia - ?post obstructive  · Bilateral pleural effusions  · Hypothyroid   · HDL  · Arthritis      Plan:  · Continue O2, currently on 7L HFNC with SpO2 91% . Wean to keep pox >92%. Was recently d/c'd on 2L. · Known recent  lung ca - cannot differentiate between primary or metastatic at this time. Path on 06/21 with adeno. Additional path results pending. Pt with B pleural effusion >L  · Still with hemoptysis, patient refuses bronchoscopy at this time   · 7/6 Hgb 6.9, received 1 uPRBC repeat hgb 8.1  · Currently on Ertapenam per ID. PCT 0.12, WBC 13.3>11. 2. afebrile   · Respiratory panel negative. Legionella, strep pneumoniae pending, sputum culture pending. · Oncology ordered PDL-1 path which is pending   · Continue tessalon for cough, changed to BID   · Continue mucinex, albuterol and flutter valve to enhance expectoration   · DNR CCA.      I spoke with patient and daughter this morning, she has decided to proceed with hospice care at this time and is deferring any further interventions.      Electronically signed by PORSHA Carrasco CNP on 7/7/2022 at 10:09 AM

## 2022-07-07 NOTE — PROGRESS NOTES
Palliative Care Department  [phone]  Palliative Care Progress Note  Provider PORSHA Mccormick CNP     Parviz Tellez  [id number]  Hospital Day: 4  Date of Initial Consult: 7/5/2022  Referring Provider: Gregor Fothergill, APRN - CNP  Palliative Medicine was consulted for assistance with: Goals of Care    HPI:   Parviz Tellez is a 66 y.o. with a medical history of HLD, GERD, hypothyroidism, depression, newly diagnosed lung CA who was admitted on 7/4/2022 from home with a CHIEF COMPLAINT of hemoptysis and shortness of breath x4 to 5 days. Patient was recently discharged from the hospital on 6/24 on 2 L nasal cannula. During that admission, she was admitted for new lung mass/carcinoma and postobstructive pneumonia. Patient was seen by her oncologist last week and was told that she has stage IV lung CA however pathology has not been resulted yet. Imaging significant for pleural effusions and pneumonia. Hemoglobin 6.2 in ED. Palliative medicine consulted for goals of care, patient known to palliative medicine team.    ASSESSMENT/PLAN:     Pertinent Hospital Diagnoses      Acute respiratory failure   Pleural effusions   Pneumonia   Anemia   Stage IV lung CA      Palliative Care Encounter / Counseling Regarding Goals of Care  Please see detailed goals of care discussion as below   At this time, Parviz Tellez, Does have capacity for medical decision-making.   Capacity is time limited and situation/question specific   Outcome of goals of care meeting: Discharged to hospice house today   Code status DNR-CCA   Advanced Directives: no POA or living will in [facility]   Surrogate/Legal NOK:  Sofie Fischer, spouse, [phone], [phone]  o Jarek Thompson, child, [phone]      Neoplasm related pain:   -MS Contin extended release 30 mg twice daily   -Oxycodone immediate release 20 mg every 4 hours as needed: 3 doses in last 24 hours   -2 mg IV morphine x1    Opioid-induced constipation:   -Lactulose daily   -Senokot-S 2 tablets twice daily    Nausea and vomiting:   -Ondansetron 4 mg every 8 hours as needed: 1 dose in last 24 hours--discontinue   -Prochlorperazine 5 mg every 6 hours as needed: 2 doses in last 24 hours     Spiritual assessment: no spiritual distress identified  Bereavement and grief: to be determined  Referrals to: none today  SUBJECTIVE:     Current medical issues leading to Palliative Medicine involvement include   Active Hospital Problems    Diagnosis Date Noted    Acute respiratory insufficiency [R06.89] 07/04/2022     Priority: Medium    Lung cancer ([address] [C34.90] 07/04/2022     Priority: Medium    Thyroid disease [E07.9] 06/20/2022     Priority: Medium    Hyperlipidemia LDL goal <100 [E78.5] 02/20/2018       Details of Conversation: Chart reviewed and patient seen. Patient is in bed eating breakfast, daughter is at the bedside. Plan is for patient to discharge to hospice house today. Patient is complaining of increased pain across her chest and in her back. Plan to give a one-time dose of 2 mg IV morphine. Patient states nausea has been improved and that today eating breakfast food tastes \" like it should\". Patient is tearful, stating that she is worried to leave her family especially her . Emotional support provided to patient and daughter.     OBJECTIVE:   Prognosis: depends upon goals and unknown    Physical Exam:  /64   Pulse 82   Temp 98.2 °F (36.8 °C) (Oral)   Resp 16   Ht 5' 4\" (1.626 m)   Wt 168 lb (76.2 kg)   SpO2 92%   BMI 28.84 kg/m²   Constitutional:  Elderly, NAD, awake, alert  Eyes: no scleral icterus, normal lids, no discharge  ENMT:  Normocephalic, atraumatic, mucosa moist, EOMI  Neck:  trachea midline, no JVD  Lungs:  HFNC, CTA bilaterally, no audible rhonchi or wheezes noted, respirations unlabored, no retractions  Heart:  RRR, distant heart tones, no murmur, rub, or gallop noted during exam  Abd:  Soft, non tender, non distended, bowel sounds present  Ext:  Moving all extremities, no edema, pulses present  Skin:  Warm and dry  Neuro:   Alert, grossly nonfocal; following commands    Objective data reviewed: labs, images, records, medication use, vitals and chart    Discussed patient and the plan of care with the other IDT members: Palliative Medicine IDT Team    Time/Communication  Greater than 50% of time spent, total 25 minutes in counseling and coordination of care at the bedside regarding goals of care, symptom management, diagnosis and prognosis and see above. Thank you for allowing Palliative Medicine to participate in the care of Dorie Blackman.

## 2022-07-07 NOTE — DISCHARGE SUMMARY
[facility] Physician Discharge Summary       No follow-up provider specified. Activity level: As tolerated     Dispo: Hospice House     Condition on discharge: Stable     Patient ID:  Jimi Kumar  [id number]  1 y.o.  1950    Admit date: 7/4/2022    Discharge date and time:  7/7/2022  11:18 AM    Admission Diagnoses: Principal Problem:    Acute respiratory insufficiency  Active Problems:    Thyroid disease    Lung cancer ([facility] [address]    Hyperlipidemia LDL goal <100  Resolved Problems:    * No resolved hospital problems. *      Discharge Diagnoses: Principal Problem:    Acute respiratory insufficiency  Active Problems:    Thyroid disease    Lung cancer ([facility] [address]    Hyperlipidemia LDL goal <100  Resolved Problems:    * No resolved hospital problems. *      Consults:  IP CONSULT TO ONCOLOGY  IP CONSULT TO INFECTIOUS DISEASES  IP CONSULT TO SPIRITUAL SERVICES  IP CONSULT TO PULMONOLOGY  IP CONSULT TO PALLIATIVE CARE  IP CONSULT TO PHARMACY  IP CONSULT TO [facility] Course:   Patient Jimi Kumar is a 66 y.o. presented with Acute respiratory insufficiency [R06.89]  Acute respiratory failure with hypoxia ([facility] [address] [J96.01]  Malignant neoplasm of lung, unspecified laterality, unspecified part of lung (HCC) [C34.90]  Anemia, unspecified type [D64.9]  Patient was admitted and managed for Ac hypoxic resp failure with hemoptysis underlying stage 4 lung ca/ post obstructive recurrent pneumonia - baseline 2.5 L , increased to 7 L, CTA with pleural effusions, pna, consulted ID, oncology & pulm S/P 1 dose vanc/cefepime in ER, changed to Ertapenem per ID. Palliative care was consulted, patient and daughter decided to proceed with hospice care at this time and is deferring any further interventions, hospice care intiated.       Discharge Exam:  General Appearance: alert and oriented to person, place and time and in no acute distress  Skin: warm and dry  Head: normocephalic and atraumatic  Eyes: pupils equal, round, and reactive to light, extraocular eye movements intact, conjunctivae normal  Neck: neck supple and non tender without mass   Pulmonary/Chest: clear to auscultation bilaterally- no wheezes, rales or rhonchi, normal air movement, no respiratory distress  Cardiovascular: normal rate, normal S1 and S2 and no carotid bruits  Abdomen: soft, non-tender, non-distended, normal bowel sounds, no masses or organomegaly  Extremities: no cyanosis, no clubbing and no edema  Neurologic: no cranial nerve deficit and speech normal    I/O last 3 completed shifts: In: 957.5 [P.O.:290; I.V.:10; Blood:657.5]  Out: 1350 [Urine:1350]  I/O this shift: In: 5 [I.V.:5]  Out: -       LABS:  Recent Labs     07/05/22  0036 07/06/22  0507 07/07/22  0451    136 135   K 4.4 3.9 4.1   CL 98 97* 100   CO2 26 25 26   BUN 10 10 10   CREATININE 0.5 0.5 0.5   GLUCOSE 113* 100* 104*   CALCIUM 8.9 9.0 8.8       Recent Labs     07/05/22  0036 07/05/22  1556 07/06/22  0507 07/06/22  1905 07/07/22  0451   WBC 13.3*  --  11.2  --  12.1*   RBC 2.80*  --  2.79*  --  2.94*   HGB 7.5*   < > 7.4* 6.9* 8.1*   HCT 25.1*   < > 25.6* 23.4* 26.0*   MCV 89.6  --  91.8  --  88.4   MCH 26.8  --  26.5  --  27.6   MCHC 29.9*  --  28.9*  --  31.2*   RDW 17.6*  --  17.9*  --  17.1*     --  458*  --  413   MPV 9.2  --  9.8  --  9.3    < > = values in this interval not displayed. No results for input(s): POCGLU in the last 72 hours. Imaging:  XR CHEST PORTABLE    Result Date: 7/4/2022  EXAMINATION: ONE XRAY VIEW OF THE CHEST 7/4/2022 9:42 am COMPARISON: Chest CT scan 21 June 2022 HISTORY: ORDERING SYSTEM PROVIDED HISTORY: shortness of breath TECHNOLOGIST PROVIDED HISTORY: Reason for exam:->shortness of breath FINDINGS: Notably worsening airspace consolidation in the right lung which is now confluent. A left lower lobe lung mass is redemonstrated and the borders are rather ill-defined suggesting that there is adjacent infiltrate and or atelectasis. Worsening airspace consolidation on the right with confluence now present. Left lung mass with likely adjacent infiltrate and or atelectasis. CTA PULMONARY W CONTRAST    Result Date: 7/4/2022  EXAMINATION: CTA OF THE CHEST 7/4/2022 1:06 pm TECHNIQUE: CTA of the chest was performed after the administration of intravenous contrast.  Multiplanar reformatted images are provided for review. MIP images are provided for review. Automated exposure control, iterative reconstruction, and/or weight based adjustment of the mA/kV was utilized to reduce the radiation dose to as low as reasonably achievable. COMPARISON: 06/21/2022 HISTORY: ORDERING SYSTEM PROVIDED HISTORY: shortness of breath TECHNOLOGIST PROVIDED HISTORY: Reason for exam:->shortness of breath Decision Support Exception - unselect if not a suspected or confirmed emergency medical condition->Emergency Medical Condition (MA) FINDINGS: There is borderline cardiac size. The great vessels are normal.  Moderately enlarged mediastinal and hilar lymph nodes are present with lymph nodes measuring up to 1.6 cm in the AP window, paratracheal region, subcarinal region and pulmonary hilum. There are no filling defects in the main pulmonary artery and the central branches. There is diffuse multifocal patchy and ground-glass infiltrates bilaterally with moderate pleural effusions. The previously noted bilateral pulmonary masses are identified with the larger 1 in the left lower lobe measuring 7.3 x 7.8 x 7 cm and larger 1 which is necrotic in the right upper lobe measuring 3.6 x 3.2 cm. Nodules are identified in the left upper lobe some of which are cavitary with cavitary nodule measuring nearly 2.2 x 1.3 cm with additional smaller nodules measuring 5-6 mm. Liver is decreased in attenuation likely fatty. Gallbladder is distended. Consider ultrasonography. Peripelvic cysts are identified in the left kidney. A hiatal hernia is noted.      No central pulmonary embolism or aortic dissection. Extensive patchy and ground-glass infiltrates and pleural effusions concerning for diffuse pneumonia and or edema. Multiple bilateral pulmonary masses and nodules as noted some of which are necrotic concerning for malignancy. Superimposed complicated pneumonia is also consideration. Please correlate with the pathology report from tissue sampling. Patient Instructions:      Medication List      START taking these medications    LORazepam 1 MG tablet  Commonly known as: ATIVAN  Take 1 tablet by mouth every 8 hours as needed for Anxiety for up to 10 days. CHANGE how you take these medications    * morphine 30 MG extended release tablet  Commonly known as: MS Contin  Take 1 tablet by mouth 2 times daily for 7 days. What changed: Another medication with the same name was added. Make sure you understand how and when to take each. * morphine sulfate 20 MG/ML concentrated oral solution  Take 0.5 mLs by mouth every 2 hours as needed for Pain for up to 10 days. What changed: You were already taking a medication with the same name, and this prescription was added. Make sure you understand how and when to take each. * This list has 2 medication(s) that are the same as other medications prescribed for you. Read the directions carefully, and ask your doctor or other care provider to review them with you. CONTINUE taking these medications    benzonatate 100 MG capsule  Commonly known as: TESSALON     citalopram 20 MG tablet  Commonly known as: CeleXA  Takes 20 mg daily     lactulose 10 GM/15ML solution  Commonly known as: CHRONULAC     ondansetron 4 MG disintegrating tablet  Commonly known as: ZOFRAN-ODT  Take 1 tablet by mouth every 8 hours as needed for Nausea or Vomiting     oxyCODONE 20 MG immediate release tablet  Commonly known as: ROXICODONE  Take 1 tablet by mouth every 4 hours as needed for Pain for up to 30 days.      sennosides-docusate sodium 8.6-50 MG tablet  Commonly known as: SENOKOT-S  Take 2 tablets by mouth 2 times daily        STOP taking these medications    calcium carbonate 600 MG Tabs tablet     cyclobenzaprine 5 MG tablet  Commonly known as: FLEXERIL     fish oil 1000 MG Caps     levothyroxine 25 MCG tablet  Commonly known as: Synthroid     MULTIVITAMIN & MINERAL PO     omeprazole 20 MG delayed release capsule  Commonly known as: PRILOSEC     Vitamin D3 50 MCG (2000 UT) Caps           Where to Get Your Medications      These medications were sent to [facility], [facility] [phone]  [facility] [address] [facility] [address]    Phone: [phone]   · LORazepam 1 MG tablet  · morphine 30 MG extended release tablet  · morphine sulfate 20 MG/ML concentrated oral solution           Note that more than 30 minutes was spent in preparing discharge papers, discussing discharge with patient, medication review, etc.    Signed:  Electronically signed by Mary Wallis MD on 7/7/2022 at 11:18 AM

## 2022-07-07 NOTE — DISCHARGE INSTR - COC
Continuity of Care Form    Patient Name: Zuleyma Russell   :  1950  MRN:  [id number]    Admit date:  2022  Discharge date: 22    Code Status Order: DNR-CCA   Advance Directives:      Admitting Physician:  Gregg Garcia MD  PCP: Henry Crocker DO    Discharging Nurse: [facility]/Room#: 1699/5149-X  Discharging Unit Phone Number: ***    Emergency Contact:   Extended Emergency Contact Information  Primary Emergency Contact: Giancarlo Mc  Address: [address] [place] [address] Dr JOSHI, [address] [place] [address] [address]  Phone: [phone]  Mobile Phone: [phone]  Relation: Spouse  Secondary Emergency Contact: Jae Ahumadar [facility] [address]  Phone: [phone]  Mobile Phone: [phone]  Relation: Child    Past Surgical History:  Past Surgical History:   Procedure Laterality Date    COLONOSCOPY      CT NEEDLE BIOPSY LUNG PERCUTANEOUS  2022    CT NEEDLE BIOPSY LUNG PERCUTANEOUS 2022 [facility] CT    EYE SURGERY Bilateral     Cataracts    HYSTERECTOMY (CERVIX STATUS UNKNOWN)      2017    SHOULDER ARTHROSCOPY Right 2019    RIGHT SHOULDER ARTHROSCOPY ROTATOR CUFF REPAIR POSSIBLE BICEPS TENODESIS performed by Dagoberto Murguia MD at [address] ARTHROSCOPY Left 2020    LEFT SHOULDER ARTHROSCOPY, ROTATOR CUFF REPAIR BICEPS TENODESIS (ARTHREX, INTERSCALENE BLOCK) performed by Dagoberto Murguia MD at [address]      URETEROSCOPY Left 2015       Immunization History:   Immunization History   Administered Date(s) Administered    COVID-19, MODERNA BLUE border, Primary or Immunocompromised, (age 12y+), IM, 100 mcg/0.5mL 2021, 2021    Influenza, High Dose (Fluzone 65 yrs and older) 10/19/2015, 2016, 2017, 10/01/2018, 10/18/2019    Influenza, Quadv, adjuvanted, 65 yrs +, IM, PF (Fluad) 10/01/2020    Influenza, Triv, inactivated, subunit, adjuvanted, IM (Fluad 65 yrs and older) 10/01/2018 Pneumococcal Conjugate 13-valent (Gaogscj03) 08/22/2017    Pneumococcal Polysaccharide (Eisijsfkx68) 07/25/2014, 08/26/2021    Tdap (Boostrix, Adacel) 08/11/2021    Zoster Live (Zostavax) 07/20/2014    Zoster Recombinant (Shingrix) 07/25/2020, 10/01/2020       Active Problems:  Patient Active Problem List   Diagnosis Code    Chest pain R07.9    Hyperlipidemia LDL goal <100 E78.5    Acute cystitis N30.00    Chronic left shoulder pain M25.512, G89.29    Anemia due to gastrointestinal blood loss D50.0    Intractable pain R52    Right hip pain M25.551    Lung mass R91.8    Thyroid disease E07.9    Gastroesophageal reflux disease without esophagitis K21.9    Anxiety F41.9    Hiatal hernia K44.9    Palliative care encounter Z51.5    Goals of care, counseling/discussion Z71.89    DNR (do not resuscitate) discussion Z71.89    Acute respiratory failure with hypoxia ([facility] [address] J96.01    Acute respiratory insufficiency R06.89    Lung cancer (HCC) C34.90       Isolation/Infection:   Isolation            No Isolation          Patient Infection Status       Infection Onset Added Last Indicated Last Indicated By Review Planned Expiration Resolved Resolved By    None active    Resolved    COVID-19 (Rule Out) 07/05/22 07/05/22 07/05/22 Respiratory Panel, Molecular, with COVID-19 (Restricted: peds pts or suitable admitted adults) (Ordered)   07/05/22 Rule-Out Test Resulted    COVID-19 (Rule Out) 07/04/22 07/04/22 07/04/22 COVID-19, Rapid (Ordered)   07/04/22 Rule-Out Test Resulted    COVID-19 (Rule Out) 05/16/20 05/16/20 05/16/20 Covid-19 Ambulatory (Ordered)   05/18/20 Rule-Out Test Resulted            Nurse Assessment:  Last Vital Signs: /64   Pulse 82   Temp 98.2 °F (36.8 °C) (Oral)   Resp 16   Ht 5' 4\" (1.626 m)   Wt 168 lb (76.2 kg)   SpO2 92%   BMI 28.84 kg/m²     Last documented pain score (0-10 scale): Pain Level: 9  Last Weight:   Wt Readings from Last 1 Encounters:   07/07/22 168 lb (76.2 kg)     Mental Status: oriented and alert    IV Access:  - Peripheral IV - site  R Upper Arm, insertion date: 07/04/22    Nursing Mobility/ADLs:  Walking   Assisted  Transfer  Assisted  Bathing  Assisted  Dressing  Assisted  Toileting  Assisted  Feeding  Independent  Med Admin  Assisted  Med Delivery   whole    Wound Care Documentation and Therapy:  Incision 05/21/20 Axilla Left (Active)   Number of days: 123        Elimination:  Continence: Bowel: {YES / FO:37557}  Bladder: {YES / RI:68442}  Urinary Catheter: None   Colostomy/Ileostomy/Ileal Conduit: {YES / RJ:58097}       Date of Last BM: ***    Intake/Output Summary (Last 24 hours) at 7/7/2022 1101  Last data filed at 7/7/2022 0933  Gross per 24 hour   Intake 842.5 ml   Output 1100 ml   Net -257.5 ml     I/O last 3 completed shifts: In: 957.5 [P.O.:290; I.V.:10; Blood:657.5]  Out: 1350 [Urine:1350]    Safety Concerns:     None    Impairments/Disabilities:      None    Nutrition Therapy:  Current Nutrition Therapy:   - Oral Diet:  General    Routes of Feeding: Oral  Liquids: {Slp liquid thickness:09083}  Daily Fluid Restriction: no  Last Modified Barium Swallow with Video (Video Swallowing Test): not done    Treatments at the Time of Hospital Discharge:   Respiratory Treatments: ***  Oxygen Therapy:  is on oxygen at 7 L/min per nasal cannula.   Ventilator:    - No ventilator support    Rehab Therapies: {THERAPEUTIC INTERVENTION:6201412865}  Weight Bearing Status/Restrictions: No weight bearing restrictions  Other Medical Equipment (for information only, NOT a DME order):  {EQUIPMENT:946283964}  Other Treatments: ***    Patient's personal belongings (please select all that are sent with patient):  None    RN SIGNATURE:  Electronically signed by Juany Knight RN on 7/7/22 at 11:09 AM EDT    CASE MANAGEMENT/SOCIAL WORK SECTION    Inpatient Status Date: ***    Readmission Risk Assessment Score:  Readmission Risk              Risk of Unplanned Readmission:  21           Discharging to Facility/ Agency   Name:   Address:  Phone:  Fax:    Dialysis Facility (if applicable)   Name:  Address:  Dialysis Schedule:  Phone:  Fax:    / signature: {Esignature:[id number]}    PHYSICIAN SECTION    Prognosis: {Prognosis:0936070590}    Condition at Discharge: Lorrie Garcia Patient Condition:665306868}    Rehab Potential (if transferring to Rehab): {Prognosis:4493805076}    Recommended Labs or Other Treatments After Discharge: ***    Physician Certification: I certify the above information and transfer of Kathi Enriquez  is necessary for the continuing treatment of the diagnosis listed and that she requires {Admit to Appropriate Level of Care:99167} for {GREATER/LESS:764747104} 30 days.      Update Admission H&P: {CHP DME Changes in VUFAR:809280641}    PHYSICIAN SIGNATURE:  {Esignature:[id number]}

## 2022-07-07 NOTE — PROGRESS NOTES
HOSPICE [facility]    0730: Received a call from daughter Lane Cohen. They have decided to proceed with hospice and would like patient evaluated with inpatient level of care hospice at [facility]. 0915: Made visit to patients bedside. Khadijah Christian is a 66year old female who was recently diagnosed in June with Cancer. 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 Khadijah Christian has been seeing palliative care for pain management. She is taking ms contin 30mg BID and oxycodone 20mg Q4PRN. She has had some prn doses of IV morphine as well. She continues to have significant pain. She is also requiring RBC transfusions every few days. Khadijah Christian has decided not to pursue treatment and would like to proceed with hospice and comfort care. 1000: Spoke with FANNY Severino at [facility] who accepted patient for inpatient level of care hospice for pain management. Received bed 115 with a transport time of ASAP. [facility] able to transport at 1200. Gave nurse to nurse report to [facility]. Updated nursing, palliative care, and case management. Requested nurse leave IV and obtain discharge order.      Electronically signed by Tiffany Cunha RN on 7/7/2022 at 11:04 AM   [phone]

## 2022-07-09 LAB
BLOOD CULTURE, ROUTINE: NORMAL
CULTURE, BLOOD 2: NORMAL

## 2022-07-11 DIAGNOSIS — G89.3 NEOPLASM RELATED PAIN: ICD-10-CM

## 2022-07-11 DIAGNOSIS — Z51.5 HOSPICE CARE PATIENT: Primary | ICD-10-CM

## 2022-07-11 DIAGNOSIS — F41.9 ANXIETY: ICD-10-CM

## 2022-07-11 DIAGNOSIS — R09.89 AIR HUNGER: ICD-10-CM

## 2022-07-11 RX ORDER — HYDROMORPHONE HYDROCHLORIDE 2 MG/1
2 TABLET ORAL
Qty: 90 TABLET | Refills: 0 | Status: SHIPPED | OUTPATIENT
Start: 2022-07-11 | End: 2022-07-12 | Stop reason: SDUPTHER

## 2022-07-11 RX ORDER — LORAZEPAM 0.5 MG/1
0.5 TABLET ORAL EVERY 4 HOURS PRN
Qty: 30 TABLET | Refills: 2 | Status: SHIPPED | OUTPATIENT
Start: 2022-07-11 | End: 2022-08-10

## 2022-07-11 RX ORDER — METHADONE HYDROCHLORIDE 10 MG/1
TABLET ORAL
Qty: 75 TABLET | Refills: 0 | Status: SHIPPED | OUTPATIENT
Start: 2022-07-11 | End: 2022-08-10

## 2022-07-12 DIAGNOSIS — R09.89 AIR HUNGER: ICD-10-CM

## 2022-07-12 DIAGNOSIS — G89.3 NEOPLASM RELATED PAIN: ICD-10-CM

## 2022-07-12 DIAGNOSIS — Z51.5 HOSPICE CARE PATIENT: ICD-10-CM

## 2022-07-12 RX ORDER — HYDROMORPHONE HYDROCHLORIDE 2 MG/1
3 TABLET ORAL
Qty: 90 TABLET | Refills: 0 | Status: SHIPPED | OUTPATIENT
Start: 2022-07-12 | End: 2022-08-11

## 2022-08-24 LAB
Lab: NORMAL
REPORT: NORMAL
THIS TEST SENT TO: NORMAL

## 2023-09-07 NOTE — PROGRESS NOTES
Medicare Annual Wellness Visit  Name: Wilver Cornejo Date: 2021   MRN: [id number] Sex: Female   Age: 69 y.o. Ethnicity: Non- / Non    : 1950 Race: White (non-)      Esau Hobbs is here for Medicare AWV (yearly)    Screenings for behavioral, psychosocial and functional/safety risks, and cognitive dysfunction are all negative except as indicated below. These results, as well as other patient data from the [address] form, are documented in Flowsheets linked to this Encounter. Allergies   Allergen Reactions    Daypro [Oxaprozin] Itching    Demerol Hcl [Meperidine]     Levaquin [Levofloxacin In D5w]     Tape [Adhesive Tape] Itching     Ok to use paper tape per pt    Valium [Diazepam]        Prior to Visit Medications    Medication Sig Taking?  Authorizing Provider   diclofenac (VOLTAREN) 50 MG EC tablet TAKE ONE TABLET BY MOUTH THREE TIMES A DAY WITH MEALS Yes Tl Buchanan DO   Evolocumab (REPATHA) 140 MG/ML SOSY Inject 1 mL into the skin every 14 days Yes Tl Buchanan DO   citalopram (CELEXA) 20 MG tablet TAKE ONE TABLET BY MOUTH EVERY DAY Yes Tl Buchanan DO   Clotrimazole 1 % OINT Apply 1 Applicatorful topically 2 times daily Yes Tl Buchanan DO   Fexofenadine HCl (ALLEGRA ALLERGY PO) Take by mouth Yes Historical Provider, MD   pantoprazole (PROTONIX) 40 MG tablet Take 1 tablet by mouth every morning (before breakfast) Yes Tl Buchanan DO   sodium chloride (OCEAN, BABY AYR) 0.65 % nasal spray 1 spray by Nasal route as needed for Congestion Yes Historical Provider, MD   aspirin 81 MG tablet Take 81 mg by mouth daily Wayne/Dr Percy Talbert Yes Historical Provider, MD   Cholecalciferol (VITAMIN D3) 2000 UNITS CAPS Take 2,000 Units by mouth 2 times daily Yes Historical Provider, MD   Multiple Vitamins-Minerals (MULTIVITAMIN & MINERAL PO) Take 1 tablet by mouth daily Yes Historical Provider, MD   calcium carbonate 600 MG TABS tablet Take 1 tablet by mouth daily Yes Historical Provider, MD   Omega-3 Fatty Acids (FISH OIL) 1000 MG CAPS Take 2,000 mg by mouth 2 times daily  Yes Historical Provider, MD       Past Medical History:   Diagnosis Date    Arthritis     Hyperlipidemia     Right shoulder pain        Past Surgical History:   Procedure Laterality Date    COLONOSCOPY      EYE SURGERY Bilateral     Cataracts    HYSTERECTOMY      SHOULDER ARTHROSCOPY Right 9/5/2019    RIGHT SHOULDER ARTHROSCOPY ROTATOR CUFF REPAIR POSSIBLE BICEPS TENODESIS performed by David Barnes MD at [address] ARTHROSCOPY Left 5/21/2020    LEFT SHOULDER ARTHROSCOPY, ROTATOR CUFF REPAIR BICEPS TENODESIS (ARTHREX, INTERSCALENE BLOCK) performed by David Barnes MD at [address] [facility] URETEROSCOPY Left 09/14/2015       No family history on file. CareTeam (Including outside providers/suppliers regularly involved in providing care):   Patient Care Team:  Sameera Camarena DO as PCP - [facility] - [facility] Empaneled Provider    Wt Readings from Last 3 Encounters:   08/05/21 176 lb 8 oz (80.1 kg)   03/19/21 182 lb (82.6 kg)   02/03/21 179 lb (81.2 kg)     Vitals:    08/05/21 0902   BP: 130/80   Pulse: 67   Temp: 97.2 °F (36.2 °C)   SpO2: 96%   Weight: 176 lb 8 oz (80.1 kg)   Height: 5' 5\" (1.651 m)     Body mass index is 29.37 kg/m². Based upon direct observation of the patient, evaluation of cognition reveals recent and remote memory intact.     General Appearance: alert and oriented to person, place and time, well developed and well- nourished, in no acute distress  Skin: warm and dry, no rash or erythema  Head: normocephalic and atraumatic  Eyes: pupils equal, round, and reactive to light, extraocular eye movements intact, conjunctivae normal  ENT: tympanic membrane, external ear and ear canal normal bilaterally, nose without deformity, nasal mucosa and turbinates normal without polyps  Neck: supple and non-tender without mass, no thyromegaly or thyroid nodules, no cervical lymphadenopathy  Pulmonary/Chest: clear to auscultation bilaterally- no wheezes, rales or rhonchi, normal air movement, no respiratory distress  Cardiovascular: normal rate, regular rhythm, normal S1 and S2, no murmurs, rubs, clicks, or gallops, distal pulses intact, no carotid bruits  Abdomen: soft, non-tender, non-distended, normal bowel sounds, no masses or organomegaly  Extremities: no cyanosis, clubbing or edema  Musculoskeletal: normal range of motion, no joint swelling, deformity or tenderness  Neurologic: reflexes normal and symmetric, no cranial nerve deficit, gait, coordination and speech normal    Patient's complete Health Risk Assessment and screening values have been reviewed and are found in Flowsheets. The following problems were reviewed today and where indicated follow up appointments were made and/or referrals ordered. Positive Risk Factor Screenings with Interventions:               No Positive Risk Factors identified today.     Personalized Preventive Plan   Current Health Maintenance Status  Immunization History   Administered Date(s) Administered    Influenza, High Dose (Fluzone 65 yrs and older) 10/19/2015, 09/27/2016, 09/13/2017, 10/01/2018, 10/18/2019    Influenza, Triv, inactivated, subunit, adjuvanted, IM (Fluad 65 yrs and older) 10/01/2018    Pneumococcal Conjugate 13-valent (Zeatdol28) 08/22/2017    Pneumococcal Polysaccharide (Nnwwfliuc39) 07/25/2014    Zoster Live (Zostavax) 07/20/2014    Zoster Recombinant (Shingrix) 07/25/2020        Health Maintenance   Topic Date Due    Hepatitis C screen  Never done    DTaP/Tdap/Td vaccine (1 - Tdap) Never done    Annual Wellness Visit (AWV)  Never done    Pneumococcal 65+ years Vaccine (2 of 2 - PPSV23) 07/25/2019    Flu vaccine (1) 09/01/2021    Breast cancer screen  11/12/2022    Lipid screen  02/03/2026    Colon cancer screen colonoscopy  03/02/2030    DEXA (modify frequency per FRAX score)  Completed    Shingles Vaccine  Completed    COVID-19 Vaccine  Completed    Hepatitis A vaccine  Aged Out    Hepatitis B vaccine  Aged Out    Hib vaccine  Aged Out    Meningococcal (ACWY) vaccine  Aged Out     Recommendations for [employer] Due: see orders and patient instructions/AVS.  . Recommended screening schedule for the next 5-10 years is provided to the patient in written form: see Patient Marita Diaz was seen today for medicare awv. Diagnoses and all orders for this visit:    Hand arthritis  -     diclofenac (VOLTAREN) 50 MG EC tablet; TAKE ONE TABLET BY MOUTH THREE TIMES A DAY WITH MEALS    Hiatal hernia    Anxiety  -     citalopram (CELEXA) 20 MG tablet; TAKE ONE TABLET BY MOUTH EVERY DAY    Neck pain  -     citalopram (CELEXA) 20 MG tablet; TAKE ONE TABLET BY MOUTH EVERY DAY    Other orders  -     Evolocumab (REPATHA) 140 MG/ML SOSY; Inject 1 mL into the skin every 14 days           At last appointment she was treated with clotrimazole for angular cheilitis. We discussed the possibility of initiating Neurontin if her symptoms persist. Her angular chelitis is improving with sensodyne toothpaste. She did have a dental abscess of a back molar. She has been using a brace on her hand for her hand OA. She still does have pain. She follows with Dr. Ebenezer Ren for this issue. She has been having some thoracic back pain.      Thoracic back pain    Likely secondary to lipoma - will refer to general surgery    Will give pneumoccal vaccine today    Will give prescription for Tdap    Will do routine blood work today    Doing well since initiating repatha    Following with Dr. Ebenezer Ren for her shoulder pain and hand pain no

## (undated) DEVICE — CANNULA ARTHSCP L7CM DIA7MM TRNSLUC THRD FLX W/ NO SQUIRT

## (undated) DEVICE — GAUZE,SPONGE,4"X4",8PLY,STRL,LF,10/TRAY: Brand: MEDLINE

## (undated) DEVICE — IMMOBILIZER SHLDR L10.5-17IN D7IN SLNG W/ 15DEG ABD PLLW

## (undated) DEVICE — TUBING, SUCTION, 9/32" X 10', STRAIGHT: Brand: MEDLINE

## (undated) DEVICE — TAPE ADH W3INXL10YD WHT COT WVN BK POWERFUL RUB BASE HIGHLY

## (undated) DEVICE — PACK PROCEDURE SURG GEN CUST

## (undated) DEVICE — DRAPE,REIN 53X77,STERILE: Brand: MEDLINE

## (undated) DEVICE — CANNULA ARTHSCP L4CM DIA8MM PASSPRT BTTN

## (undated) DEVICE — SLEEVE TRAC SPANDEX LAT W/ 4IN COBAN SUPERFICIAL RAD NRV PD

## (undated) DEVICE — TUBING, SUCTION, 3/16" X 12', STRAIGHT: Brand: MEDLINE

## (undated) DEVICE — COVER DSG W7 7 8XL11IN WHT POR CLTH PRECUT WTRPRF MEDIPORE

## (undated) DEVICE — SOLUTION IV IRRIG LACTATED RINGERS 3000ML 2B7487

## (undated) DEVICE — NEEDLE SPNL L3.5IN PNK HUB S STL REG WALL FIT STYL W/ QNCKE

## (undated) DEVICE — ALCOHOL RUBBING ISO 16OZ 70%

## (undated) DEVICE — TUBING PMP L16FT MAIN DISP FOR AR-6400 AR-6475

## (undated) DEVICE — SUPER TURBOVAC 90 INTEGRATED CABLE WAND ICW: Brand: COBLATION

## (undated) DEVICE — DRAPE,U/ SHT,SPLIT,PLAS,STERIL: Brand: MEDLINE

## (undated) DEVICE — [TOMCAT CUTTER, ARTHROSCOPIC SHAVER BLADE,  DO NOT RESTERILIZE,  DO NOT USE IF PACKAGE IS DAMAGED,  KEEP DRY,  KEEP AWAY FROM SUNLIGHT]: Brand: FORMULA

## (undated) DEVICE — DOUBLE BASIN SET: Brand: MEDLINE INDUSTRIES, INC.

## (undated) DEVICE — PACK,SHOULDER SPLIT: Brand: MEDLINE

## (undated) DEVICE — 1000 S-DRAPE TOWEL DRAPE 10/BX: Brand: STERI-DRAPE™

## (undated) DEVICE — 3M™ STERI-DRAPE™ U-DRAPE 1015: Brand: STERI-DRAPE™

## (undated) DEVICE — PAD,ABDOMINAL,5"X9",ST,LF,25/BX: Brand: MEDLINE INDUSTRIES, INC.

## (undated) DEVICE — 3M™ COBAN™ NL STERILE NON-LATEX SELF-ADHERENT WRAP, 2084S, 4 IN X 5 YD (10 CM X 4,5 M), 18 ROLLS/CASE: Brand: 3M™ COBAN™

## (undated) DEVICE — GOWN,SIRUS,POLYRNF,BRTHSLV,XLN/XL,20/CS: Brand: MEDLINE

## (undated) DEVICE — KIT SURG W7XL11IN 2 PKT UNTREATED NA

## (undated) DEVICE — SYRINGE MED 50ML LUERLOCK TIP

## (undated) DEVICE — DRESSING,GAUZE,XEROFORM,CURAD,1"X8",ST: Brand: CURAD

## (undated) DEVICE — INTENDED FOR TISSUE SEPARATION, AND OTHER PROCEDURES THAT REQUIRE A SHARP SURGICAL BLADE TO PUNCTURE OR CUT.: Brand: BARD-PARKER ® STAINLESS STEEL BLADES

## (undated) DEVICE — DRAPE,SHOULDER,ORTHOMAX,W/POUCH,5/CS: Brand: MEDLINE

## (undated) DEVICE — GOWN,SIRUS,FABRNF,L,20/CS: Brand: MEDLINE

## (undated) DEVICE — 3M™ IOBAN™ 2 ANTIMICROBIAL INCISE DRAPE 6640EZ: Brand: IOBAN™ 2

## (undated) DEVICE — SYRINGE, LUER LOCK, 10ML: Brand: MEDLINE

## (undated) DEVICE — SUTURE SUTTAPE FIBERLINK 1.3MM WHT BLU CLS LOOP AR7535

## (undated) DEVICE — 4-PORT MANIFOLD: Brand: NEPTUNE 2

## (undated) DEVICE — APPLICATOR MEDICATED 26 CC SOLUTION HI LT ORNG CHLORAPREP

## (undated) DEVICE — CHLORAPREP 26ML ORANGE

## (undated) DEVICE — Device

## (undated) DEVICE — COVER HNDL LT DISP

## (undated) DEVICE — GLOVE SURG SZ 8 CRM LTX FREE POLYISOPRENE POLYMER BEAD ANTI

## (undated) DEVICE — BLADE CLIPPER GEN PURP NS

## (undated) DEVICE — STRIP,CLOSURE,WOUND,MEDI-STRIP,1/2X4: Brand: MEDLINE

## (undated) DEVICE — SYRINGE MED 30ML STD CLR PLAS LUERLOCK TIP N CTRL DISP

## (undated) DEVICE — MARKER,SKIN,WI/RULER AND LABELS: Brand: MEDLINE

## (undated) DEVICE — NEEDLE FLTR 18GA L1.5IN MEM THK5UM BLNT DISP

## (undated) DEVICE — STANDARD HYPODERMIC NEEDLE,POLYPROPYLENE HUB: Brand: MONOJECT

## (undated) DEVICE — SHOECOVER ANTI-SKID: Brand: CARDINAL HEALTH

## (undated) DEVICE — TOWEL,OR,DSP,ST,BLUE,STD,6/PK,12PK/CS: Brand: MEDLINE

## (undated) DEVICE — NEEDLE HYPO 18GA L1.5IN PNK POLYPR HUB S STL REG BVL STR

## (undated) DEVICE — NEEDLE SUT PASS FOR ROT CUF LABRAL REP MULTFI SCORPION